# Patient Record
Sex: FEMALE | Race: WHITE | HISPANIC OR LATINO | Employment: FULL TIME | URBAN - METROPOLITAN AREA
[De-identification: names, ages, dates, MRNs, and addresses within clinical notes are randomized per-mention and may not be internally consistent; named-entity substitution may affect disease eponyms.]

---

## 2017-01-06 RX ORDER — SPIRONOLACTONE 25 MG/1
25 TABLET ORAL DAILY
Qty: 30 TAB | Refills: 3
Start: 2017-01-06 | End: 2017-10-17

## 2017-03-01 RX ORDER — COLESEVELAM HYDROCHLORIDE 625 MG/1
TABLET, FILM COATED ORAL
Refills: 3 | Status: CANCELLED | OUTPATIENT
Start: 2017-03-01

## 2017-03-02 DIAGNOSIS — E78.2 MIXED HYPERLIPIDEMIA: ICD-10-CM

## 2017-03-02 RX ORDER — COLESEVELAM 180 1/1
TABLET ORAL
Qty: 540 TAB | Refills: 3 | Status: SHIPPED | OUTPATIENT
Start: 2017-03-02 | End: 2018-04-19 | Stop reason: SDUPTHER

## 2017-03-02 NOTE — TELEPHONE ENCOUNTER
Was the patient seen in the last year in this department? Yes 12/19/16    Does patient have an active prescription for medications requested? No     Received Request Via: Pharmacy

## 2017-06-14 ENCOUNTER — OFFICE VISIT (OUTPATIENT)
Dept: MEDICAL GROUP | Facility: PHYSICIAN GROUP | Age: 47
End: 2017-06-14
Payer: COMMERCIAL

## 2017-06-14 VITALS
HEIGHT: 65 IN | OXYGEN SATURATION: 100 % | DIASTOLIC BLOOD PRESSURE: 68 MMHG | WEIGHT: 172 LBS | RESPIRATION RATE: 12 BRPM | BODY MASS INDEX: 28.66 KG/M2 | TEMPERATURE: 98.2 F | HEART RATE: 65 BPM | SYSTOLIC BLOOD PRESSURE: 100 MMHG

## 2017-06-14 DIAGNOSIS — Z13.0 SCREENING FOR ENDOCRINE, METABOLIC AND IMMUNITY DISORDER: ICD-10-CM

## 2017-06-14 DIAGNOSIS — F32.A ANXIETY AND DEPRESSION: ICD-10-CM

## 2017-06-14 DIAGNOSIS — Z13.29 SCREENING FOR ENDOCRINE, METABOLIC AND IMMUNITY DISORDER: ICD-10-CM

## 2017-06-14 DIAGNOSIS — E78.2 MIXED HYPERLIPIDEMIA: ICD-10-CM

## 2017-06-14 DIAGNOSIS — Z13.1 SCREENING FOR DIABETES MELLITUS (DM): ICD-10-CM

## 2017-06-14 DIAGNOSIS — Z13.228 SCREENING FOR ENDOCRINE, METABOLIC AND IMMUNITY DISORDER: ICD-10-CM

## 2017-06-14 DIAGNOSIS — M79.602 LEFT ARM PAIN: ICD-10-CM

## 2017-06-14 DIAGNOSIS — F41.9 ANXIETY AND DEPRESSION: ICD-10-CM

## 2017-06-14 PROCEDURE — 99214 OFFICE O/P EST MOD 30 MIN: CPT | Performed by: INTERNAL MEDICINE

## 2017-06-14 NOTE — MR AVS SNAPSHOT
"        Any Palmashawn   2017 2:40 PM   Office Visit   MRN: [id number]    Department:  [facility]   Dept Phone:  [phone]    Description:  Female : 1970   Provider:  Cali Farris M.D.           Allergies as of 2017     Allergen Noted Reactions    Minocycline 2014   Hives    Lip swelling      You were diagnosed with     Left arm pain   [600001]       Screening for endocrine, metabolic and immunity disorder   [9081027]       Screening for diabetes mellitus (DM)   [870647]       Anxiety and depression   [940648]       Mixed hyperlipidemia   [272.2.ICD-9-CM]         Vital Signs     Blood Pressure Pulse Temperature Respirations Height Weight    100/68 mmHg 65 36.8 °C (98.2 °F) 12 1.651 m (5' 5\") 78.019 kg (172 lb)    Body Mass Index Oxygen Saturation Smoking Status             28.62 kg/m2 100% Never Smoker          Basic Information     Date Of Birth Sex Race Ethnicity Preferred Language    1970 Female White Non- English      Your appointments     Oct 17, 2017  7:40 AM   Established Patient with Nikki Mclean D.O.   [facility]    [address] [place] [address] [id number] [phone]           You will be receiving a confirmation call a few days before your appointment from our automated call confirmation system.              Problem List              ICD-10-CM Priority Class Noted - Resolved    Anxiety and depression F41.9, F32.9   10/15/2015 - Present    Low serum vitamin D R79.89   10/20/2015 - Present    Weight gain R63.5   10/20/2015 - Present    Hormone replacement therapy (HRT) Z79.890   10/20/2015 - Present    Mixed hyperlipidemia E78.2   10/23/2015 - Present    Arthralgia M25.50   6/10/2016 - Present    Left arm pain M79.602   2017 - Present      Health Maintenance        Date Due Completion Dates    IMM DTaP/Tdap/Td Vaccine (1 - Tdap) 1987 ---    MAMMOGRAM 2017 (Originally 10/20/2016) 10/20/2015 (Postponed), 2012, " 8/17/2011, 8/11/2009, 8/11/2009    Override on 10/20/2015: Postponed    PAP SMEAR 10/20/2018 10/20/2015 (Postponed)    Override on 10/20/2015: Postponed            Current Immunizations     Influenza TIV (IM) 10/24/2013    Influenza Vaccine Quad Inj (Preserved) 10/20/2015 12:08 PM      Below and/or attached are the medications your provider expects you to take. Review all of your home medications and newly ordered medications with your provider and/or pharmacist. Follow medication instructions as directed by your provider and/or pharmacist. Please keep your medication list with you and share with your provider. Update the information when medications are discontinued, doses are changed, or new medications (including over-the-counter products) are added; and carry medication information at all times in the event of emergency situations     Allergies:  MINOCYCLINE - Hives               Medications  Valid as of: June 14, 2017 -  4:19 PM    Generic Name Brand Name Tablet Size Instructions for use    Citalopram Hydrobromide (Tab) CELEXA 20 MG TAKE ONE AND ONE-HALF (1 & 1/2) TABLET BY MOUTH DAILY        Colesevelam HCl (Pack) Colesevelam HCl 3.75 G Take  by mouth every morning.        Colesevelam HCl (Pack) Colesevelam HCl 3.75 G Take  by mouth.        Colesevelam HCl (Tab) WELCHOL 625 MG TAKE THREE TABLETS BY MOUTH TWICE A DAY WITH FOOD        Ergocalciferol (Cap) DRISDOL 01734 UNITS TAKE ONE CAPSULE BY MOUTH EVERY 7 DAYS        Estradiol (Tab) ESTRACE 2 MG Take 2 mg by mouth every day.        Estrogens Conjugated   Take  by mouth.        Spironolactone (Tab) ALDACTONE 25 MG Take 1 Tab by mouth every day.        .                 Medicines prescribed today were sent to:     [facility] #[id number] - [place], [place] - [facility] AT [address] [address]    Phone: [phone] Fax: [phone]    Open 24 Hours?: No      Medication refill instructions:       If your prescription bottle indicates you  have medication refills left, it is not necessary to call your provider’s office. Please contact your pharmacy and they will refill your medication.    If your prescription bottle indicates you do not have any refills left, you may request refills at any time through one of the following ways: The online [employer] system (except Urgent Care), by calling your provider’s office, or by asking your pharmacy to contact your provider’s office with a refill request. Medication refills are processed only during regular business hours and may not be available until the next business day. Your provider may request additional information or to have a follow-up visit with you prior to refilling your medication.   *Please Note: Medication refills are assigned a new Rx number when refilled electronically. Your pharmacy may indicate that no refills were authorized even though a new prescription for the same medication is available at the pharmacy. Please request the medicine by name with the pharmacy before contacting your provider for a refill.        Your To Do List     Future Labs/Procedures Complete By Expires    ANTI-NUCLEAR ANTIBODY SERUM  As directed 6/14/2018    CARDIAC STRESS TEST TREADMILL ONLY  As directed 6/14/2018    CBC WITH DIFFERENTIAL  As directed 6/14/2018    COMP METABOLIC PANEL  As directed 6/14/2018    DX-ELBOW-COMPLETE 3+ LEFT  As directed 6/14/2018    DX-SHOULDER 2+ LEFT  As directed 6/14/2018    LIPID PROFILE  As directed 6/14/2018    TSH WITH REFLEX TO FT4  As directed 6/14/2018    VITAMIN D,25 HYDROXY  As directed 6/14/2018    WESTERGREN SED RATE  As directed 6/14/2018         Comput[employer] Access Code: Activation code not generated  Current Compute[employer] Status: Active

## 2017-06-14 NOTE — ASSESSMENT & PLAN NOTE
Pt reports a pain in her left elbow that has been present for about 5-6 months. She feels that it is worse at the end of the day and when she lays on the area. It is not related to exertion. She denies trauma to the area. She has chronic joint pain but feels that this area has been getting worse. The pain is mostly located in the elbow but it can radiate to the shoulder and upper chest. She has not noticed any improvement with rest. She uses tylenol and aleve with some improvement in symptoms. Before 5-6 months ago she had some pain in the joints but not in the chest area.     She has no smoking history. She reports that her grandfather had an MI when he was in his 50's. She does get night sweats fairly often but attributes this to post menopausal symptoms from previous hysterectomy. She reports joint stiffness but it lasts for less than an hour.

## 2017-06-14 NOTE — ASSESSMENT & PLAN NOTE
Pt is on celexa 20 mg daily for this. She reports good compliance with medication. She feels that her mood is stable and her anxiety is fairly well controlled on the medication. She denies suicidal or homicidal ideation.    Received message requesting hospital follow-up appointment. Hospital follow-up noted on 4-20-17.

## 2017-06-14 NOTE — PROGRESS NOTES
Subjective:   Any Schwarz is a 46 y.o. female here today for left arm pain, anxiety/depression, hyperlipidemia    Left arm pain  Pt reports a pain in her left elbow that has been present for about 5-6 months. She feels that it is worse at the end of the day and when she lays on the area. It is not related to exertion. She denies trauma to the area. She has chronic joint pain but feels that this area has been getting worse. The pain is mostly located in the elbow but it can radiate to the shoulder and upper chest. She has not noticed any improvement with rest. She uses tylenol and aleve with some improvement in symptoms. Before 5-6 months ago she had some pain in the joints but not in the chest area.     She has no smoking history. She reports that her grandfather had an MI when he was in his 50's. She does get night sweats fairly often but attributes this to post menopausal symptoms from previous hysterectomy. She reports joint stiffness but it lasts for less than an hour.     Anxiety and depression  Pt is on celexa 20 mg daily for this. She reports good compliance with medication. She feels that her mood is stable and her anxiety is fairly well controlled on the medication. She denies suicidal or homicidal ideation.     Mixed hyperlipidemia  Pt is on colesevelam with good control of cholesterol. She did not tolerate statins.        Current medicines (including changes today)  Current Outpatient Prescriptions   Medication Sig Dispense Refill   • colesevelam (WELCHOL) 625 MG Tab TAKE THREE TABLETS BY MOUTH TWICE A DAY WITH FOOD 540 Tab 3   • spironolactone (ALDACTONE) 25 MG Tab Take 1 Tab by mouth every day. 30 Tab 3   • vitamin D, Ergocalciferol, (DRISDOL) 60348 UNITS Cap capsule TAKE ONE CAPSULE BY MOUTH EVERY 7 DAYS 4 Cap 11   • citalopram (CELEXA) 20 MG Tab TAKE ONE AND ONE-HALF (1 & 1/2) TABLET BY MOUTH DAILY 45 Tab 11   • estradiol (ESTRACE) 2 MG Tab Take 2 mg by mouth every day.     • Colesevelam HCl  "(WELCHOL) 3.75 G Pack Take  by mouth.     • ESTROGENS CONJUGATED PO Take  by mouth.     • Colesevelam HCl (WELCHOL) 3.75 G PACK Take  by mouth every morning.       No current facility-administered medications for this visit.     She  has a past medical history of Anesthesia; Anxiety; Depression; Arthritis; Unspecified urinary incontinence; Other specified symptom associated with female genital organs; Cancer (CMS-HCC) (1990); Heart burn; Other specified disorder of intestines; and High cholesterol. She also has no past medical history of Breast cancer (CMS-HCC).    ROS   No shortness of breath, no palpitations     Objective:     Blood pressure 100/68, pulse 65, temperature 36.8 °C (98.2 °F), resp. rate 12, height 1.651 m (5' 5\"), weight 78.019 kg (172 lb), SpO2 100 %. Body mass index is 28.62 kg/(m^2).   Physical Exam:  Constitutional: Alert & oriented, no acute distress  Eye: Conjunctiva clear, lids normal, no discharge  ENMT: Lips without lesions, normal external nose and ears  Respiratory: Unlabored respiratory effort, lungs clear to auscultation, no wheezes, no ronchi  Cardiovascular: Normal S1, S2, no murmur, no edema  MSK: Mild TTP of posterior left shoulder, limited ROM of L shoulder due to pain. Normal elbow ROM, normal strength and sensation in upper extremities, normal radial pulse  Skin: Warm, dry, good turgor, no rashes in visible areas  Neuro: No overt focal neurologic deficits, normal gait  Psych: Normal mood and affect      Assessment and Plan:   The following treatment plan was discussed    1. Left arm pain  Order further workup with x-ray of the shoulder and elbow, and lab tests including ESR and PAZ however given patient's family history of cardiac disease will also order stress test to ensure no underlying cardiac pathology that is causing this  - CARDIAC STRESS TEST TREADMILL ONLY; Future  - CBC WITH DIFFERENTIAL; Future  - WESTERGREN SED RATE; Future  - ANTI-NUCLEAR ANTIBODY SERUM; Future    2. " Screening for endocrine, metabolic and immunity disorder  - VITAMIN D,25 HYDROXY; Future  - TSH WITH REFLEX TO FT4; Future    3. Screening for diabetes mellitus (DM)  - COMP METABOLIC PANEL; Future    4. Anxiety and depression  Doing well on citalopram. Continue current medication    5. Mixed hyperlipidemia  Continue WelChol  - LIPID PROFILE; Future      Followup: Return in about 4 months (around 10/14/2017) for with PCP Dr. Mclean.    Please note that this dictation was created using voice recognition software. I have made every reasonable attempt to correct obvious errors, but I expect that there are errors of grammar and possibly content that I did not discover before finalizing the note.

## 2017-07-05 ENCOUNTER — HOSPITAL ENCOUNTER (OUTPATIENT)
Dept: RADIOLOGY | Facility: MEDICAL CENTER | Age: 47
End: 2017-07-05
Attending: INTERNAL MEDICINE
Payer: COMMERCIAL

## 2017-07-05 ENCOUNTER — NON-PROVIDER VISIT (OUTPATIENT)
Dept: CARDIOLOGY | Facility: MEDICAL CENTER | Age: 47
End: 2017-07-05
Attending: INTERNAL MEDICINE
Payer: COMMERCIAL

## 2017-07-05 DIAGNOSIS — M79.602 LEFT ARM PAIN: ICD-10-CM

## 2017-07-05 LAB — TREADMILL STRESS: NORMAL

## 2017-07-05 PROCEDURE — 73080 X-RAY EXAM OF ELBOW: CPT | Mod: LT

## 2017-07-05 PROCEDURE — 93015 CV STRESS TEST SUPVJ I&R: CPT | Performed by: INTERNAL MEDICINE

## 2017-07-05 PROCEDURE — 73030 X-RAY EXAM OF SHOULDER: CPT | Mod: LT

## 2017-07-07 NOTE — PROGRESS NOTES
Quick Note:    Dear Destini,    Can you please let Any Schwarz know that result is ok and I will see patient as scheduled?    Thanks Curt Georges.    ______

## 2017-07-17 DIAGNOSIS — F32.A DEPRESSIVE DISORDER: ICD-10-CM

## 2017-07-17 RX ORDER — CITALOPRAM 20 MG/1
TABLET ORAL
Qty: 135 TAB | Refills: 0 | Status: SHIPPED | OUTPATIENT
Start: 2017-07-17 | End: 2017-09-14 | Stop reason: SDUPTHER

## 2017-08-21 DIAGNOSIS — R79.89 LOW SERUM VITAMIN D: ICD-10-CM

## 2017-08-21 RX ORDER — ERGOCALCIFEROL 1.25 MG/1
50000 CAPSULE ORAL
Qty: 4 CAP | Refills: 6 | Status: SHIPPED | OUTPATIENT
Start: 2017-08-21 | End: 2018-04-21 | Stop reason: SDUPTHER

## 2017-08-21 NOTE — TELEPHONE ENCOUNTER
Requested Prescriptions     Signed Prescriptions Disp Refills   • vitamin D, Ergocalciferol, (DRISDOL) 81784 units Cap capsule 4 Cap 6     Sig: Take 1 Cap by mouth every 7 days.     Authorizing Provider: SALENA ATKINSON A.P.R.N.

## 2017-10-17 ENCOUNTER — OFFICE VISIT (OUTPATIENT)
Dept: MEDICAL GROUP | Facility: PHYSICIAN GROUP | Age: 47
End: 2017-10-17
Payer: COMMERCIAL

## 2017-10-17 ENCOUNTER — HOSPITAL ENCOUNTER (OUTPATIENT)
Dept: LAB | Facility: MEDICAL CENTER | Age: 47
End: 2017-10-17
Attending: INTERNAL MEDICINE
Payer: COMMERCIAL

## 2017-10-17 VITALS
TEMPERATURE: 97.4 F | HEART RATE: 87 BPM | OXYGEN SATURATION: 95 % | DIASTOLIC BLOOD PRESSURE: 76 MMHG | SYSTOLIC BLOOD PRESSURE: 112 MMHG | HEIGHT: 65 IN | RESPIRATION RATE: 14 BRPM | WEIGHT: 174 LBS | BODY MASS INDEX: 28.99 KG/M2

## 2017-10-17 DIAGNOSIS — F41.9 ANXIETY AND DEPRESSION: ICD-10-CM

## 2017-10-17 DIAGNOSIS — Z13.0 SCREENING FOR ENDOCRINE, METABOLIC AND IMMUNITY DISORDER: ICD-10-CM

## 2017-10-17 DIAGNOSIS — Z13.29 SCREENING FOR ENDOCRINE, METABOLIC AND IMMUNITY DISORDER: ICD-10-CM

## 2017-10-17 DIAGNOSIS — Z23 NEED FOR INFLUENZA VACCINATION: ICD-10-CM

## 2017-10-17 DIAGNOSIS — E78.2 MIXED HYPERLIPIDEMIA: ICD-10-CM

## 2017-10-17 DIAGNOSIS — Z13.228 SCREENING FOR ENDOCRINE, METABOLIC AND IMMUNITY DISORDER: ICD-10-CM

## 2017-10-17 DIAGNOSIS — M79.602 LEFT ARM PAIN: ICD-10-CM

## 2017-10-17 DIAGNOSIS — Z13.1 SCREENING FOR DIABETES MELLITUS (DM): ICD-10-CM

## 2017-10-17 DIAGNOSIS — F32.A ANXIETY AND DEPRESSION: ICD-10-CM

## 2017-10-17 DIAGNOSIS — Z79.890 HORMONE REPLACEMENT THERAPY (HRT): ICD-10-CM

## 2017-10-17 DIAGNOSIS — R79.89 LOW SERUM VITAMIN D: ICD-10-CM

## 2017-10-17 LAB
25(OH)D3 SERPL-MCNC: 32 NG/ML (ref 30–100)
ALBUMIN SERPL BCP-MCNC: 4.3 G/DL (ref 3.2–4.9)
ALBUMIN/GLOB SERPL: 1.5 G/DL
ALP SERPL-CCNC: 56 U/L (ref 30–99)
ALT SERPL-CCNC: 24 U/L (ref 2–50)
ANION GAP SERPL CALC-SCNC: 6 MMOL/L (ref 0–11.9)
AST SERPL-CCNC: 21 U/L (ref 12–45)
BASOPHILS # BLD AUTO: 1.7 % (ref 0–1.8)
BASOPHILS # BLD: 0.1 K/UL (ref 0–0.12)
BILIRUB SERPL-MCNC: 0.5 MG/DL (ref 0.1–1.5)
BUN SERPL-MCNC: 15 MG/DL (ref 8–22)
CALCIUM SERPL-MCNC: 10.1 MG/DL (ref 8.5–10.5)
CHLORIDE SERPL-SCNC: 105 MMOL/L (ref 96–112)
CHOLEST SERPL-MCNC: 217 MG/DL (ref 100–199)
CO2 SERPL-SCNC: 26 MMOL/L (ref 20–33)
CREAT SERPL-MCNC: 0.77 MG/DL (ref 0.5–1.4)
EOSINOPHIL # BLD AUTO: 0.2 K/UL (ref 0–0.51)
EOSINOPHIL NFR BLD: 3.5 % (ref 0–6.9)
ERYTHROCYTE [DISTWIDTH] IN BLOOD BY AUTOMATED COUNT: 44.9 FL (ref 35.9–50)
ERYTHROCYTE [SEDIMENTATION RATE] IN BLOOD BY WESTERGREN METHOD: 10 MM/HOUR (ref 0–20)
GFR SERPL CREATININE-BSD FRML MDRD: >60 ML/MIN/1.73 M 2
GLOBULIN SER CALC-MCNC: 2.8 G/DL (ref 1.9–3.5)
GLUCOSE SERPL-MCNC: 90 MG/DL (ref 65–99)
HCT VFR BLD AUTO: 42.1 % (ref 37–47)
HDLC SERPL-MCNC: 66 MG/DL
HGB BLD-MCNC: 13.8 G/DL (ref 12–16)
IMM GRANULOCYTES # BLD AUTO: 0.02 K/UL (ref 0–0.11)
IMM GRANULOCYTES NFR BLD AUTO: 0.3 % (ref 0–0.9)
LDLC SERPL CALC-MCNC: 132 MG/DL
LYMPHOCYTES # BLD AUTO: 2.1 K/UL (ref 1–4.8)
LYMPHOCYTES NFR BLD: 36.3 % (ref 22–41)
MCH RBC QN AUTO: 29.7 PG (ref 27–33)
MCHC RBC AUTO-ENTMCNC: 32.8 G/DL (ref 33.6–35)
MCV RBC AUTO: 90.5 FL (ref 81.4–97.8)
MONOCYTES # BLD AUTO: 0.33 K/UL (ref 0–0.85)
MONOCYTES NFR BLD AUTO: 5.7 % (ref 0–13.4)
NEUTROPHILS # BLD AUTO: 3.03 K/UL (ref 2–7.15)
NEUTROPHILS NFR BLD: 52.5 % (ref 44–72)
NRBC # BLD AUTO: 0 K/UL
NRBC BLD AUTO-RTO: 0 /100 WBC
PLATELET # BLD AUTO: 257 K/UL (ref 164–446)
PMV BLD AUTO: 11.6 FL (ref 9–12.9)
POTASSIUM SERPL-SCNC: 4.8 MMOL/L (ref 3.6–5.5)
PROT SERPL-MCNC: 7.1 G/DL (ref 6–8.2)
RBC # BLD AUTO: 4.65 M/UL (ref 4.2–5.4)
SODIUM SERPL-SCNC: 137 MMOL/L (ref 135–145)
TRIGL SERPL-MCNC: 93 MG/DL (ref 0–149)
TSH SERPL DL<=0.005 MIU/L-ACNC: 1.12 UIU/ML (ref 0.3–3.7)
WBC # BLD AUTO: 5.8 K/UL (ref 4.8–10.8)

## 2017-10-17 PROCEDURE — 80061 LIPID PANEL: CPT

## 2017-10-17 PROCEDURE — 80053 COMPREHEN METABOLIC PANEL: CPT

## 2017-10-17 PROCEDURE — 99214 OFFICE O/P EST MOD 30 MIN: CPT | Mod: 25 | Performed by: FAMILY MEDICINE

## 2017-10-17 PROCEDURE — 90686 IIV4 VACC NO PRSV 0.5 ML IM: CPT | Performed by: FAMILY MEDICINE

## 2017-10-17 PROCEDURE — 84443 ASSAY THYROID STIM HORMONE: CPT

## 2017-10-17 PROCEDURE — 36415 COLL VENOUS BLD VENIPUNCTURE: CPT

## 2017-10-17 PROCEDURE — 82306 VITAMIN D 25 HYDROXY: CPT

## 2017-10-17 PROCEDURE — 90471 IMMUNIZATION ADMIN: CPT | Performed by: FAMILY MEDICINE

## 2017-10-17 PROCEDURE — 86038 ANTINUCLEAR ANTIBODIES: CPT

## 2017-10-17 PROCEDURE — 85652 RBC SED RATE AUTOMATED: CPT

## 2017-10-17 PROCEDURE — 85025 COMPLETE CBC W/AUTO DIFF WBC: CPT

## 2017-10-17 NOTE — ASSESSMENT & PLAN NOTE
Ongoing issue. Patient reports she continues to take estradiol 2 mg daily. She has been followed by GYN

## 2017-10-17 NOTE — ASSESSMENT & PLAN NOTE
Ongoing issue. Patient reports compliance with WelChol. She denies any issues with abdominal pain, bloating, cramping. Patient had blood work done this morning. While it was unavailable during her visit it is now. Total cholesterol and LDL cholesterol now elevated above the recommended range.

## 2017-10-17 NOTE — PROGRESS NOTES
Subjective:   Any Schwarz is a 47 y.o. female here today forElevated cholesterol, low vitamin D, anxiety    Mixed hyperlipidemia  Ongoing issue. Patient reports compliance with WelChol. She denies any issues with abdominal pain, bloating, cramping. Patient had blood work done this morning. While it was unavailable during her visit it is now. Total cholesterol and LDL cholesterol now elevated above the recommended range.    Low serum vitamin D  Ongoing issue. Patient reports that she takes vitamin D 50,000 units every week.    Hormone replacement therapy (HRT)  Ongoing issue. Patient reports she continues to take estradiol 2 mg daily. She has been followed by GYN    Anxiety and depression  Ongoing issue. Patient reports that she takes Celexa 20 mg daily. She states that it does help stabilize her mood. She denies any suicidal or homicidal ideation.         Current medicines (including changes today)  Current Outpatient Prescriptions   Medication Sig Dispense Refill   • citalopram (CELEXA) 20 MG Tab TAKE ONE AND ONE-HALF (1 & 1/2) TABLET BY MOUTH DAILY 135 Tab 3   • vitamin D, Ergocalciferol, (DRISDOL) 97008 units Cap capsule Take 1 Cap by mouth every 7 days. 4 Cap 6   • colesevelam (WELCHOL) 625 MG Tab TAKE THREE TABLETS BY MOUTH TWICE A DAY WITH FOOD 540 Tab 3   • estradiol (ESTRACE) 2 MG Tab Take 2 mg by mouth every day.     • Colesevelam HCl (WELCHOL) 3.75 G Pack Take  by mouth.     • ESTROGENS CONJUGATED PO Take  by mouth.     • Colesevelam HCl (WELCHOL) 3.75 G PACK Take  by mouth every morning.       No current facility-administered medications for this visit.      She  has a past medical history of Anesthesia; Anxiety; Arthritis; Cancer (CMS-HCC) (1990); Depression; Heart burn; High cholesterol; Other specified disorder of intestines; Other specified symptom associated with female genital organs; and Unspecified urinary incontinence. She also has no past medical history of Breast cancer  "(CMS-[facility]).    ROS   No chest pain, no shortness of breath, no abdominal pain       Objective:     Blood pressure 112/76, pulse 87, temperature 36.3 °C (97.4 °F), resp. rate 14, height 1.651 m (5' 5\"), weight 78.9 kg (174 lb), SpO2 95 %. Body mass index is 28.96 kg/m².   Physical Exam:  Alert, oriented in no acute distress.  Eye contact is good, speech goal directed, affect calm  HEENT: conjunctiva non-injected, sclera non-icteric.  Pinna normal. TM pearly gray.   Oral mucous membranes pink and moist with no lesions.  Neck No adenopathy or masses in the neck or supraclavicular regions.  Lungs: clear to auscultation bilaterally with good excursion.  CV: regular rate and rhythm.  Abdomen: soft, nontender, No CVAT  Ext: no edema, color normal, vascularity normal, temperature normal  Neuro: Cranial nerves II through XII grossly intact      Assessment and Plan:   The following treatment plan was discussed     1. Mixed hyperlipidemia      Uncontrolled. Recommend continue current medications; recommend lifestyle changes. Monitor   2. Anxiety and depression      Stable. Continue Celexa 20 mg daily. Monitor   3. Low serum vitamin D      Uncontrolled. Continue current medications; monitor   4. Hormone replacement therapy (HRT)      Stable. Continue follow GYN. Monitor   5. Need for influenza vaccination  Flu Quad Inj >3 Year Pre-Filled PF    Age appropriate immunization provided; patient tolerated procedure well.       Followup: Return in about 6 months (around 4/17/2018) for medication review, Short.            "

## 2017-10-19 LAB — NUCLEAR IGG SER QL IA: NORMAL

## 2017-10-23 ENCOUNTER — TELEPHONE (OUTPATIENT)
Dept: MEDICAL GROUP | Facility: PHYSICIAN GROUP | Age: 47
End: 2017-10-23

## 2017-10-23 NOTE — LETTER
October 23, 2017         Any Schwarz  [address] [place] [address]        Dear Any:      Below are the results from your recent visit:    Labs reviewed and I have no acute concerns. Pt can follow up with PCP. Thank you   - Dr. Kaleigh Harley Orders   CBC WITH DIFFERENTIAL   Result Value Ref Range    WBC 5.8 4.8 - 10.8 K/uL    RBC 4.65 4.20 - 5.40 M/uL    Hemoglobin 13.8 12.0 - 16.0 g/dL    Hematocrit 42.1 37.0 - 47.0 %    MCV 90.5 81.4 - 97.8 fL    MCH 29.7 27.0 - 33.0 pg    MCHC 32.8 (L) 33.6 - 35.0 g/dL    RDW 44.9 35.9 - 50.0 fL    Platelet Count 257 164 - 446 K/uL    MPV 11.6 9.0 - 12.9 fL    Neutrophils-Polys 52.50 44.00 - 72.00 %    Lymphocytes 36.30 22.00 - 41.00 %    Monocytes 5.70 0.00 - 13.40 %    Eosinophils 3.50 0.00 - 6.90 %    Basophils 1.70 0.00 - 1.80 %    Immature Granulocytes 0.30 0.00 - 0.90 %    Nucleated RBC 0.00 /100 WBC    Neutrophils (Absolute) 3.03 2.00 - 7.15 K/uL      Comment:      Includes immature neutrophils, if present.    Lymphs (Absolute) 2.10 1.00 - 4.80 K/uL    Monos (Absolute) 0.33 0.00 - 0.85 K/uL    Eos (Absolute) 0.20 0.00 - 0.51 K/uL    Baso (Absolute) 0.10 0.00 - 0.12 K/uL    Immature Granulocytes (abs) 0.02 0.00 - 0.11 K/uL    NRBC (Absolute) 0.00 K/uL    Narrative    Request patient fasting?->Yes   COMP METABOLIC PANEL   Result Value Ref Range    Sodium 137 135 - 145 mmol/L    Potassium 4.8 3.6 - 5.5 mmol/L    Chloride 105 96 - 112 mmol/L    Co2 26 20 - 33 mmol/L    Anion Gap 6.0 0.0 - 11.9    Glucose 90 65 - 99 mg/dL    Bun 15 8 - 22 mg/dL    Creatinine 0.77 0.50 - 1.40 mg/dL    Calcium 10.1 8.5 - 10.5 mg/dL    AST(SGOT) 21 12 - 45 U/L    ALT(SGPT) 24 2 - 50 U/L    Alkaline Phosphatase 56 30 - 99 U/L    Total Bilirubin 0.5 0.1 - 1.5 mg/dL    Albumin 4.3 3.2 - 4.9 g/dL    Total Protein 7.1 6.0 - 8.2 g/dL    Globulin 2.8 1.9 - 3.5 g/dL    A-G Ratio 1.5 g/dL    Narrative    Request patient fasting?->Yes   LIPID PROFILE   Result Value Ref Range    Cholesterol,Tot 217 (H) 100 - 199 mg/dL    Triglycerides 93 0 - 149 mg/dL    HDL 66 >=40 mg/dL     (H) <100 mg/dL    Narrative    Request patient fasting?->Yes   WESTERGREN SED RATE   Result Value Ref Range    Sed Rate Westergren 10 0 - 20 mm/hour    Narrative    Request patient fasting?->Yes   ANTI-NUCLEAR ANTIBODY SERUM   Result Value Ref Range    Antinuclear Antibody None Detected None Detected      Comment:      No antibodies to Anti-Nuclear Antibodies (PAZ) detected. No  further testing will be performed.  If suspicion of connective tissue disease is strong and PAZ EIA is  negative, consider testing for PAZ by IFA ([id number]).  INTERPRETIVE INFORMATION: Anti-Nuclear Antibodies (PAZ), IgG by  REEMA  PAZ specimens are screened using enzyme-linked immunosorbent assay  (REEMA) methodology. All REEMA results reported as Detected are  further tested by indirect fluorescent assay (IFA) using HEp-2  substrate with an IgG-specific conjugate. The PAZ REEMA screen is  designed to detect antibodies against dsDNA, histone, SS-A (Ro),  SS-B (La), Bonilla, snRNP/Sm, Scl-70, Kaleigh-1, centromere, and an  extract of lysed HEp-2 cells. PAZ REEMA assays have been reported  to have lower sensitivities for antibodies associated with  nucleolar and speckled PAZ-IFA patterns.  Performed by [employer],  [address] [place] [id number] [phone]  [email or web address], Jl Marin MD - Lab. Director      Narrative    Request patient fasting?->Yes   VITAMIN D,25 HYDROXY   Result Value Ref Range    25-Hydroxy   Vitamin D 25 32 30 - 100 ng/mL      Comment:      Adult Ranges:   <20 ng/mL - Deficiency  20-29 ng/mL - Insufficiency   ng/mL - Sufficiency  The [employer] Vitamin D Assay is standardized to the  [employer] reference measurement procedures, a  reference method for the Vitamin D Standardization Program  (VDSP).  The VDSP aligns patient results among 25 (OH)  Vitamin D methods.      Narrative    Request patient  fasting?->Yes   TSH WITH REFLEX TO FT4   Result Value Ref Range    TSH 1.120 0.300 - 3.700 uIU/mL    Narrative    Request patient fasting?->Yes   ESTIMATED GFR   Result Value Ref Range    GFR If African American >60 >60 mL/min/1.73 m 2    GFR If Non African American >60 >60 mL/min/1.73 m 2    Narrative    Request patient fasting?->Yes     If you have any questions or concerns, please don't hesitate to call.    Electronically Signed

## 2017-10-24 NOTE — TELEPHONE ENCOUNTER
----- Message from Cali Farris M.D. sent at 10/23/2017 10:57 AM PDT -----  Labs reviewed and I have no acute concerns. Pt can follow up with PCP. Please call patient and inform them. Thank you  - Dr. Farris

## 2018-04-19 DIAGNOSIS — E78.2 MIXED HYPERLIPIDEMIA: ICD-10-CM

## 2018-04-19 RX ORDER — COLESEVELAM 180 1/1
TABLET ORAL
Qty: 540 TAB | Refills: 2 | Status: SHIPPED | OUTPATIENT
Start: 2018-04-19 | End: 2019-05-14 | Stop reason: SDUPTHER

## 2018-04-19 NOTE — TELEPHONE ENCOUNTER
Was the patient seen in the last year in this department? Yes LOV 10/17/17     Does patient have an active prescription for medications requested? No     Received Request Via: Pharmacy

## 2018-04-21 DIAGNOSIS — R79.89 LOW SERUM VITAMIN D: ICD-10-CM

## 2018-04-23 RX ORDER — ERGOCALCIFEROL 1.25 MG/1
CAPSULE ORAL
Qty: 12 CAP | Refills: 1 | Status: SHIPPED | OUTPATIENT
Start: 2018-04-23 | End: 2019-04-10

## 2018-06-15 ENCOUNTER — RX ONLY (OUTPATIENT)
Age: 48
Setting detail: RX ONLY
End: 2018-06-15

## 2018-06-15 RX ORDER — VALACYCLOVIR HYDROCHLORIDE 1 G/1
1 GM TABLET, FILM COATED ORAL
Qty: 16 | Refills: 3 | Status: ERX | COMMUNITY
Start: 2018-06-15

## 2018-09-21 ENCOUNTER — OFFICE VISIT (OUTPATIENT)
Dept: MEDICAL GROUP | Facility: PHYSICIAN GROUP | Age: 48
End: 2018-09-21
Payer: COMMERCIAL

## 2018-09-21 VITALS
BODY MASS INDEX: 28.66 KG/M2 | WEIGHT: 172 LBS | HEART RATE: 72 BPM | OXYGEN SATURATION: 98 % | DIASTOLIC BLOOD PRESSURE: 64 MMHG | HEIGHT: 65 IN | SYSTOLIC BLOOD PRESSURE: 104 MMHG | TEMPERATURE: 97.3 F

## 2018-09-21 DIAGNOSIS — Z23 NEED FOR VACCINATION: ICD-10-CM

## 2018-09-21 DIAGNOSIS — Z00.00 WELL ADULT HEALTH CHECK: ICD-10-CM

## 2018-09-21 PROCEDURE — 90471 IMMUNIZATION ADMIN: CPT | Performed by: FAMILY MEDICINE

## 2018-09-21 PROCEDURE — 99396 PREV VISIT EST AGE 40-64: CPT | Mod: 25 | Performed by: FAMILY MEDICINE

## 2018-09-21 PROCEDURE — 90686 IIV4 VACC NO PRSV 0.5 ML IM: CPT | Performed by: FAMILY MEDICINE

## 2018-09-21 RX ORDER — VALACYCLOVIR HYDROCHLORIDE 500 MG/1
500 TABLET, FILM COATED ORAL 2 TIMES DAILY
COMMUNITY
End: 2022-05-11

## 2018-09-21 NOTE — ASSESSMENT & PLAN NOTE
Patient is here today for annual physical; no acute findings on exam.  Chart is been reviewed and updated.  Patient is only reporting some mild arthralgias that are the same as they have been in the past.  Patient has not had any surgery since her last appointment.

## 2018-10-25 ENCOUNTER — HOSPITAL ENCOUNTER (OUTPATIENT)
Dept: LAB | Facility: MEDICAL CENTER | Age: 48
End: 2018-10-25
Attending: FAMILY MEDICINE
Payer: COMMERCIAL

## 2018-10-25 DIAGNOSIS — Z00.00 WELL ADULT HEALTH CHECK: ICD-10-CM

## 2018-10-25 LAB
25(OH)D3 SERPL-MCNC: 51 NG/ML (ref 30–100)
ALBUMIN SERPL BCP-MCNC: 4.3 G/DL (ref 3.2–4.9)
ALBUMIN/GLOB SERPL: 1.5 G/DL
ALP SERPL-CCNC: 53 U/L (ref 30–99)
ALT SERPL-CCNC: 36 U/L (ref 2–50)
ANION GAP SERPL CALC-SCNC: 5 MMOL/L (ref 0–11.9)
AST SERPL-CCNC: 24 U/L (ref 12–45)
BILIRUB SERPL-MCNC: 0.4 MG/DL (ref 0.1–1.5)
BUN SERPL-MCNC: 17 MG/DL (ref 8–22)
CALCIUM SERPL-MCNC: 9.8 MG/DL (ref 8.5–10.5)
CHLORIDE SERPL-SCNC: 105 MMOL/L (ref 96–112)
CHOLEST SERPL-MCNC: 179 MG/DL (ref 100–199)
CO2 SERPL-SCNC: 27 MMOL/L (ref 20–33)
CREAT SERPL-MCNC: 0.71 MG/DL (ref 0.5–1.4)
FASTING STATUS PATIENT QL REPORTED: NORMAL
GLOBULIN SER CALC-MCNC: 2.8 G/DL (ref 1.9–3.5)
GLUCOSE SERPL-MCNC: 83 MG/DL (ref 65–99)
HDLC SERPL-MCNC: 49 MG/DL
LDLC SERPL CALC-MCNC: 103 MG/DL
POTASSIUM SERPL-SCNC: 4.6 MMOL/L (ref 3.6–5.5)
PROT SERPL-MCNC: 7.1 G/DL (ref 6–8.2)
SODIUM SERPL-SCNC: 137 MMOL/L (ref 135–145)
TRIGL SERPL-MCNC: 135 MG/DL (ref 0–149)
TSH SERPL DL<=0.005 MIU/L-ACNC: 0.77 UIU/ML (ref 0.38–5.33)

## 2018-10-25 PROCEDURE — 80053 COMPREHEN METABOLIC PANEL: CPT

## 2018-10-25 PROCEDURE — 80061 LIPID PANEL: CPT

## 2018-10-25 PROCEDURE — 84443 ASSAY THYROID STIM HORMONE: CPT

## 2018-10-25 PROCEDURE — 82306 VITAMIN D 25 HYDROXY: CPT

## 2018-10-25 PROCEDURE — 36415 COLL VENOUS BLD VENIPUNCTURE: CPT

## 2018-11-07 ENCOUNTER — APPOINTMENT (RX ONLY)
Dept: URBAN - METROPOLITAN AREA CLINIC 4 | Facility: CLINIC | Age: 48
Setting detail: DERMATOLOGY
End: 2018-11-07

## 2018-11-07 DIAGNOSIS — D18.0 HEMANGIOMA: ICD-10-CM

## 2018-11-07 DIAGNOSIS — L81.4 OTHER MELANIN HYPERPIGMENTATION: ICD-10-CM

## 2018-11-07 DIAGNOSIS — Z85.820 PERSONAL HISTORY OF MALIGNANT MELANOMA OF SKIN: ICD-10-CM

## 2018-11-07 DIAGNOSIS — L82.1 OTHER SEBORRHEIC KERATOSIS: ICD-10-CM

## 2018-11-07 DIAGNOSIS — I83.9 ASYMPTOMATIC VARICOSE VEINS OF LOWER EXTREMITIES: ICD-10-CM

## 2018-11-07 DIAGNOSIS — D22 MELANOCYTIC NEVI: ICD-10-CM

## 2018-11-07 PROBLEM — D22.62 MELANOCYTIC NEVI OF LEFT UPPER LIMB, INCLUDING SHOULDER: Status: ACTIVE | Noted: 2018-11-07

## 2018-11-07 PROBLEM — I83.90 ASYMPTOMATIC VARICOSE VEINS OF UNSPECIFIED LOWER EXTREMITY: Status: ACTIVE | Noted: 2018-11-07

## 2018-11-07 PROBLEM — D22.5 MELANOCYTIC NEVI OF TRUNK: Status: ACTIVE | Noted: 2018-11-07

## 2018-11-07 PROBLEM — D18.01 HEMANGIOMA OF SKIN AND SUBCUTANEOUS TISSUE: Status: ACTIVE | Noted: 2018-11-07

## 2018-11-07 PROBLEM — D22.72 MELANOCYTIC NEVI OF LEFT LOWER LIMB, INCLUDING HIP: Status: ACTIVE | Noted: 2018-11-07

## 2018-11-07 PROBLEM — D22.61 MELANOCYTIC NEVI OF RIGHT UPPER LIMB, INCLUDING SHOULDER: Status: ACTIVE | Noted: 2018-11-07

## 2018-11-07 PROBLEM — D22.71 MELANOCYTIC NEVI OF RIGHT LOWER LIMB, INCLUDING HIP: Status: ACTIVE | Noted: 2018-11-07

## 2018-11-07 PROCEDURE — 99213 OFFICE O/P EST LOW 20 MIN: CPT

## 2018-11-07 PROCEDURE — ? COUNSELING

## 2018-11-07 PROCEDURE — ? SUNSCREEN RECOMMENDATIONS

## 2018-11-07 ASSESSMENT — LOCATION DETAILED DESCRIPTION DERM
LOCATION DETAILED: RIGHT PROXIMAL DORSAL FOREARM
LOCATION DETAILED: LEFT ULNAR DORSAL HAND
LOCATION DETAILED: LEFT INFERIOR ANTERIOR NECK
LOCATION DETAILED: RIGHT SUPERIOR MEDIAL MIDBACK
LOCATION DETAILED: LEFT PROXIMAL POSTERIOR UPPER ARM
LOCATION DETAILED: LEFT CENTRAL MALAR CHEEK
LOCATION DETAILED: PERIUMBILICAL SKIN
LOCATION DETAILED: LEFT ANTERIOR PROXIMAL THIGH
LOCATION DETAILED: RIGHT CENTRAL MALAR CHEEK
LOCATION DETAILED: RIGHT RADIAL DORSAL HAND
LOCATION DETAILED: RIGHT DISTAL POSTERIOR UPPER ARM
LOCATION DETAILED: LEFT PROXIMAL DORSAL FOREARM
LOCATION DETAILED: SUPERIOR THORACIC SPINE
LOCATION DETAILED: RIGHT ANTERIOR PROXIMAL THIGH
LOCATION DETAILED: LEFT SUPERIOR MEDIAL UPPER BACK
LOCATION DETAILED: RIGHT RIB CAGE

## 2018-11-07 ASSESSMENT — LOCATION SIMPLE DESCRIPTION DERM
LOCATION SIMPLE: LEFT POSTERIOR UPPER ARM
LOCATION SIMPLE: RIGHT CHEEK
LOCATION SIMPLE: LEFT ANTERIOR NECK
LOCATION SIMPLE: ABDOMEN
LOCATION SIMPLE: LEFT CHEEK
LOCATION SIMPLE: LEFT HAND
LOCATION SIMPLE: RIGHT POSTERIOR UPPER ARM
LOCATION SIMPLE: RIGHT THIGH
LOCATION SIMPLE: LEFT THIGH
LOCATION SIMPLE: LEFT FOREARM
LOCATION SIMPLE: RIGHT HAND
LOCATION SIMPLE: RIGHT LOWER BACK
LOCATION SIMPLE: RIGHT FOREARM
LOCATION SIMPLE: LEFT UPPER BACK
LOCATION SIMPLE: UPPER BACK

## 2018-11-07 ASSESSMENT — LOCATION ZONE DERM
LOCATION ZONE: FACE
LOCATION ZONE: TRUNK
LOCATION ZONE: NECK
LOCATION ZONE: HAND
LOCATION ZONE: LEG
LOCATION ZONE: ARM

## 2018-12-17 ENCOUNTER — OFFICE VISIT (OUTPATIENT)
Dept: MEDICAL GROUP | Facility: PHYSICIAN GROUP | Age: 48
End: 2018-12-17
Payer: COMMERCIAL

## 2018-12-17 VITALS
RESPIRATION RATE: 14 BRPM | SYSTOLIC BLOOD PRESSURE: 98 MMHG | WEIGHT: 186 LBS | BODY MASS INDEX: 30.99 KG/M2 | OXYGEN SATURATION: 96 % | HEART RATE: 60 BPM | TEMPERATURE: 98.3 F | HEIGHT: 65 IN | DIASTOLIC BLOOD PRESSURE: 64 MMHG

## 2018-12-17 DIAGNOSIS — R79.89 LOW SERUM VITAMIN D: ICD-10-CM

## 2018-12-17 DIAGNOSIS — F32.A ANXIETY AND DEPRESSION: ICD-10-CM

## 2018-12-17 DIAGNOSIS — F32.A DEPRESSIVE DISORDER: ICD-10-CM

## 2018-12-17 DIAGNOSIS — E78.2 MIXED HYPERLIPIDEMIA: ICD-10-CM

## 2018-12-17 DIAGNOSIS — Z12.11 SCREEN FOR COLON CANCER: ICD-10-CM

## 2018-12-17 DIAGNOSIS — F41.9 ANXIETY AND DEPRESSION: ICD-10-CM

## 2018-12-17 PROCEDURE — 99214 OFFICE O/P EST MOD 30 MIN: CPT | Performed by: FAMILY MEDICINE

## 2018-12-17 RX ORDER — CITALOPRAM 40 MG/1
40 TABLET ORAL DAILY
Qty: 90 TAB | Refills: 3 | Status: SHIPPED | OUTPATIENT
Start: 2018-12-17 | End: 2019-08-19

## 2018-12-17 NOTE — ASSESSMENT & PLAN NOTE
This is a chronic medical problem which is resolved.  She was previously taking vitamin D 50,000 units weekly.  Her most recent vitamin D level from October 2018 is 51.  We did discuss that she will start taking over-the-counter 2000 units daily.

## 2018-12-17 NOTE — PROGRESS NOTES
CC:  Diagnoses of Anxiety and depression, Screen for colon cancer, Low serum vitamin D, and Mixed hyperlipidemia were pertinent to this visit.    HISTORY OF THE PRESENT ILLNESS: Patient is a 48 y.o. female. This pleasant patient is here today to establish care.  She is feeling a little more depressed and anxious and is requesting her Celexa dose be increased.    Health Maintenance: Referral for colonoscopy will be given today.  Her GYN, , given her a referral for a mammogram.      Anxiety and depression  This is a chronic medical problem.  She has been taking celexa 20mg for the last 6 years now.  Prior to that she was on Paxil.  She is requesting that we increase the dose of celexa as she has had a lot going on this year.  A family member is currently battling brain cancer and yesterday was the one year anniversary of her nephew's death.  She is tearful and denies any suicidal ideations.  Has never had any therapy and is currently not interested in a referral.    Low serum vitamin D  This is a chronic medical problem which is resolved.  She was previously taking vitamin D 50,000 units weekly.  Her most recent vitamin D level from October 2018 is 51.  We did discuss that she will start taking over-the-counter 2000 units daily.    Mixed hyperlipidemia  This is a chronic medical problem for which she is prescribed WelChol 625 mg twice a day.  However she is usually taking it only once a day as she usually forgets to take the second dose.  Her most recent fasting lipid profile From October 2018 his total cholesterol 179, triglycerides 135, HDL 49, and .      Allergies: Minocycline    Current Outpatient Prescriptions Ordered in [facility]   Medication Sig Dispense Refill   • valACYclovir (VALTREX) 500 MG Tab Take 500 mg by mouth 2 times a day.     • vitamin D, Ergocalciferol, (DRISDOL) 28465 units Cap capsule TAKE ONE CAPSULE BY MOUTH ONCE EVERY 7 DAYS 12 Cap 1   • colesevelam (WELCHOL) 625 MG Tab TAKE  THREE TABLETS BY MOUTH TWICE A DAY WITH FOOD 540 Tab 2   • citalopram (CELEXA) 20 MG Tab TAKE ONE AND ONE-HALF (1 & 1/2) TABLET BY MOUTH DAILY 135 Tab 3   • estradiol (ESTRACE) 2 MG Tab Take 2 mg by mouth every day.     • ESTROGENS CONJUGATED PO Take  by mouth.       No current Epic-ordered facility-administered medications on file.        Past Medical History:   Diagnosis Date   • Anesthesia     PONV   • Anxiety    • Arthritis     elbows and hands   • Cancer (HCC) 1990    melanoma left leg   • Depression    • Heart burn    • High cholesterol    • Other specified disorder of intestines     IBS   • Other specified symptom associated with female genital organs     heavy bleeding   • Unspecified urinary incontinence        Past Surgical History:   Procedure Laterality Date   • ABDOMINAL HYSTERECTOMY TOTAL  6/24/2014    Performed by Hernandez Kilpatrick M.D. at SURGERY SAME DAY [facility] ORS   • HYSTEROSCOPY WITH VIDEO OPERATIVE  11/25/2013    Performed by Hernandez Kilpatrick M.D. at [facility]       Social History   Substance Use Topics   • Smoking status: Never Smoker   • Smokeless tobacco: Never Used   • Alcohol use 0.0 oz/week      Comment: once a week       Social History     Social History Narrative   • No narrative on file       Family History   Problem Relation Age of Onset   • Hypertension Mother    • Cancer Father         prostate   • No Known Problems Sister    • No Known Problems Brother    • Diabetes Maternal Aunt    • Diabetes Paternal Uncle    • Diabetes Maternal Grandmother    • Diabetes Paternal Grandfather        ROS:     - Constitutional: Negative for fever, chills, and fatigue   - Eyes:  Negative blurry vision or eye pain    - ENT: Negative for ear pain, rhinorrhea, sinus congestion, sore throat    - Respiratory: Negative for cough or shortness of breath    - Cardiovascular: Negative for chest pain, palpitations    - Gastrointestinal: Negative for heartburn, nausea, vomiting, abdominal  "pain,     - Genitourinary: Negative for dysuria    - Musculoskeletal: Negative for myalgias    - Skin: Negative for rash, itching    - Neurological: Negative for headaches, dizziness    - Endo:  Negative for increased thirst or polyuria    - Heme/Lymph: Does not bruise/bleed easily.     - Psychiatric/Behavioral: feeling down.      Exam: Blood pressure (!) 98/64, pulse 60, temperature 36.8 °C (98.3 °F), temperature source Temporal, resp. rate 14, height 1.651 m (5' 5\"), weight 84.4 kg (186 lb), last menstrual period 05/01/2014, SpO2 96 %. Body mass index is 30.95 kg/m².    General:  Normal appearing. No distress.  Eyes:  Eyes conjunctiva clear lids without ptosis, pupils equal and reactive to light accommodation,   ENMT:   ears normal shape and contour, canals are clear bilaterally, tympanic membranes are benign, nasal mucosa benign, oropharynx is without erythema, edema or exudates.   Neck:  Supple without JVD or bruit. Thyroid is not enlarged.  Pulmonary:  Clear to ausculation.  Normal effort. No rales, ronchi, or wheezing.  Cardiovascular:  Regular rate and rhythm without murmur.  Abdomen:  Soft, nontender, nondistended. Normal bowel sounds.  Neurologic:  No tremors  Lymph:  No cervical, supraclavicular  lymph nodes are palpable  Skin:  Warm and dry.  No obvious lesions.  Musculoskeletal:  Normal gait. No extremity cyanosis, clubbing, or edema.  Psych: tearful  Alert and oriented x3. Judgment and insight is normal.    Please note that this dictation was created using voice recognition software. I have made every reasonable attempt to correct obvious errors, but I expect that there are errors of grammar and possibly content that I did not discover before finalizing the note.      Assessment/Plan  1. Anxiety and depression  This is a chronic medical problem which is currently not well controlled.  Go ahead and increase her Celexa to 40 mg daily.  We did discuss referral to psychology and she does not want to pursue " it.  She will follow-up with me in 3 months or sooner if necessary.    2. Screen for colon cancer  Referral to GI for colonoscopy has been ordered.  - REFERRAL TO GI FOR COLONOSCOPY    3. Low serum vitamin D  Is a which is continuing to improve which is resolved now.  She will stop taking her vitamin D 50,000 and start with over-the-counter.    4. Mixed hyperlipidemia  This is a chronic medical problem which is continuing to improve.  She will continue with WelChol 625 mg daily.      No Follow-up on file.

## 2018-12-17 NOTE — ASSESSMENT & PLAN NOTE
This is a chronic medical problem.  She has been taking celexa 20mg for the last 6 years now.  Prior to that she was on Paxil.  She is requesting that we increase the dose of celexa as she has had a lot going on this year.  A family member is currently battling brain cancer and yesterday was the one year anniversary of her nephew's death.  She is tearful and denies any suicidal ideations.  Has never had any therapy and is currently not interested in a referral.

## 2018-12-17 NOTE — ASSESSMENT & PLAN NOTE
This is a chronic medical problem for which she is prescribed WelChol 625 mg twice a day.  However she is usually taking it only once a day as she usually forgets to take the second dose.  Her most recent fasting lipid profile From October 2018 his total cholesterol 179, triglycerides 135, HDL 49, and .

## 2019-01-09 ENCOUNTER — HOSPITAL ENCOUNTER (OUTPATIENT)
Dept: RADIOLOGY | Facility: MEDICAL CENTER | Age: 49
End: 2019-01-09
Attending: FAMILY MEDICINE
Payer: COMMERCIAL

## 2019-01-09 DIAGNOSIS — Z12.39 SCREENING FOR BREAST CANCER: ICD-10-CM

## 2019-01-09 PROCEDURE — 77063 BREAST TOMOSYNTHESIS BI: CPT

## 2019-01-15 ENCOUNTER — HOSPITAL ENCOUNTER (OUTPATIENT)
Dept: RADIOLOGY | Facility: MEDICAL CENTER | Age: 49
End: 2019-01-15
Attending: FAMILY MEDICINE
Payer: COMMERCIAL

## 2019-01-15 DIAGNOSIS — R92.8 ABNORMAL MAMMOGRAM: ICD-10-CM

## 2019-01-15 PROCEDURE — G0279 TOMOSYNTHESIS, MAMMO: HCPCS | Mod: RT

## 2019-01-15 PROCEDURE — 76642 ULTRASOUND BREAST LIMITED: CPT | Mod: RT

## 2019-02-02 ENCOUNTER — OFFICE VISIT (OUTPATIENT)
Dept: URGENT CARE | Facility: PHYSICIAN GROUP | Age: 49
End: 2019-02-02
Payer: COMMERCIAL

## 2019-02-02 VITALS
HEART RATE: 113 BPM | DIASTOLIC BLOOD PRESSURE: 80 MMHG | OXYGEN SATURATION: 93 % | SYSTOLIC BLOOD PRESSURE: 120 MMHG | BODY MASS INDEX: 29.16 KG/M2 | RESPIRATION RATE: 14 BRPM | WEIGHT: 175 LBS | HEIGHT: 65 IN | TEMPERATURE: 101.2 F

## 2019-02-02 DIAGNOSIS — R05.9 COUGH: ICD-10-CM

## 2019-02-02 DIAGNOSIS — J10.1 INFLUENZA A: Primary | ICD-10-CM

## 2019-02-02 LAB
FLUAV+FLUBV AG SPEC QL IA: POSITIVE
INT CON NEG: NEGATIVE
INT CON POS: POSITIVE

## 2019-02-02 PROCEDURE — 87804 INFLUENZA ASSAY W/OPTIC: CPT | Performed by: NURSE PRACTITIONER

## 2019-02-02 PROCEDURE — 99214 OFFICE O/P EST MOD 30 MIN: CPT | Performed by: NURSE PRACTITIONER

## 2019-02-02 RX ORDER — BENZONATATE 200 MG/1
200 CAPSULE ORAL EVERY 8 HOURS PRN
Qty: 21 CAP | Refills: 0 | Status: SHIPPED | OUTPATIENT
Start: 2019-02-02 | End: 2019-04-10

## 2019-02-02 RX ORDER — OSELTAMIVIR PHOSPHATE 75 MG/1
75 CAPSULE ORAL 2 TIMES DAILY
Qty: 10 CAP | Refills: 0 | Status: SHIPPED | OUTPATIENT
Start: 2019-02-02 | End: 2019-02-07

## 2019-02-02 ASSESSMENT — ENCOUNTER SYMPTOMS
FOCAL WEAKNESS: 0
WEAKNESS: 0
HEADACHES: 1
CARDIOVASCULAR NEGATIVE: 1
TINGLING: 0
FEVER: 1
EYES NEGATIVE: 1
MUSCULOSKELETAL NEGATIVE: 1
COUGH: 1
DIZZINESS: 0
SENSORY CHANGE: 0
PSYCHIATRIC NEGATIVE: 1
GASTROINTESTINAL NEGATIVE: 1
SHORTNESS OF BREATH: 0
SORE THROAT: 1

## 2019-02-02 NOTE — PROGRESS NOTES
Subjective:     Any Schwarz is a 48 y.o. female who presents for Cough (for month started with fever yesterday )       Cough   This is a new problem. Episode onset: about a month ago, but started worsening yesterday with fever. The problem has been gradually worsening. The cough is non-productive. Associated symptoms include a fever, headaches, nasal congestion and a sore throat. Pertinent negatives include no chest pain or shortness of breath. She has tried OTC cough suppressant for the symptoms. The treatment provided no relief.   Hasn't had her flu shot this season.    PMH:  has a past medical history of Anesthesia; Anxiety; Arthritis; Cancer ([facility]) (1990); Depression; Heart burn; High cholesterol; Other specified disorder of intestines; Other specified symptom associated with female genital organs; and Unspecified urinary incontinence. She also has no past medical history of Breast cancer ([facility]).    MEDS:   Current Outpatient Prescriptions:   •  oseltamivir (TAMIFLU) 75 MG Cap, Take 1 Cap by mouth 2 times a day for 5 days., Disp: 10 Cap, Rfl: 0  •  benzonatate (TESSALON) 200 MG capsule, Take 1 Cap by mouth every 8 hours as needed for Cough., Disp: 21 Cap, Rfl: 0  •  citalopram (CELEXA) 40 MG Tab, Take 1 Tab by mouth every day., Disp: 90 Tab, Rfl: 3  •  valACYclovir (VALTREX) 500 MG Tab, Take 500 mg by mouth 2 times a day., Disp: , Rfl:   •  colesevelam (WELCHOL) 625 MG Tab, TAKE THREE TABLETS BY MOUTH TWICE A DAY WITH FOOD, Disp: 540 Tab, Rfl: 2  •  estradiol (ESTRACE) 2 MG Tab, Take 2 mg by mouth every day., Disp: , Rfl:   •  vitamin D, Ergocalciferol, (DRISDOL) 49935 units Cap capsule, TAKE ONE CAPSULE BY MOUTH ONCE EVERY 7 DAYS (Patient not taking: Reported on 2/2/2019), Disp: 12 Cap, Rfl: 1  •  ESTROGENS CONJUGATED PO, Take  by mouth., Disp: , Rfl:     ALLERGIES:   Allergies   Allergen Reactions   • Minocycline Hives     Lip swelling     SURGHX:   Past Surgical History:   Procedure Laterality Date  "  • ABDOMINAL HYSTERECTOMY TOTAL  6/24/2014    Performed by Hernandez Kilpatrick M.D. at SURGERY SAME DAY [facility]S   • HYSTEROSCOPY WITH VIDEO OPERATIVE  11/25/2013    Performed by Hernandez Kilpatrick M.D. at SURGERY [facility] ORS     SOCHX:  reports that she has never smoked. She has never used smokeless tobacco. She reports that she drinks alcohol. She reports that she does not use drugs.     FH: Reviewed with patient, not pertinent to this visit.     Review of Systems   Constitutional: Positive for fever and malaise/fatigue.   HENT: Positive for sore throat.    Eyes: Negative.    Respiratory: Positive for cough. Negative for shortness of breath.    Cardiovascular: Negative.  Negative for chest pain.   Gastrointestinal: Negative.    Genitourinary: Negative.    Musculoskeletal: Negative.    Skin: Negative.    Neurological: Positive for headaches. Negative for dizziness, tingling, sensory change, focal weakness and weakness.   Psychiatric/Behavioral: Negative.    All other systems reviewed and are negative.    Objective:     /80   Pulse (!) 113   Temp (!) 38.4 °C (101.2 °F) (Temporal)   Resp 14   Ht 1.651 m (5' 5\")   Wt 79.4 kg (175 lb)   LMP 05/01/2014   SpO2 93%   BMI 29.12 kg/m²     Physical Exam   Constitutional: She is oriented to person, place, and time. She appears well-developed and well-nourished. She is cooperative. No distress.   HENT:   Head: Normocephalic.   Right Ear: Tympanic membrane normal.   Left Ear: Tympanic membrane normal.   Nose: Nose normal.   Mouth/Throat: Uvula is midline and mucous membranes are normal. Posterior oropharyngeal edema and posterior oropharyngeal erythema present. No oropharyngeal exudate.   Eyes: Pupils are equal, round, and reactive to light. Conjunctivae and EOM are normal.   Neck: Normal range of motion.   Cardiovascular: Regular rhythm, normal heart sounds and normal pulses.  Tachycardia present.    Pulmonary/Chest: Effort normal and breath sounds " normal. No respiratory distress.   Abdominal: Soft. Bowel sounds are normal.   Musculoskeletal: Normal range of motion. She exhibits no deformity.   Lymphadenopathy:     She has no cervical adenopathy.   Neurological: She is alert and oriented to person, place, and time. She has normal strength. No sensory deficit.   Skin: Skin is warm and dry. Capillary refill takes less than 2 seconds.   Psychiatric: She has a normal mood and affect.   Vitals reviewed.    Influenza A/B swab: positive A       Assessment/Plan:     1. Influenza A  - oseltamivir (TAMIFLU) 75 MG Cap; Take 1 Cap by mouth 2 times a day for 5 days.  Dispense: 10 Cap; Refill: 0  - benzonatate (TESSALON) 200 MG capsule; Take 1 Cap by mouth every 8 hours as needed for Cough.  Dispense: 21 Cap; Refill: 0    2. Cough  - POCT Influenza A/B    Discussed supportive measures including increasing fluids and rest as well as OTC symptom management including acetaminophen and/or ibuprofen PRN pain and/or fever. Pt requesting stronger cough medication. Rx sent electronically.    Patient advised to: Return for 1) Symptoms don't improve or worsen, or go to ER, 2) Follow up with primary care in 7-10 days.    Differential diagnosis, natural history, supportive care, and indications for immediate follow-up discussed. All questions answered. Patient agrees with the plan of care.

## 2019-02-03 NOTE — PATIENT INSTRUCTIONS

## 2019-04-10 ENCOUNTER — OFFICE VISIT (OUTPATIENT)
Dept: MEDICAL GROUP | Facility: PHYSICIAN GROUP | Age: 49
End: 2019-04-10
Payer: COMMERCIAL

## 2019-04-10 VITALS
DIASTOLIC BLOOD PRESSURE: 58 MMHG | HEIGHT: 66 IN | SYSTOLIC BLOOD PRESSURE: 102 MMHG | BODY MASS INDEX: 29.41 KG/M2 | HEART RATE: 68 BPM | WEIGHT: 183 LBS | TEMPERATURE: 98.2 F | OXYGEN SATURATION: 98 %

## 2019-04-10 DIAGNOSIS — F32.A ANXIETY AND DEPRESSION: ICD-10-CM

## 2019-04-10 DIAGNOSIS — F41.9 ANXIETY AND DEPRESSION: ICD-10-CM

## 2019-04-10 DIAGNOSIS — E78.2 MIXED HYPERLIPIDEMIA: ICD-10-CM

## 2019-04-10 DIAGNOSIS — G89.29 CHRONIC PAIN OF BOTH SHOULDERS: ICD-10-CM

## 2019-04-10 DIAGNOSIS — M25.512 CHRONIC PAIN OF BOTH SHOULDERS: ICD-10-CM

## 2019-04-10 DIAGNOSIS — M25.511 CHRONIC PAIN OF BOTH SHOULDERS: ICD-10-CM

## 2019-04-10 DIAGNOSIS — R63.5 WEIGHT GAIN: ICD-10-CM

## 2019-04-10 PROBLEM — Z00.00 WELL ADULT HEALTH CHECK: Status: RESOLVED | Noted: 2018-09-21 | Resolved: 2019-04-10

## 2019-04-10 PROCEDURE — 99214 OFFICE O/P EST MOD 30 MIN: CPT | Performed by: FAMILY MEDICINE

## 2019-04-10 NOTE — PROGRESS NOTES
cc: Shoulder pain    Subjective:     Any Schwarz is a 48 y.o. female presenting for a 4-month follow-up.      1.anxiety and depression  This is a chronic medical problem for which she has been taking Celexa.  She was previously on 20 mg and then 4 months ago her dose was increased to 40 mg daily.  She is tolerating the dose but does mention that she lost her cousin to cancer in December 2018.  She is very close to this cousin and it has affected her.  She lives with her mom and does have support through friends and family.  Denies any suicidal or homicidal ideations.     2.Weight gain  She has gained approximately 8 pounds over the last few months.  Currently not exercising or eating healthy.  Gave up sugar for Lent.  Will have a glass of wine with mom after work.  Has complained of some heartburn which has resolved with Tums.    3. Bilateral shoulder pain         This is a chronic medical problem for which she was evaluated approximately 2 years ago.  At that time imaging was done as well as a cardiac work-up which was negative.  She continues to have bilateral shoulder pain, left greater than right and denies any numbness or tingling down the arms.  There is been no history of trauma.  Will occasionally take Tylenol when she has the pain, most taking approximately 3 tablets of Tylenol 3 times a week.  She has full range of motion but does complain of increased tenderness with abduction and abduction as well as external rotation.      4.  Hyperlipidemia  This is a chronic medical problem for which she continues to take WelChol 625 mg 3 times a day.  She denies any chest pain, shortness of breath or coughing.  Her most recent labs were done in October 2018 with an LDL of 103.    Review of systems:  See above and negative for fever, chills, nausea vomiting, abdominal pain.    Allergies   Allergen Reactions   • Minocycline Hives     Lip swelling         Current Outpatient Prescriptions:   •  Cholecalciferol  "(VITAMIN D PO), Take  by mouth., Disp: , Rfl:   •  citalopram (CELEXA) 40 MG Tab, Take 1 Tab by mouth every day., Disp: 90 Tab, Rfl: 3  •  colesevelam (WELCHOL) 625 MG Tab, TAKE THREE TABLETS BY MOUTH TWICE A DAY WITH FOOD, Disp: 540 Tab, Rfl: 2  •  estradiol (ESTRACE) 2 MG Tab, Take 2 mg by mouth every day., Disp: , Rfl:   •  valACYclovir (VALTREX) 500 MG Tab, Take 500 mg by mouth 2 times a day., Disp: , Rfl:     Allergies, past medical history, past surgical history, family history, social history reviewed and updated    Objective:     Vitals: /58 (BP Location: Right arm, Patient Position: Sitting, BP Cuff Size: Adult)   Pulse 68   Temp 36.8 °C (98.2 °F) (Temporal)   Ht 1.676 m (5' 6\")   Wt 83 kg (183 lb)   LMP 05/01/2014   SpO2 98%   BMI 29.54 kg/m²   General:  Alert, pleasant, NAD  Eyes:  normal inspection of conjunctivae and lids, EOMI,   ENMT:  External ears and nose are normal.    Neck  supple,   Heart:  Regular rate and rhythm,  No LE edema  Respiratory:  Normal respiratory effort, Clear to auscultation bilaterally.  Abdomen:   soft, Non-distended,   Skin:  Warm, dry, no rashes,   Musculoskeletal: Tenderness on palpation to the left shoulder.  Limited abduction abduction and external rotation.    Normal gait, Normal digits and nails.  Neurological: No tremors,   Psych:  flat Affect/mood, judgement is good, memory is intact, grooming is appropriate.    Assessment/Plan:     Any was seen today for anxiety.    Diagnoses and all orders for this visit:    Anxiety and depression  Chronic medical problem.  Continue with Celexa 40 mg.  Discussed with her referral to psychology or another support group, patient currently declines.  Has a counselor at her school and she would prefer to go that route instead.    Mixed hyperlipidemia  Chronic medical problem.  Stable with WelChol.    Chronic pain of both shoulders  Chronic medical problem.  Not improving.  Imaging from 2 years ago did show calcific " tendinitis.  Willing to try physical therapy.  -     REFERRAL TO PHYSICAL THERAPY Reason for Therapy: Eval/Treat/Report    Weight gain  Chronic medical problem.  Encouraged exercise, limiting carbohydrates and alcohol. And lifestyle modifications discussed with her.        Return in about 4 months (around 8/10/2019) for f/u depression.

## 2019-05-14 DIAGNOSIS — E78.2 MIXED HYPERLIPIDEMIA: ICD-10-CM

## 2019-05-15 RX ORDER — COLESEVELAM 180 1/1
TABLET ORAL
Qty: 180 TAB | Refills: 1 | Status: SHIPPED | OUTPATIENT
Start: 2019-05-15 | End: 2019-11-01 | Stop reason: SDUPTHER

## 2019-05-15 NOTE — TELEPHONE ENCOUNTER
Requested Prescriptions     Pending Prescriptions Disp Refills   • colesevelam (WELCHOL) 625 MG Tab [Pharmacy Med Name: COLESEVELAM 625 MG TABLET] 180 Tab 1     Sig: TAKE THREE TABLETS BY MOUTH TWICE A DAY TAKE WITH FOOD (COLESEVELAM)   Savannah Cardozo M.D.

## 2019-05-15 NOTE — TELEPHONE ENCOUNTER
Was the patient seen in the last year in this department? Yes 04/10/19    Does patient have an active prescription for medications requested? No     Received Request Via: Pharmacy

## 2019-05-16 ENCOUNTER — OFFICE VISIT (OUTPATIENT)
Dept: URGENT CARE | Facility: PHYSICIAN GROUP | Age: 49
End: 2019-05-16
Payer: COMMERCIAL

## 2019-05-16 VITALS
WEIGHT: 185 LBS | BODY MASS INDEX: 29.73 KG/M2 | RESPIRATION RATE: 14 BRPM | DIASTOLIC BLOOD PRESSURE: 82 MMHG | TEMPERATURE: 98.1 F | SYSTOLIC BLOOD PRESSURE: 122 MMHG | OXYGEN SATURATION: 97 % | HEIGHT: 66 IN | HEART RATE: 74 BPM

## 2019-05-16 DIAGNOSIS — H00.012 HORDEOLUM EXTERNUM OF RIGHT LOWER EYELID: ICD-10-CM

## 2019-05-16 PROCEDURE — 99213 OFFICE O/P EST LOW 20 MIN: CPT | Performed by: PHYSICIAN ASSISTANT

## 2019-05-16 RX ORDER — ERYTHROMYCIN 5 MG/G
OINTMENT OPHTHALMIC
Qty: 1 TUBE | Refills: 0 | Status: SHIPPED | OUTPATIENT
Start: 2019-05-16 | End: 2019-08-19

## 2019-05-16 ASSESSMENT — ENCOUNTER SYMPTOMS
NAUSEA: 0
DIZZINESS: 0
EYE REDNESS: 0
BLURRED VISION: 0
DOUBLE VISION: 0
FEVER: 0
CHILLS: 0
EYE ITCHING: 0
EYE DISCHARGE: 0
PHOTOPHOBIA: 0
FOREIGN BODY SENSATION: 0
VOMITING: 0
HEADACHES: 0

## 2019-05-16 NOTE — PROGRESS NOTES
Subjective:   Any Schwarz is a 48 y.o. female who presents for Eye Problem (poss sty on eye )       Eye Problem    The right eye is affected. This is a new problem. The current episode started in the past 7 days. The problem occurs constantly. The problem has been gradually improving. There was no injury mechanism. The pain is mild. There is no known exposure to pink eye. She does not wear contacts. Pertinent negatives include no blurred vision, eye discharge, double vision, eye redness, fever, foreign body sensation, itching, nausea, photophobia, recent URI or vomiting. Treatments tried: warm compresses. The treatment provided significant relief.     Review of Systems   Constitutional: Negative for chills and fever.   Eyes: Negative for blurred vision, double vision, photophobia, discharge, redness and itching.        Positive for right lower eyelid pain and swelling   Gastrointestinal: Negative for nausea and vomiting.   Neurological: Negative for dizziness and headaches.       PMH:  has a past medical history of Anesthesia; Anxiety; Arthritis; Cancer ([facility]) (1990); Depression; Heart burn; High cholesterol; Other specified disorder of intestines; Other specified symptom associated with female genital organs; and Unspecified urinary incontinence. She also has no past medical history of Breast cancer ([facility]).    MEDS:   Current Outpatient Prescriptions:   •  erythromycin 5 MG/GM Ointment, Place 1 cm ribbon in right eye twice daily for 7 days, Disp: 1 Tube, Rfl: 0  •  colesevelam (WELCHOL) 625 MG Tab, TAKE THREE TABLETS BY MOUTH TWICE A DAY TAKE WITH FOOD (COLESEVELAM), Disp: 180 Tab, Rfl: 1  •  citalopram (CELEXA) 40 MG Tab, Take 1 Tab by mouth every day., Disp: 90 Tab, Rfl: 3  •  valACYclovir (VALTREX) 500 MG Tab, Take 500 mg by mouth 2 times a day., Disp: , Rfl:   •  Cholecalciferol (VITAMIN D PO), Take  by mouth., Disp: , Rfl:   •  estradiol (ESTRACE) 2 MG Tab, Take 2 mg by mouth every day., Disp: , Rfl:  "    ALLERGIES:   Allergies   Allergen Reactions   • Minocycline Hives     Lip swelling       SURGHX:   Past Surgical History:   Procedure Laterality Date   • ABDOMINAL HYSTERECTOMY TOTAL  6/24/2014    Performed by Hernandez Kilpatrick M.D. at SURGERY SAME DAY [facility] ORS   • HYSTEROSCOPY WITH VIDEO OPERATIVE  11/25/2013    Performed by Hernandez Kilpatrick M.D. at [facility]       SOCHX:  reports that she has never smoked. She has never used smokeless tobacco. She reports that she drinks alcohol. She reports that she does not use drugs.    FH: Reviewed with patient, not pertinent to this visit.     Objective:   /82   Pulse 74   Temp 36.7 °C (98.1 °F) (Temporal)   Resp 14   Ht 1.676 m (5' 6\")   Wt 83.9 kg (185 lb)   LMP 05/01/2014   SpO2 97%   BMI 29.86 kg/m²   Physical Exam   Constitutional: She is oriented to person, place, and time. She appears well-developed and well-nourished. No distress.   HENT:   Head: Normocephalic and atraumatic.   Nose: Nose normal.   Eyes: Pupils are equal, round, and reactive to light. Conjunctivae and EOM are normal. Right eye exhibits hordeolum (right lower lid).   Neck: Normal range of motion. No tracheal deviation present.   Pulmonary/Chest: Effort normal. No respiratory distress.   Musculoskeletal:   ROM normal all four extremities   Neurological: She is alert and oriented to person, place, and time.   Skin: Skin is warm and dry.   Psychiatric: She has a normal mood and affect. Her behavior is normal. Judgment and thought content normal.   Vitals reviewed.      Assessment/Plan:   1. Hordeolum externum of right lower eyelid  - erythromycin 5 MG/GM Ointment; Place 1 cm ribbon in right eye twice daily for 7 days  Dispense: 1 Tube; Refill: 0    - Advised to continue warm compresses daily  - Advised on proper eye ointment application  - Advised to return if symptoms worsen or do not improve    Differential diagnosis, natural history, supportive care, and " indications for immediate follow-up discussed.

## 2019-06-25 ENCOUNTER — RX ONLY (OUTPATIENT)
Age: 49
Setting detail: RX ONLY
End: 2019-06-25

## 2019-06-25 RX ORDER — VALACYCLOVIR HYDROCHLORIDE 1 G/1
2 TABLET, FILM COATED ORAL BID
Qty: 16 | Refills: 1 | Status: ERX | COMMUNITY
Start: 2019-06-25

## 2019-08-12 ENCOUNTER — APPOINTMENT (OUTPATIENT)
Dept: MEDICAL GROUP | Facility: PHYSICIAN GROUP | Age: 49
End: 2019-08-12
Payer: COMMERCIAL

## 2019-08-19 ENCOUNTER — OFFICE VISIT (OUTPATIENT)
Dept: MEDICAL GROUP | Facility: PHYSICIAN GROUP | Age: 49
End: 2019-08-19
Payer: COMMERCIAL

## 2019-08-19 VITALS
OXYGEN SATURATION: 94 % | TEMPERATURE: 97.7 F | WEIGHT: 187 LBS | DIASTOLIC BLOOD PRESSURE: 80 MMHG | BODY MASS INDEX: 30.05 KG/M2 | HEIGHT: 66 IN | SYSTOLIC BLOOD PRESSURE: 118 MMHG | HEART RATE: 75 BPM

## 2019-08-19 DIAGNOSIS — E78.2 MIXED HYPERLIPIDEMIA: ICD-10-CM

## 2019-08-19 DIAGNOSIS — F32.A ANXIETY AND DEPRESSION: ICD-10-CM

## 2019-08-19 DIAGNOSIS — Z23 NEED FOR VACCINATION: ICD-10-CM

## 2019-08-19 DIAGNOSIS — F41.9 ANXIETY AND DEPRESSION: ICD-10-CM

## 2019-08-19 PROCEDURE — 90471 IMMUNIZATION ADMIN: CPT | Performed by: FAMILY MEDICINE

## 2019-08-19 PROCEDURE — 99214 OFFICE O/P EST MOD 30 MIN: CPT | Mod: 25 | Performed by: FAMILY MEDICINE

## 2019-08-19 PROCEDURE — 90715 TDAP VACCINE 7 YRS/> IM: CPT | Performed by: FAMILY MEDICINE

## 2019-08-19 RX ORDER — CITALOPRAM 20 MG/1
20 TABLET ORAL DAILY
Qty: 90 TAB | Refills: 2 | Status: SHIPPED | OUTPATIENT
Start: 2019-08-19 | End: 2020-10-05

## 2019-08-19 ASSESSMENT — PAIN SCALES - GENERAL: PAINLEVEL: NO PAIN

## 2019-08-20 NOTE — PROGRESS NOTES
"cc: Anxiety and depression    Subjective:     Any Schwarz is a 49 y.o. female presenting for follow-up of her chronic medical diagnoses.    1. Anxiety and depression  Chronic medical diagnosis.  Is previously on Celexa 20 mg and this dose was increased to 40 mg in April.  She mentions that January is a difficult month for her as she has several anniversaries of fluctuance who passed away during that month.  She has been feeling pretty good denies any depression or anxiety.  Is requesting that her Celexa be lowered to 20 mg dose.    2. Mixed hyperlipidemia  Chronic medical diagnosis.  Mentions that she cannot tolerate a statin.  Had been on many different ones in the past continue to have lower extremity pain and cramps.  Currently taking WelChol 625 mg 3 tablets twice a day.  Recent labs from October 2018 do have total cholesterol 179, triglycerides 135, HDL 49, and .    3. Need for vaccination  She would like to get her Tdap vaccine today      Review of systems:  See above and negative for fever chills, chest pain, shortness of breath  Allergies   Allergen Reactions   • Minocycline Hives     Lip swelling         Current Outpatient Medications:   •  citalopram (CELEXA) 20 MG Tab, Take 1 Tab by mouth every day., Disp: 90 Tab, Rfl: 2  •  colesevelam (WELCHOL) 625 MG Tab, TAKE THREE TABLETS BY MOUTH TWICE A DAY TAKE WITH FOOD (COLESEVELAM), Disp: 180 Tab, Rfl: 1  •  Cholecalciferol (VITAMIN D PO), Take  by mouth., Disp: , Rfl:   •  valACYclovir (VALTREX) 500 MG Tab, Take 500 mg by mouth 2 times a day., Disp: , Rfl:   •  estradiol (ESTRACE) 2 MG Tab, Take 2 mg by mouth every day., Disp: , Rfl:     Allergies, past medical history, past surgical history, family history, social history reviewed and updated    Objective:     Vitals: /80 (BP Location: Left arm, Patient Position: Sitting, BP Cuff Size: Adult)   Pulse 75   Temp 36.5 °C (97.7 °F)   Ht 1.676 m (5' 6\")   Wt 84.8 kg (187 lb)   LMP " 05/01/2014   SpO2 94%   BMI 30.18 kg/m²   General:  Alert, pleasant, NAD  Eyes:  normal inspection of conjunctivae and lids, EOMI,   ENMT:  External ears and nose are normal.    Neck  supple,   Heart:  Regular rate and rhythm,  No LE edema  Respiratory:  Normal respiratory effort, Clear to auscultation bilaterally.  Abdomen:   soft, Non-distended,   Skin:  Warm, dry, no rashes,   Musculoskeletal:  Normal gait, Normal digits and nails.  Neurological: No tremors,   Psych:   Affect/mood is normal, judgement is good, memory is intact, grooming is appropriate.    Assessment/Plan:     Any was seen today for results.    Diagnoses and all orders for this visit:    Anxiety and depression  Chronic medical diagnosis.  Improving.  Lower Celexa to 20 mg daily.  Patient encouraged to stay in touch with me over my chart we need to increase her dose back up to 40 mg.  -     citalopram (CELEXA) 20 MG Tab; Take 1 Tab by mouth every day.    Mixed hyperlipidemia  Chronic medical diagnosis.  Stable with alcohol.  -     Lipid Profile; Future  -     Comp Metabolic Panel; Future  -     CBC WITH DIFFERENTIAL; Future    Need for vaccination  -     Tdap =>8yo IM          Return in about 6 months (around 2/19/2020) for f/u depression.

## 2019-11-01 DIAGNOSIS — E78.2 MIXED HYPERLIPIDEMIA: ICD-10-CM

## 2019-11-04 RX ORDER — COLESEVELAM 180 1/1
TABLET ORAL
Qty: 180 TAB | Refills: 2 | Status: SHIPPED | OUTPATIENT
Start: 2019-11-04 | End: 2020-07-13

## 2019-11-15 ENCOUNTER — APPOINTMENT (RX ONLY)
Dept: URBAN - METROPOLITAN AREA CLINIC 4 | Facility: CLINIC | Age: 49
Setting detail: DERMATOLOGY
End: 2019-11-15

## 2019-11-15 DIAGNOSIS — Z85.820 PERSONAL HISTORY OF MALIGNANT MELANOMA OF SKIN: ICD-10-CM

## 2019-11-15 DIAGNOSIS — L60.9 NAIL DISORDER, UNSPECIFIED: ICD-10-CM

## 2019-11-15 DIAGNOSIS — L81.4 OTHER MELANIN HYPERPIGMENTATION: ICD-10-CM

## 2019-11-15 DIAGNOSIS — I83.9 ASYMPTOMATIC VARICOSE VEINS OF LOWER EXTREMITIES: ICD-10-CM

## 2019-11-15 DIAGNOSIS — D22 MELANOCYTIC NEVI: ICD-10-CM

## 2019-11-15 DIAGNOSIS — L82.1 OTHER SEBORRHEIC KERATOSIS: ICD-10-CM

## 2019-11-15 DIAGNOSIS — D18.0 HEMANGIOMA: ICD-10-CM

## 2019-11-15 PROBLEM — D22.62 MELANOCYTIC NEVI OF LEFT UPPER LIMB, INCLUDING SHOULDER: Status: ACTIVE | Noted: 2019-11-15

## 2019-11-15 PROBLEM — D18.01 HEMANGIOMA OF SKIN AND SUBCUTANEOUS TISSUE: Status: ACTIVE | Noted: 2019-11-15

## 2019-11-15 PROBLEM — I83.90 ASYMPTOMATIC VARICOSE VEINS OF UNSPECIFIED LOWER EXTREMITY: Status: ACTIVE | Noted: 2019-11-15

## 2019-11-15 PROBLEM — D22.5 MELANOCYTIC NEVI OF TRUNK: Status: ACTIVE | Noted: 2019-11-15

## 2019-11-15 PROBLEM — D22.61 MELANOCYTIC NEVI OF RIGHT UPPER LIMB, INCLUDING SHOULDER: Status: ACTIVE | Noted: 2019-11-15

## 2019-11-15 PROBLEM — D22.72 MELANOCYTIC NEVI OF LEFT LOWER LIMB, INCLUDING HIP: Status: ACTIVE | Noted: 2019-11-15

## 2019-11-15 PROBLEM — D22.71 MELANOCYTIC NEVI OF RIGHT LOWER LIMB, INCLUDING HIP: Status: ACTIVE | Noted: 2019-11-15

## 2019-11-15 PROCEDURE — ? SUNSCREEN RECOMMENDATIONS

## 2019-11-15 PROCEDURE — ? COUNSELING

## 2019-11-15 PROCEDURE — 99214 OFFICE O/P EST MOD 30 MIN: CPT

## 2019-11-15 ASSESSMENT — LOCATION DETAILED DESCRIPTION DERM
LOCATION DETAILED: LEFT MIDDLE FINGERNAIL
LOCATION DETAILED: LEFT CENTRAL MALAR CHEEK
LOCATION DETAILED: LEFT PROXIMAL POSTERIOR UPPER ARM
LOCATION DETAILED: LEFT ULNAR DORSAL HAND
LOCATION DETAILED: LEFT INFERIOR ANTERIOR NECK
LOCATION DETAILED: SUPERIOR THORACIC SPINE
LOCATION DETAILED: RIGHT SUPERIOR MEDIAL MIDBACK
LOCATION DETAILED: RIGHT RADIAL DORSAL HAND
LOCATION DETAILED: LEFT PROXIMAL DORSAL FOREARM
LOCATION DETAILED: PERIUMBILICAL SKIN
LOCATION DETAILED: LEFT DISTAL PRETIBIAL REGION
LOCATION DETAILED: LEFT ANTERIOR PROXIMAL THIGH
LOCATION DETAILED: LEFT SUPERIOR MEDIAL UPPER BACK
LOCATION DETAILED: RIGHT RIB CAGE
LOCATION DETAILED: RIGHT ANTERIOR PROXIMAL THIGH
LOCATION DETAILED: RIGHT PROXIMAL DORSAL FOREARM
LOCATION DETAILED: RIGHT DISTAL POSTERIOR UPPER ARM
LOCATION DETAILED: RIGHT CENTRAL MALAR CHEEK

## 2019-11-15 ASSESSMENT — LOCATION SIMPLE DESCRIPTION DERM
LOCATION SIMPLE: ABDOMEN
LOCATION SIMPLE: RIGHT POSTERIOR UPPER ARM
LOCATION SIMPLE: LEFT UPPER BACK
LOCATION SIMPLE: LEFT MIDDLE FINGER
LOCATION SIMPLE: LEFT THIGH
LOCATION SIMPLE: RIGHT FOREARM
LOCATION SIMPLE: RIGHT HAND
LOCATION SIMPLE: RIGHT CHEEK
LOCATION SIMPLE: RIGHT LOWER BACK
LOCATION SIMPLE: LEFT POSTERIOR UPPER ARM
LOCATION SIMPLE: UPPER BACK
LOCATION SIMPLE: LEFT ANTERIOR NECK
LOCATION SIMPLE: LEFT FOREARM
LOCATION SIMPLE: RIGHT THIGH
LOCATION SIMPLE: LEFT CHEEK
LOCATION SIMPLE: LEFT HAND
LOCATION SIMPLE: LEFT PRETIBIAL REGION

## 2019-11-15 ASSESSMENT — LOCATION ZONE DERM
LOCATION ZONE: FACE
LOCATION ZONE: NECK
LOCATION ZONE: LEG
LOCATION ZONE: ARM
LOCATION ZONE: FINGER
LOCATION ZONE: HAND
LOCATION ZONE: TRUNK

## 2019-11-15 NOTE — HPI: FULL BODY SKIN EXAMINATION
How Severe Are Your Spot(S)?: mild
What Type Of Note Output Would You Prefer (Optional)?: Standard Output
What Is The Reason For Today's Visit?: Full Body Skin Examination with No Concerns
What Is The Reason For Today's Visit? (Being Monitored For X): concerning skin lesions on an annual basis
Additional History: Full body exam. No concerns at this time. Patient has history of MM left lower leg 1990.

## 2019-12-16 ENCOUNTER — OFFICE VISIT (OUTPATIENT)
Dept: URGENT CARE | Facility: PHYSICIAN GROUP | Age: 49
End: 2019-12-16
Payer: COMMERCIAL

## 2019-12-16 VITALS
OXYGEN SATURATION: 92 % | WEIGHT: 185 LBS | RESPIRATION RATE: 16 BRPM | BODY MASS INDEX: 29.73 KG/M2 | HEART RATE: 88 BPM | SYSTOLIC BLOOD PRESSURE: 142 MMHG | DIASTOLIC BLOOD PRESSURE: 84 MMHG | TEMPERATURE: 97 F | HEIGHT: 66 IN

## 2019-12-16 DIAGNOSIS — R05.9 COUGH: ICD-10-CM

## 2019-12-16 DIAGNOSIS — J11.1 INFLUENZA-LIKE ILLNESS: ICD-10-CM

## 2019-12-16 LAB
FLUAV+FLUBV AG SPEC QL IA: NEGATIVE
INT CON NEG: NEGATIVE
INT CON POS: POSITIVE

## 2019-12-16 PROCEDURE — 99214 OFFICE O/P EST MOD 30 MIN: CPT | Performed by: PHYSICIAN ASSISTANT

## 2019-12-16 PROCEDURE — 87804 INFLUENZA ASSAY W/OPTIC: CPT | Performed by: PHYSICIAN ASSISTANT

## 2019-12-16 RX ORDER — BENZONATATE 100 MG/1
200 CAPSULE ORAL 3 TIMES DAILY PRN
Qty: 30 CAP | Refills: 0 | Status: SHIPPED
Start: 2019-12-16 | End: 2020-02-20

## 2019-12-16 ASSESSMENT — ENCOUNTER SYMPTOMS
HEADACHES: 1
CHILLS: 1
MYALGIAS: 1
VOMITING: 0
DIARRHEA: 0
ABDOMINAL PAIN: 0
SPUTUM PRODUCTION: 0
COUGH: 1
EYE REDNESS: 0
WHEEZING: 0
FEVER: 0
SORE THROAT: 0
SHORTNESS OF BREATH: 0
EYE DISCHARGE: 0

## 2019-12-16 NOTE — PROGRESS NOTES
"Subjective:      Any Schwarz is a 49 y.o. female who presents with Cough ([id number] )            Cough   The current episode started yesterday. The problem has been unchanged. The problem occurs every few minutes. The cough is non-productive. Associated symptoms include chills, headaches and myalgias. Pertinent negatives include no eye redness, fever, rash, sore throat, shortness of breath or wheezing. Nothing aggravates the symptoms. Treatments tried: OTC \"spray\" for immunity.  The treatment provided no relief. There is no history of asthma or bronchitis.       Review of Systems   Constitutional: Positive for chills and malaise/fatigue. Negative for fever.   HENT: Negative for congestion and sore throat.    Eyes: Negative for discharge and redness.   Respiratory: Positive for cough. Negative for sputum production, shortness of breath and wheezing.    Gastrointestinal: Negative for abdominal pain, diarrhea and vomiting.   Musculoskeletal: Positive for myalgias.   Skin: Negative for rash.   Neurological: Positive for headaches.   All other systems reviewed and are negative.         Objective:     /84   Pulse 88   Temp 36.1 °C (97 °F) (Temporal)   Resp 16   Ht 1.676 m (5' 6\")   Wt 83.9 kg (185 lb)   LMP 05/01/2014   SpO2 92%   BMI 29.86 kg/m²    PMH:  has a past medical history of Anesthesia, Anxiety, Arthritis, Cancer ([facility]) (1990), Depression, Heart burn, High cholesterol, Other specified disorder of intestines, Other specified symptom associated with female genital organs, and Unspecified urinary incontinence. She also has no past medical history of Breast cancer ([facility]).  MEDS: Reviewed .   ALLERGIES:   Allergies   Allergen Reactions   • Minocycline Hives     Lip swelling     SURGHX:   Past Surgical History:   Procedure Laterality Date   • ABDOMINAL HYSTERECTOMY TOTAL  6/24/2014    Performed by Hernandez Kilpatrick M.D. at SURGERY SAME DAY [facility]   • HYSTEROSCOPY WITH VIDEO OPERATIVE  " 11/25/2013    Performed by Hernandez Kilpatrick M.D. at [facility]     SOCHX:  reports that she has never smoked. She has never used smokeless tobacco. She reports current alcohol use. She reports that she does not use drugs.  FH: Family history was reviewed, no pertinent findings to report        Physical Exam  Vitals signs reviewed.   Constitutional:       Appearance: Normal appearance. She is well-developed.   HENT:      Head: Normocephalic and atraumatic.      Ears:      Comments: Bilateral clear middle ear effusions.     Nose: Nose normal.      Mouth/Throat:      Comments: Posterior oropharynx is erythematous, positive postnasal drainage.  No evidence of exudate.  Eyes:      Conjunctiva/sclera: Conjunctivae normal.      Pupils: Pupils are equal, round, and reactive to light.   Neck:      Musculoskeletal: Normal range of motion and neck supple.   Cardiovascular:      Rate and Rhythm: Normal rate and regular rhythm.      Heart sounds: No murmur.   Pulmonary:      Effort: Pulmonary effort is normal. No respiratory distress.      Breath sounds: Normal breath sounds.   Musculoskeletal: Normal range of motion.      Right lower leg: No edema.      Left lower leg: No edema.   Lymphadenopathy:      Cervical: No cervical adenopathy.   Skin:     General: Skin is warm.      Findings: No rash.   Neurological:      Mental Status: She is alert and oriented to person, place, and time.   Psychiatric:         Mood and Affect: Mood normal.         Behavior: Behavior normal.         Thought Content: Thought content normal.                 Assessment/Plan:       1. Influenza-like illness  - POCT Influenza A/B  - benzonatate (TESSALON) 100 MG Cap; Take 2 Caps by mouth 3 times a day as needed for Cough.  Dispense: 30 Cap; Refill: 0    2. Cough    Lungs clear, neg. Influenza- discussed the above- most likely viral in nature.    Encouraged OTC supportive meds PRN. Humidification, increase fluids, avoid night time dairy.    Patient given precautionary s/sx that mandate immediate follow up and evaluation in the ED. Advised of risks of not doing so.    DDX, Supportive care, and indications for immediate follow-up discussed with patient.    Instructed to return to clinic or nearest emergency department if we are not available for any change in condition, further concerns, or worsening of symptoms.    The patient demonstrated a good understanding and agreed with the treatment plan.

## 2020-01-23 ENCOUNTER — APPOINTMENT (RX ONLY)
Dept: URBAN - METROPOLITAN AREA CLINIC 4 | Facility: CLINIC | Age: 50
Setting detail: DERMATOLOGY
End: 2020-01-23

## 2020-01-23 DIAGNOSIS — Z85.820 PERSONAL HISTORY OF MALIGNANT MELANOMA OF SKIN: ICD-10-CM

## 2020-01-23 DIAGNOSIS — L60.9 NAIL DISORDER, UNSPECIFIED: ICD-10-CM

## 2020-01-23 PROCEDURE — ? COUNSELING

## 2020-01-23 PROCEDURE — 99212 OFFICE O/P EST SF 10 MIN: CPT

## 2020-01-23 ASSESSMENT — LOCATION SIMPLE DESCRIPTION DERM
LOCATION SIMPLE: LEFT PRETIBIAL REGION
LOCATION SIMPLE: LEFT MIDDLE FINGERNAIL

## 2020-01-23 ASSESSMENT — LOCATION DETAILED DESCRIPTION DERM
LOCATION DETAILED: LEFT MIDDLE FINGERNAIL
LOCATION DETAILED: LEFT DISTAL PRETIBIAL REGION

## 2020-01-23 ASSESSMENT — LOCATION ZONE DERM
LOCATION ZONE: LEG
LOCATION ZONE: FINGERNAIL

## 2020-01-23 NOTE — PROCEDURE: COUNSELING
Quality 137: Melanoma: Continuity Of Care - Recall System: Patient information entered into a recall system that includes: target date for the next exam specified AND a process to follow up with patients regarding missed or unscheduled appointments
Quality 224: Stage 0-Iic Melanoma: Overutilization Of Imaging Studies For Only Stage 0-Iic Melanoma: None of the following diagnostic imaging studies ordered: chest X-ray, CT, Ultrasound, MRI, PET, or nuclear medicine scans (ML)
Detail Level: Detailed
Patient Specific Counseling (Will Not Stick From Patient To Patient): New nail growth coming in normal. Will continue to wait and monitor.

## 2020-01-23 NOTE — HPI: FOLLOW UP OTHER
What Is Your Reason For Requesting A Follow-Up Appointment?: Follow up on divot in nail of the left hand middle finger. Patient states the new nail growth is coming in normal.
No risk alerts present

## 2020-02-20 ENCOUNTER — OFFICE VISIT (OUTPATIENT)
Dept: MEDICAL GROUP | Facility: PHYSICIAN GROUP | Age: 50
End: 2020-02-20
Payer: COMMERCIAL

## 2020-02-20 VITALS
RESPIRATION RATE: 20 BRPM | HEART RATE: 70 BPM | BODY MASS INDEX: 30.37 KG/M2 | TEMPERATURE: 98.1 F | HEIGHT: 66 IN | SYSTOLIC BLOOD PRESSURE: 108 MMHG | DIASTOLIC BLOOD PRESSURE: 70 MMHG | OXYGEN SATURATION: 95 % | WEIGHT: 189 LBS

## 2020-02-20 DIAGNOSIS — Z12.11 SCREENING FOR COLORECTAL CANCER: ICD-10-CM

## 2020-02-20 DIAGNOSIS — F32.A ANXIETY AND DEPRESSION: ICD-10-CM

## 2020-02-20 DIAGNOSIS — E78.2 MIXED HYPERLIPIDEMIA: ICD-10-CM

## 2020-02-20 DIAGNOSIS — Z23 NEED FOR VACCINATION: ICD-10-CM

## 2020-02-20 DIAGNOSIS — Z12.12 SCREENING FOR COLORECTAL CANCER: ICD-10-CM

## 2020-02-20 DIAGNOSIS — R41.3 MEMORY LOSS: ICD-10-CM

## 2020-02-20 DIAGNOSIS — F41.9 ANXIETY AND DEPRESSION: ICD-10-CM

## 2020-02-20 PROCEDURE — 99214 OFFICE O/P EST MOD 30 MIN: CPT | Mod: 25 | Performed by: FAMILY MEDICINE

## 2020-02-20 PROCEDURE — 90471 IMMUNIZATION ADMIN: CPT | Performed by: FAMILY MEDICINE

## 2020-02-20 PROCEDURE — 90686 IIV4 VACC NO PRSV 0.5 ML IM: CPT | Performed by: FAMILY MEDICINE

## 2020-02-20 RX ORDER — VALACYCLOVIR HYDROCHLORIDE 1 G/1
TABLET, FILM COATED ORAL
COMMUNITY
Start: 2020-01-12 | End: 2020-02-20

## 2020-02-20 SDOH — HEALTH STABILITY: MENTAL HEALTH: HOW OFTEN DO YOU HAVE A DRINK CONTAINING ALCOHOL?: 2-3 TIMES A WEEK

## 2020-02-20 SDOH — HEALTH STABILITY: MENTAL HEALTH: HOW MANY STANDARD DRINKS CONTAINING ALCOHOL DO YOU HAVE ON A TYPICAL DAY?: 1 OR 2

## 2020-02-20 SDOH — HEALTH STABILITY: MENTAL HEALTH: HOW OFTEN DO YOU HAVE 6 OR MORE DRINKS ON ONE OCCASION?: NEVER

## 2020-02-20 ASSESSMENT — PATIENT HEALTH QUESTIONNAIRE - PHQ9
9. THOUGHTS THAT YOU WOULD BE BETTER OFF DEAD, OR OF HURTING YOURSELF: NOT AT ALL
3. TROUBLE FALLING OR STAYING ASLEEP OR SLEEPING TOO MUCH: MORE THAN HALF THE DAYS
6. FEELING BAD ABOUT YOURSELF - OR THAT YOU ARE A FAILURE OR HAVE LET YOURSELF OR YOUR FAMILY DOWN: NOT AL ALL
8. MOVING OR SPEAKING SO SLOWLY THAT OTHER PEOPLE COULD HAVE NOTICED. OR THE OPPOSITE, BEING SO FIGETY OR RESTLESS THAT YOU HAVE BEEN MOVING AROUND A LOT MORE THAN USUAL: NOT AT ALL
4. FEELING TIRED OR HAVING LITTLE ENERGY: NEARLY EVERY DAY
5. POOR APPETITE OR OVEREATING: NOT AT ALL
2. FEELING DOWN, DEPRESSED, IRRITABLE, OR HOPELESS: NOT AT ALL
7. TROUBLE CONCENTRATING ON THINGS, SUCH AS READING THE NEWSPAPER OR WATCHING TELEVISION: NEARLY EVERY DAY
SUM OF ALL RESPONSES TO PHQ QUESTIONS 1-9: 8
1. LITTLE INTEREST OR PLEASURE IN DOING THINGS: NOT AT ALL
SUM OF ALL RESPONSES TO PHQ9 QUESTIONS 1 AND 2: 0

## 2020-02-21 NOTE — PROGRESS NOTES
cc: GI referral for colonoscopy    Subjective:     Any Schwarz is a 49 y.o. female presenting for a 6-month follow-up and to obtain a GI referral for colonoscopy.    1. Need for vaccination  would like flu vaccine.    2. Screening for colorectal cancer  Would like GI referral.  This was initially placed by her gynecologist.  She has never had a colonoscopy or a fit test.    3. Anxiety and depression  Chronic.  Was on paxil before and been on Celexa 40 mg.  Her dose was decreased to 20 mg last year.  She has been doing well on this dose.  Mentions that she was initially started on this medication for depression and anxiety and over the last few years she has been utilizing it mainly for anxiety.  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    4. Memory loss  New medical problem.  Has had a hard time findng the right words x 3-4 yrs. Short term memory loss.  Feels like she is having dementia.  Mentions that she forgot about her appointment yesterday.     5. Hyperlipidemia  Chronic.  Taking welchol 3 tabs once a day.  Previous labs from October 2018 have improved total cholesterol 179, triglycerides 135, HDL 49, and .    Review of systems:  See above and negative fever chills.  Allergies   Allergen Reactions   • Minocycline Hives     Lip swelling         Current Outpatient Medications:   •  colesevelam (WELCHOL) 625 MG Tab, TAKE THREE TABLETS BY MOUTH TWICE A DAY TAKE WITH FOOD ( COLESEVELAM), Disp: 180 Tab, Rfl: 2  •  citalopram (CELEXA) 20 MG Tab, Take 1 Tab by mouth every day., Disp: 90 Tab, Rfl: 2  •  valACYclovir (VALTREX) 500 MG Tab, Take 500 mg by mouth 2 times a day., Disp: , Rfl:   •  estradiol (ESTRACE) 2 MG Tab, Take 1 mg by mouth every day., Disp: , Rfl:     Allergies, past medical history, past surgical history, family history, social history reviewed and updated    Objective:     Vitals: /70 (BP Location: Left arm, Patient Position: Sitting, BP Cuff Size:  "Adult)   Pulse 70   Temp 36.7 °C (98.1 °F) (Temporal)   Resp 20   Ht 1.676 m (5' 6\")   Wt 85.7 kg (189 lb)   LMP 05/01/2014   SpO2 95%   BMI 30.51 kg/m²   General:  Alert, pleasant, NAD  Eyes:  normal inspection of conjunctivae and lids, EOMI,   ENMT:  External ears and nose are normal.    Neck  supple,   Heart:  Regular rate and rhythm,  No LE edema  Respiratory:  Normal respiratory effort, Clear to auscultation bilaterally.  Abdomen:   soft, Non-distended,   Skin:  Warm, dry, no rashes,   Musculoskeletal:  Normal gait, Normal digits and nails.  Neurological: No tremors,   Psych:   Affect/mood is normal, judgement is good, memory is intact, grooming is appropriate.    Assessment/Plan:     Any was seen today for follow-up and orders needed.    Diagnoses and all orders for this visit:    Mixed hyperlipidemia  Chronic medical problem.  Improving.  Continue with WelChol.  We will recheck labs.  -     CBC WITH DIFFERENTIAL; Future  -     Comp Metabolic Panel; Future  -     Lipid Profile; Future    Need for vaccination  -     Influenza Vaccine Quad Injection (PF)    Screening for colorectal cancer  -     REFERRAL TO GI FOR COLONOSCOPY    Anxiety and depression  Chronic medical diagnosis.  Improving.  Continue with Celexa 20 mg.  Discussed with her that we do have other medications we can switch her to or refer her to therapist if necessary.    Memory loss  New medical diagnosis.  Not improving.  This is been going on for the last 3 to 4 years.  We will start off with some basic labs.  -     TSH WITH REFLEX TO FT4; Future  -     VITAMIN D,25 HYDROXY; Future  -     VITAMIN B12; Future          Return in about 6 months (around 8/20/2020) for hyperlipidemia.  This note was created using voice recognition software (Dragon). The accuracy of the dictation is limited by the abilities of the software. I have reviewed the note prior to signing, however some errors in grammar and context are still possible. If you have " any questions related to this note please do not hesitate to contact our office.

## 2020-07-12 DIAGNOSIS — E78.2 MIXED HYPERLIPIDEMIA: ICD-10-CM

## 2020-07-14 RX ORDER — COLESEVELAM 180 1/1
TABLET ORAL
Qty: 180 TAB | Refills: 3 | Status: SHIPPED | OUTPATIENT
Start: 2020-07-14 | End: 2021-02-25 | Stop reason: SDUPTHER

## 2020-07-14 NOTE — TELEPHONE ENCOUNTER
Requested Prescriptions     Pending Prescriptions Disp Refills   • colesevelam (WELCHOL) 625 MG Tab [Pharmacy Med Name: COLESEVELAM 625 MG TABLET] 180 Tab 3     Sig: TAKE THREE TABLETS BY MOUTH TWICE A DAY TAKE WITH FOOD   Savannah Cardozo M.D.

## 2020-08-21 ENCOUNTER — HOSPITAL ENCOUNTER (OUTPATIENT)
Dept: LAB | Facility: MEDICAL CENTER | Age: 50
End: 2020-08-21
Attending: FAMILY MEDICINE
Payer: COMMERCIAL

## 2020-08-21 DIAGNOSIS — E78.2 MIXED HYPERLIPIDEMIA: ICD-10-CM

## 2020-08-21 DIAGNOSIS — R41.3 MEMORY LOSS: ICD-10-CM

## 2020-08-21 LAB
25(OH)D3 SERPL-MCNC: 31 NG/ML (ref 30–100)
ALBUMIN SERPL BCP-MCNC: 4.2 G/DL (ref 3.2–4.9)
ALBUMIN/GLOB SERPL: 1.6 G/DL
ALP SERPL-CCNC: 82 U/L (ref 30–99)
ALT SERPL-CCNC: 26 U/L (ref 2–50)
ANION GAP SERPL CALC-SCNC: 9 MMOL/L (ref 7–16)
AST SERPL-CCNC: 18 U/L (ref 12–45)
BASOPHILS # BLD AUTO: 1.8 % (ref 0–1.8)
BASOPHILS # BLD: 0.13 K/UL (ref 0–0.12)
BILIRUB SERPL-MCNC: 0.2 MG/DL (ref 0.1–1.5)
BUN SERPL-MCNC: 24 MG/DL (ref 8–22)
CALCIUM SERPL-MCNC: 9.4 MG/DL (ref 8.5–10.5)
CHLORIDE SERPL-SCNC: 107 MMOL/L (ref 96–112)
CHOLEST SERPL-MCNC: 179 MG/DL (ref 100–199)
CO2 SERPL-SCNC: 23 MMOL/L (ref 20–33)
CREAT SERPL-MCNC: 0.75 MG/DL (ref 0.5–1.4)
EOSINOPHIL # BLD AUTO: 0.21 K/UL (ref 0–0.51)
EOSINOPHIL NFR BLD: 2.9 % (ref 0–6.9)
ERYTHROCYTE [DISTWIDTH] IN BLOOD BY AUTOMATED COUNT: 46.6 FL (ref 35.9–50)
FASTING STATUS PATIENT QL REPORTED: NORMAL
GLOBULIN SER CALC-MCNC: 2.6 G/DL (ref 1.9–3.5)
GLUCOSE SERPL-MCNC: 100 MG/DL (ref 65–99)
HCT VFR BLD AUTO: 41.6 % (ref 37–47)
HDLC SERPL-MCNC: 55 MG/DL
HGB BLD-MCNC: 14 G/DL (ref 12–16)
IMM GRANULOCYTES # BLD AUTO: 0.02 K/UL (ref 0–0.11)
IMM GRANULOCYTES NFR BLD AUTO: 0.3 % (ref 0–0.9)
LDLC SERPL CALC-MCNC: 107 MG/DL
LYMPHOCYTES # BLD AUTO: 3.23 K/UL (ref 1–4.8)
LYMPHOCYTES NFR BLD: 44.4 % (ref 22–41)
MCH RBC QN AUTO: 31.3 PG (ref 27–33)
MCHC RBC AUTO-ENTMCNC: 33.7 G/DL (ref 33.6–35)
MCV RBC AUTO: 93.1 FL (ref 81.4–97.8)
MONOCYTES # BLD AUTO: 0.49 K/UL (ref 0–0.85)
MONOCYTES NFR BLD AUTO: 6.7 % (ref 0–13.4)
NEUTROPHILS # BLD AUTO: 3.19 K/UL (ref 2–7.15)
NEUTROPHILS NFR BLD: 43.9 % (ref 44–72)
NRBC # BLD AUTO: 0 K/UL
NRBC BLD-RTO: 0 /100 WBC
PLATELET # BLD AUTO: 253 K/UL (ref 164–446)
PMV BLD AUTO: 11.5 FL (ref 9–12.9)
POTASSIUM SERPL-SCNC: 4.4 MMOL/L (ref 3.6–5.5)
PROT SERPL-MCNC: 6.8 G/DL (ref 6–8.2)
RBC # BLD AUTO: 4.47 M/UL (ref 4.2–5.4)
SODIUM SERPL-SCNC: 139 MMOL/L (ref 135–145)
TRIGL SERPL-MCNC: 84 MG/DL (ref 0–149)
TSH SERPL DL<=0.005 MIU/L-ACNC: 1.2 UIU/ML (ref 0.38–5.33)
VIT B12 SERPL-MCNC: 490 PG/ML (ref 211–911)
WBC # BLD AUTO: 7.3 K/UL (ref 4.8–10.8)

## 2020-08-21 PROCEDURE — 82607 VITAMIN B-12: CPT

## 2020-08-21 PROCEDURE — 80053 COMPREHEN METABOLIC PANEL: CPT

## 2020-08-21 PROCEDURE — 36415 COLL VENOUS BLD VENIPUNCTURE: CPT

## 2020-08-21 PROCEDURE — 85025 COMPLETE CBC W/AUTO DIFF WBC: CPT

## 2020-08-21 PROCEDURE — 82306 VITAMIN D 25 HYDROXY: CPT

## 2020-08-21 PROCEDURE — 80061 LIPID PANEL: CPT

## 2020-08-21 PROCEDURE — 84443 ASSAY THYROID STIM HORMONE: CPT

## 2020-08-25 ENCOUNTER — OFFICE VISIT (OUTPATIENT)
Dept: MEDICAL GROUP | Facility: PHYSICIAN GROUP | Age: 50
End: 2020-08-25
Payer: COMMERCIAL

## 2020-08-25 VITALS
DIASTOLIC BLOOD PRESSURE: 74 MMHG | WEIGHT: 187 LBS | OXYGEN SATURATION: 95 % | HEART RATE: 70 BPM | RESPIRATION RATE: 20 BRPM | SYSTOLIC BLOOD PRESSURE: 118 MMHG | BODY MASS INDEX: 31.16 KG/M2 | TEMPERATURE: 97.9 F | HEIGHT: 65 IN

## 2020-08-25 DIAGNOSIS — Z12.12 SCREENING FOR COLORECTAL CANCER: ICD-10-CM

## 2020-08-25 DIAGNOSIS — R73.01 ELEVATED FASTING GLUCOSE: ICD-10-CM

## 2020-08-25 DIAGNOSIS — Z12.31 ENCOUNTER FOR SCREENING MAMMOGRAM FOR BREAST CANCER: ICD-10-CM

## 2020-08-25 DIAGNOSIS — Z12.11 SCREENING FOR COLORECTAL CANCER: ICD-10-CM

## 2020-08-25 DIAGNOSIS — E78.2 MIXED HYPERLIPIDEMIA: ICD-10-CM

## 2020-08-25 DIAGNOSIS — F41.9 ANXIETY AND DEPRESSION: ICD-10-CM

## 2020-08-25 DIAGNOSIS — F32.A ANXIETY AND DEPRESSION: ICD-10-CM

## 2020-08-25 PROCEDURE — 99214 OFFICE O/P EST MOD 30 MIN: CPT | Performed by: FAMILY MEDICINE

## 2020-08-25 ASSESSMENT — FIBROSIS 4 INDEX: FIB4 SCORE: 0.73

## 2020-08-25 NOTE — LETTER
[facility]  Savannah Cardozo M.D.  [address] [facility] [address]  Fax: [phone]   Authorization for Release/Disclosure of   Protected Health Information   Name: ANY DOUGLAS : 1970 SSN: [id number]   Address: [address] [place] [address] Phone:    [phone] (home)    I authorize the entity listed below to release/disclose the PHI below to:   [facility]/Savannah Cardozo M.D. and Savannah Cardozo M.D.   Provider or Entity Name:  [facility] 2020 pap results   Address   City, State, Zip   Phone:      Fax:     Reason for request: continuity of care   Information to be released:    [  ] LAST COLONOSCOPY,  including any PATH REPORT and follow-up  [  ] LAST FIT/COLOGUARD RESULT [  ] LAST DEXA  [  ] LAST MAMMOGRAM  [ y ] LAST PAP  [  ] LAST LABS [  ] RETINA EXAM REPORT  [  ] IMMUNIZATION RECORDS  [  ] Release all info      [  ] Check here and initial the line next to each item to release ALL health information INCLUDING  _____ Care and treatment for drug and / or alcohol abuse  _____ HIV testing, infection status, or AIDS  _____ Genetic Testing    DATES OF SERVICE OR TIME PERIOD TO BE DISCLOSED: _____________  I understand and acknowledge that:  * This Authorization may be revoked at any time by you in writing, except if your health information has already been used or disclosed.  * Your health information that will be used or disclosed as a result of you signing this authorization could be re-disclosed by the recipient. If this occurs, your re-disclosed health information may no longer be protected by State or Federal laws.  * You may refuse to sign this Authorization. Your refusal will not affect your ability to obtain treatment.  * This Authorization becomes effective upon signing and will  on (date) __________.      If no date is indicated, this Authorization will  one (1) year from the signature date.    Name: Any Douglas    Signature:   Date:      8/25/2020       PLEASE FAX REQUESTED RECORDS BACK TO: [phone]

## 2020-08-25 NOTE — PROGRESS NOTES
CC: follow up                                                                                                                                  Any presents today for f/u lab results and 6 month follow up.  She states that she did have her GYN appointment in March and had a Pap done at that time with Dr. Kilpatrick.     Mixed hyperlipidemia  Chronic.  Recent labs show improvement.  Continues to take WelChol 625 mg 3 tablets twice a day.  Recent labs show stable cholesterol at 179, improved triglycerides 84, HDL 55, and LDL at 107.  Denies any headaches or chest pain.    Anxiety and depression  Chronic.  Has been on Celexa 20 mg for many years.  States that currently she is only been taking it for anxiety.  In the past she used to have depression.    Elevated fasting glucose  New medical problem.  Recent labs have increased fasting glucose at 100.  Previously they were in the 80s and 90s.  BMI is 31.12.  She states that the only form of exercise she gets is when she walks her dog.  States that her parents are not diabetic but she does have a strong family history of diabetes in their siblings.      Patient Active Problem List    Diagnosis Date Noted   • Memory loss 02/20/2020   • Chronic pain of both shoulders 04/10/2019   • Left arm pain 06/14/2017   • Arthralgia 06/10/2016   • Mixed hyperlipidemia 10/23/2015   • Hormone replacement therapy (HRT) 10/20/2015   • Anxiety and depression 10/15/2015       Current Outpatient Medications   Medication Sig Dispense Refill   • colesevelam (WELCHOL) 625 MG Tab TAKE THREE TABLETS BY MOUTH TWICE A DAY TAKE WITH FOOD 180 Tab 3   • citalopram (CELEXA) 20 MG Tab Take 1 Tab by mouth every day. 90 Tab 2   • valACYclovir (VALTREX) 500 MG Tab Take 500 mg by mouth 2 times a day.     • estradiol (ESTRACE) 2 MG Tab Take 1 mg by mouth every day.       No current facility-administered medications for this visit.          Allergies as of 08/25/2020 - Reviewed 08/25/2020   Allergen Reaction  "Noted   • Minocycline Hives 06/20/2014          /74 (BP Location: Left arm, Patient Position: Sitting, BP Cuff Size: Adult)   Pulse 70   Temp 36.6 °C (97.9 °F) (Temporal)   Resp 20   Ht 1.651 m (5' 5\")   Wt 84.8 kg (187 lb)   LMP 05/01/2014   SpO2 95%   BMI 31.12 kg/m²     Physical Exam:  Gen:         Alert and oriented, No apparent distress.  Neck:        supple, No Lymphadenopathy  Lungs:     Clear to auscultation bilaterally  CV:          Regular rate and rhythm. no LE edema             Ext:          No clubbing, cyanosis      Assessment and Plan.   50 y.o. female with the following issues.    Any was seen today for follow-up.    Diagnoses and all orders for this visit:    Mixed hyperlipidemia  Chronic medical diagnosis.  Stable.  Continue with WelChol.  -     Basic Metabolic Panel; Future  -     Lipid Profile; Future    Encounter for screening mammogram for breast cancer  Consent signed.  Will obtain Pap results.  -     MA-SCREENING MAMMO BILAT W/TOMOSYNTHESIS W/CAD; Future    Screening for colorectal cancer  -     REFERRAL TO GI FOR COLONOSCOPY    Anxiety and depression  Chronic.  Stable.  Continue with Celexa.    Elevated fasting glucose  New.  Diet lifestyle modifications discussed and encouraged.      Follow up: 6 months.    "

## 2020-10-04 DIAGNOSIS — F41.9 ANXIETY AND DEPRESSION: ICD-10-CM

## 2020-10-04 DIAGNOSIS — F32.A ANXIETY AND DEPRESSION: ICD-10-CM

## 2020-10-05 RX ORDER — CITALOPRAM 20 MG/1
TABLET ORAL
Qty: 90 TAB | Refills: 0 | Status: SHIPPED | OUTPATIENT
Start: 2020-10-05 | End: 2021-02-25 | Stop reason: SDUPTHER

## 2020-10-05 NOTE — TELEPHONE ENCOUNTER
Received request via: Pharmacy    Was the patient seen in the last year in this department? Yes 8/25/20    Does the patient have an active prescription (recently filled or refills available) for medication(s) requested? No

## 2020-10-05 NOTE — TELEPHONE ENCOUNTER
Requested Prescriptions     Signed Prescriptions Disp Refills   • citalopram (CELEXA) 20 MG Tab 90 Tab 0     Sig: TAKE ONE TABLET BY MOUTH DAILY     Authorizing Provider: SALENA ATKINSON A.P.R.N.

## 2021-02-25 ENCOUNTER — TELEMEDICINE (OUTPATIENT)
Dept: MEDICAL GROUP | Facility: PHYSICIAN GROUP | Age: 51
End: 2021-02-25
Payer: COMMERCIAL

## 2021-02-25 VITALS — RESPIRATION RATE: 16 BRPM | BODY MASS INDEX: 30.82 KG/M2 | WEIGHT: 185 LBS | HEIGHT: 65 IN

## 2021-02-25 DIAGNOSIS — N39.3 STRESS INCONTINENCE: ICD-10-CM

## 2021-02-25 DIAGNOSIS — Z12.4 ENCOUNTER FOR PAPANICOLAOU SMEAR OF CERVIX: ICD-10-CM

## 2021-02-25 DIAGNOSIS — L65.9 THINNING HAIR: ICD-10-CM

## 2021-02-25 DIAGNOSIS — Z12.31 SCREENING MAMMOGRAM, ENCOUNTER FOR: ICD-10-CM

## 2021-02-25 DIAGNOSIS — F41.9 ANXIETY AND DEPRESSION: ICD-10-CM

## 2021-02-25 DIAGNOSIS — E78.2 MIXED HYPERLIPIDEMIA: ICD-10-CM

## 2021-02-25 DIAGNOSIS — Z79.890 HORMONE REPLACEMENT THERAPY (HRT): ICD-10-CM

## 2021-02-25 DIAGNOSIS — K21.9 GASTROESOPHAGEAL REFLUX DISEASE WITHOUT ESOPHAGITIS: ICD-10-CM

## 2021-02-25 DIAGNOSIS — R73.01 ELEVATED FASTING GLUCOSE: ICD-10-CM

## 2021-02-25 DIAGNOSIS — F32.A ANXIETY AND DEPRESSION: ICD-10-CM

## 2021-02-25 DIAGNOSIS — R41.3 MEMORY LOSS: ICD-10-CM

## 2021-02-25 PROBLEM — B00.1 COLD SORE: Status: ACTIVE | Noted: 2021-02-25

## 2021-02-25 PROCEDURE — 99204 OFFICE O/P NEW MOD 45 MIN: CPT | Mod: 95,CR | Performed by: STUDENT IN AN ORGANIZED HEALTH CARE EDUCATION/TRAINING PROGRAM

## 2021-02-25 RX ORDER — CITALOPRAM 20 MG/1
20 TABLET ORAL DAILY
Qty: 90 TABLET | Refills: 3 | Status: SHIPPED | OUTPATIENT
Start: 2021-02-25 | End: 2022-03-14

## 2021-02-25 RX ORDER — COLESEVELAM 180 1/1
1875 TABLET ORAL 2 TIMES DAILY WITH MEALS
Qty: 180 TABLET | Refills: 5 | Status: SHIPPED | OUTPATIENT
Start: 2021-02-25 | End: 2022-03-14

## 2021-02-25 ASSESSMENT — FIBROSIS 4 INDEX: FIB4 SCORE: 0.73

## 2021-02-25 NOTE — ASSESSMENT & PLAN NOTE
Prevoiusly had been on HRT, but recently stopped.   No longer taking estradiol, per patient's choice.   Has noted occasional hot-flashes after finishing taper, a few months ago.

## 2021-02-25 NOTE — ASSESSMENT & PLAN NOTE
Mild elevation of glucose - 100, on prior lab.  Last 2 months trying to reduce sugars and eat healthier.    Prior PCP had ordered new BMP, prior to our next visit, she will obtain them before then.

## 2021-02-25 NOTE — PROGRESS NOTES
Telemedicine Video Visit:   NEW  Patient   This Remote Face to Face encounter was conducted via Zoom. Given the importance of social distancing and other strategies recommended to reduce the risk of COVID-19 transmission, I am providing medical care to this patient via audio/video visit in place of an in person visit at the request of the patient. Verbal consent to telehealth, risks, benefits, and consequences were discussed. Patient retains the right to withdraw at any time. All existing confidentiality protections apply. The patient has access to all transmitted medical information. No dissemination of any patient images or information to other entities without further written consent.  Subjective:     Chief Complaint   Patient presents with   • Medication Refill     colesevelam (WELCHOL) 625 MG Tab, citalopram (CELEXA) 20 MG Tab,        Anyandreina Schwarz is a 50 y.o. female.     Mixed hyperlipidemia  Patient has ongoing issue with hyperlipidemia.  She has previously had leg pains, from statins.  Therefore, she is currently using WelChol (1875 mg, twice daily).  She notes good compliance with this, and no side effects.  LDL mildly elevated at 110, but she has a fairly good general: LDL ratio.     Anxiety and depression  Mild anxiety and depressoin.   Has been on celexa 20, since 4/2019.  Feels fairly well controlled,  but a lot of stress over past couple years.   She wants to stay on it, for now.     Hormone replacement therapy (HRT)  Prevoiusly had been on HRT, but recently stopped.   No longer taking estradiol, per patient's choice.   Has noted occasional hot-flashes after finishing taper, a few months ago.     Elevated fasting glucose  Mild elevation of glucose - 100, on prior lab.  Last 2 months trying to reduce sugars and eat healthier.    Prior PCP had ordered new BMP, prior to our next visit, she will obtain them before then.    Memory loss  Patient notes occasional difficulties coming up with right  names and words, and some difficulty concentrating.  She is concerned about this, because she has had a couple of family members (grandparents/aunts), who has had either dementia or Alzheimer's.  Has recently had normal TSH level.  Does have some anxiety and depression, but feels it is fairly under control.  Can follow-up further on next visit.    Thinning hair  Patient has noted thinning hair, for past 15 years.  She has had a few thyroid test done in the past, that have been in normal ranges.  She has not had any recent changes, and immediate past.  Can follow-up on next visit.     Gastroesophageal reflux disease without esophagitis  Patient only notes occasional episodes of reflux or heartburn.  She notes occasionally using Tums or Marva-[place], when needed.  She notes only happens about once a week.  She is not interested in further medication management at this time.     Stress incontinence  Patient notes a history of stress incontinence, that has occasional leaking, when she sneezes or exercises occasionally.  She states she has not had this worked up before.  She does have a history of JOANN.  She has not tried Kegel exercises before.      Review of Symptoms  GEN/CONST:   Denies fever, fatigue, weakness,       + occasional hot-flashes.   H/E:     Denies blurry vision or eye pain.  ENT:   Denies sinus pain, sore throat, ear pain, difficulty hearing,  CARDIO:   Denies chest pain, palpitations,  or edema.  RESP:   Denies shortness of breath, wheezing, or coughing.  GI:    Denies nausea, vomiting, diarrhea,  or abdominal pain.   :   Denies dysuria, hematuria, hesitancy, or urgency.     + stress incontinence with sneezing.   HEME:  Denies easy bruising, bleeding,  MSK:  + shoulder pains (chornic)   NEURO:  Denies numbness or  focal weakness.    ENDO:  Denies heat or cold intolerance,   PSYCH:  + see HPI.       Past Medical History:   Diagnosis Date   • Anesthesia     PONV   • Anxiety    • Arthritis     elbows and  "hands   • Cancer (HCC) 1990    melanoma left leg, when 20 yo   • Depression    • Heart burn    • High cholesterol    • Other specified disorder of intestines     IBS   • Other specified symptom associated with female genital organs     heavy bleeding   • Ovarian cyst    • Unspecified urinary incontinence        Past Surgical History:   Procedure Laterality Date   • ABDOMINAL HYSTERECTOMY TOTAL  6/24/2014    Performed by Hernandez Kilpatrick M.D. at SURGERY SAME DAY [facility]   • HYSTEROSCOPY WITH VIDEO OPERATIVE  11/25/2013    Performed by Hernandez Kilpatrick M.D. at [facility]        Family History   Problem Relation Age of Onset   • Hypertension Mother    • Cancer Father         prostate   • No Known Problems Sister    • No Known Problems Brother    • Diabetes Maternal Aunt    • Alzheimer's Disease Maternal Aunt    • Diabetes Paternal Uncle    • Diabetes Maternal Grandmother    • Diabetes Paternal Grandfather    • Alzheimer's Disease Paternal Grandmother          Allergies   Allergen Reactions   • Minocycline Hives     Lip swelling       Current medicines (including changes today)  Current Outpatient Medications   Medication Sig Dispense Refill   • citalopram (CELEXA) 20 MG Tab Take 1 tablet by mouth every day. 90 tablet 3   • colesevelam (WELCHOL) 625 MG Tab Take 3 Tablets by mouth 2 times a day, with meals. 180 tablet 5   • valACYclovir (VALTREX) 500 MG Tab Take 500 mg by mouth 2 times a day.       No current facility-administered medications for this visit.            Objective:   Vitals obtained by patient:  Resp 16   Ht 1.651 m (5' 5\") Comment: per patient  Wt 83.9 kg (185 lb) Comment: per patient  LMP 05/01/2014   BMI 30.79 kg/m²   Resp.Rate ~ 14 (on observation)     Physical Exam:    Constitutional: Alert, no distress, well-groomed.  Skin: No rashes in visible areas.  Eye: Round. Conjunctiva clear, lids normal. No icterus.   ENMT: Lips pink without lesions,   Phonation normal.  Neck: No " masses, no thyromegaly. Moves freely without pain.  CV: Pulse as reported normal rhythm, by patient  Respiratory: Unlabored respiratory effort, no cough or audible wheeze  Psych: Alert and oriented x3, normal affect and mood.       Labs: Reviewed prior CBC, CMP, TSH, and Lipids.   (Independently reviewed by myself).        Assessment and Plan:   The following treatment plan was discussed:     1. Mixed hyperlipidemia  Previously had poor reactions to statins.  Patient had mild elevation of LDL on prior labs (110), but HDL: LDL ratio still in reasonable range.    Continue on WelChol, for now.  - colesevelam (WELCHOL) 625 MG Tab; Take 3 Tablets by mouth 2 times a day, with meals.  Dispense: 180 tablet; Refill: 5    2. Anxiety and depression  Patient notes anxiety and depression, fairly well controlled with Celexa 20.  She notes having some occasional stresses, during the past 2 years (including Covid pandemic), so not interested in decreasing or tapering at this time.  We will continue on Celexa for now.  - On next visit, can investigate this issue further, and see if related to concentration issues.  - citalopram (CELEXA) 20 MG Tab; Take 1 tablet by mouth every day.  Dispense: 90 tablet; Refill: 3    3. Elevated fasting glucose  Previously noted on prior lab.  Prior PCP had ordered new BMP, that the patient has still to obtain.  She will get this, prior to next visit.    4. Memory loss  5. Thinning hair  Patient has had a couple of recent normal thyroid tests.  Additionally, she notes issues with anxiety and depression.    - Can investigate these issues further, on next visit.    6. Gastroesophageal reflux disease without esophagitis  Only occasional episodes.  Uses occasional Tums. -PRN.     7. Stress incontinence  Notes occasional leakage, with coughing, sneezing, or exercise.  Has not tried any Kegel exercises yet.  Will have her begin these, and follow-up in a month.  If no change, given her history of JOANN, may  consider possible urology referral    8. Hormone replacement therapy (HRT)  Finished....   The patient is previously stopped the estradiol, given concerns that she is having for it.  She previously tapered it, and then stopped it, a couple months ago.  She has noted some occasional hot flashes, but otherwise, no severe problems with stopping.  Finished.    9. Encounter for Papanicolaou smear of cervix  Patient notes she gets routine Paps, through gynecologist, but they are closing their office.  She would like new referral to a gynecologist, here in [place].  Order placed.  - REFERRAL TO GYNECOLOGY    10. Screening mammogram, encounter for  Patient due for screening mammogram.  She has previously had some cysts in her breast, that were unchanged on initial follow-up.  We will get new mammogram, for further evaluation.  - MA-SCREENING MAMMO BILAT W/TOMOSYNTHESIS W/CAD; Future      Health Maintenance Review  influ vaccine - end (delay for covid vaccine).   Pneumo Vaccine - n/a  Tetanus - 2019   Shingles - will get in future, at pharmacy, (delay since covid vaccine).   Colonoscopy - prev.ordered - will call to schedule.   Mammogram - ordered.   Pap - 1-2 years ago, sees GYN (outside)      ...  Needs new GYN....   will refer to new one.   Dexa - n/a  Labs < 12 mo / met screen - reviewed      Follow-up:  Prev. Scheduled for 3/30/21.      Face to Face Video Visit:   I spent ~31 minutes with patient/guardian and I conducted this visit with audio and video present.    Parts of this note were created with a computerized dictation system.    Despite review, there may be some spelling or grammatical errors.    Tomas Swanson M.D.  2/25/2021

## 2021-02-25 NOTE — ASSESSMENT & PLAN NOTE
Patient notes occasional difficulties coming up with right names and words, and some difficulty concentrating.  She is concerned about this, because she has had a couple of family members (grandparents/aunts), who has had either dementia or Alzheimer's.  Has recently had normal TSH level.  Does have some anxiety and depression, but feels it is fairly under control.  Can follow-up further on next visit.

## 2021-02-25 NOTE — ASSESSMENT & PLAN NOTE
Patient only notes occasional episodes of reflux or heartburn.  She notes occasionally using Tums or Marva-[place], when needed.  She notes only happens about once a week.  She is not interested in further medication management at this time.

## 2021-02-25 NOTE — ASSESSMENT & PLAN NOTE
Patient has ongoing issue with hyperlipidemia.  She has previously had leg pains, from statins.  Therefore, she is currently using WelChol (1875 mg, twice daily).  She notes good compliance with this, and no side effects.  LDL mildly elevated at 110, but she has a fairly good general: LDL ratio.

## 2021-02-25 NOTE — ASSESSMENT & PLAN NOTE
Mild anxiety and depressoin.   Has been on celexa 20, since 4/2019.  Feels fairly well controlled,  but a lot of stress over past couple years.   She wants to stay on it, for now.

## 2021-02-25 NOTE — ASSESSMENT & PLAN NOTE
Patient has noted thinning hair, for past 15 years.  She has had a few thyroid test done in the past, that have been in normal ranges.  She has not had any recent changes, and immediate past.  Can follow-up on next visit.

## 2021-02-25 NOTE — ASSESSMENT & PLAN NOTE
Patient notes a history of stress incontinence, that has occasional leaking, when she sneezes or exercises occasionally.  She states she has not had this worked up before.  She does have a history of JOANN.  She has not tried Kegel exercises before.

## 2021-03-16 ENCOUNTER — HOSPITAL ENCOUNTER (OUTPATIENT)
Dept: RADIOLOGY | Facility: MEDICAL CENTER | Age: 51
End: 2021-03-16
Attending: FAMILY MEDICINE
Payer: COMMERCIAL

## 2021-03-16 DIAGNOSIS — Z12.31 ENCOUNTER FOR SCREENING MAMMOGRAM FOR BREAST CANCER: ICD-10-CM

## 2021-03-16 PROCEDURE — 77063 BREAST TOMOSYNTHESIS BI: CPT

## 2021-03-30 ENCOUNTER — OFFICE VISIT (OUTPATIENT)
Dept: MEDICAL GROUP | Facility: PHYSICIAN GROUP | Age: 51
End: 2021-03-30
Payer: COMMERCIAL

## 2021-03-30 VITALS
BODY MASS INDEX: 30.53 KG/M2 | SYSTOLIC BLOOD PRESSURE: 100 MMHG | OXYGEN SATURATION: 98 % | WEIGHT: 190 LBS | HEIGHT: 66 IN | HEART RATE: 61 BPM | TEMPERATURE: 98.7 F | DIASTOLIC BLOOD PRESSURE: 62 MMHG

## 2021-03-30 DIAGNOSIS — Z78.9 STATIN INTOLERANCE: ICD-10-CM

## 2021-03-30 DIAGNOSIS — F41.9 ANXIETY AND DEPRESSION: ICD-10-CM

## 2021-03-30 DIAGNOSIS — E78.2 MIXED HYPERLIPIDEMIA: ICD-10-CM

## 2021-03-30 DIAGNOSIS — Z23 NEED FOR VACCINATION: ICD-10-CM

## 2021-03-30 DIAGNOSIS — F32.A ANXIETY AND DEPRESSION: ICD-10-CM

## 2021-03-30 DIAGNOSIS — Z12.11 COLON CANCER SCREENING: ICD-10-CM

## 2021-03-30 PROCEDURE — 90471 IMMUNIZATION ADMIN: CPT | Performed by: STUDENT IN AN ORGANIZED HEALTH CARE EDUCATION/TRAINING PROGRAM

## 2021-03-30 PROCEDURE — 99213 OFFICE O/P EST LOW 20 MIN: CPT | Mod: 25 | Performed by: STUDENT IN AN ORGANIZED HEALTH CARE EDUCATION/TRAINING PROGRAM

## 2021-03-30 PROCEDURE — 90750 HZV VACC RECOMBINANT IM: CPT | Performed by: STUDENT IN AN ORGANIZED HEALTH CARE EDUCATION/TRAINING PROGRAM

## 2021-03-30 ASSESSMENT — FIBROSIS 4 INDEX: FIB4 SCORE: 0.73

## 2021-03-30 NOTE — ASSESSMENT & PLAN NOTE
Patient has ongoing issue with hyperlipidemia.    She has previously had leg pains, from statins.    Therefore, she is currently using WelChol   (1875 mg, twice daily).    She notes good compliance with this, and no side effects.  LDL mildly elevated at 110,   but she has a fairly good general: LDL ratio.   - continue on WelChol.

## 2021-03-30 NOTE — ASSESSMENT & PLAN NOTE
Added for problem list reference.   Previously failed multiple statins.  Had leg pains and aches, with them.  Currently on WelChol.  Currently with moderately good control.  -Avoid statins, for now.

## 2021-03-30 NOTE — ASSESSMENT & PLAN NOTE
Mild anxiety and depressoin.   Has been on celexa 20, since 4/2019.  Feels fairly well controlled,  but a lot of stress over past couple years.   She wants to stay on it, for now.   Been related to social situations.  Better under Covid lockdown's (fewer social issues).  -Continue Celexa for now.

## 2021-03-30 NOTE — PROGRESS NOTES
Established Patient     Any Schwarz is a 50 y.o. female.    Chief Complaint   Patient presents with   • Follow-Up                Problems addressed this visit:     Subjective HPI is included in Assessment & Plan, at bottom of note.   Problem   Statin Intolerance   Mixed Hyperlipidemia   Anxiety and Depression                            Past Medical History:   Diagnosis Date   • Anesthesia     PONV   • Anxiety    • Arthritis     elbows and hands   • Cancer (HCC) 1990    melanoma left leg, when 18 yo   • Depression    • Heart burn    • High cholesterol    • Other specified disorder of intestines     IBS   • Other specified symptom associated with female genital organs     heavy bleeding   • Ovarian cyst    • Unspecified urinary incontinence        Patient Active Problem List   Diagnosis   • Anxiety and depression   • Hormone replacement therapy (HRT)   • Mixed hyperlipidemia   • Arthralgia   • Left arm pain   • Chronic pain of both shoulders   • Memory loss   • Elevated fasting glucose   • Thinning hair   • Gastroesophageal reflux disease without esophagitis   • Stress incontinence   • Cold sore   • Statin intolerance       Past Surgical History:   Procedure Laterality Date   • ABDOMINAL HYSTERECTOMY TOTAL  6/24/2014    Performed by Hernandez Kilpatrick M.D. at SURGERY SAME DAY [facility]   • HYSTEROSCOPY WITH VIDEO OPERATIVE  11/25/2013    Performed by Hernandez Kilpatrick M.D. at SURGERY [facility]       Family History   Problem Relation Age of Onset   • Hypertension Mother    • Cancer Father         prostate   • No Known Problems Sister    • No Known Problems Brother    • Alzheimer's Disease Maternal Aunt    • Diabetes Maternal Grandmother    • Diabetes Paternal Grandfather    • Alzheimer's Disease Paternal Grandmother        Social History     Tobacco Use   • Smoking status: Never Smoker   • Smokeless tobacco: Never Used   Substance Use Topics   • Alcohol use: Yes     Alcohol/week: 0.0 oz      "Comment: 1 beer a day, recently (was less before).        Current Outpatient Medications   Medication Sig Dispense Refill   • citalopram (CELEXA) 20 MG Tab Take 1 tablet by mouth every day. 90 tablet 3   • colesevelam (WELCHOL) 625 MG Tab Take 3 Tablets by mouth 2 times a day, with meals. 180 tablet 5   • valACYclovir (VALTREX) 500 MG Tab Take 500 mg by mouth 2 times a day.       No current facility-administered medications for this visit.       Allergies as of 03/30/2021 - Reviewed 02/25/2021   Allergen Reaction Noted   • Minocycline Hives 06/20/2014   • Statins [hmg-coa-r inhibitors]  03/30/2021         Review of Symptoms   (as noted elsewhere, and .....)   GEN/CONST:   Denies fever, chills, fatigue,      + thin hair, chronic.   CARDIO:   Denies chest pain, palpitations, or edema.    RESP:   Denies shortness of breath, wheezing, or coughing.  GI:    Denies nausea, vomiting, diarrhea,     MSK:  + stiff elbows and hips.   NEURO:  Denies numbness or focal weakness.       PSYCH:  + anx/dep.       Physical Exam  /62 (BP Location: Left arm, Patient Position: Sitting, BP Cuff Size: Adult)   Pulse 61   Temp 37.1 °C (98.7 °F) (Temporal)   Ht 1.676 m (5' 6\")   Wt 86.2 kg (190 lb)   LMP 05/01/2014   SpO2 98%   BMI 30.67 kg/m²   General:  Alert and oriented, No apparent distress.       + thin hair, uniform loss, no stuble.   Lungs: Clear to auscultation bilaterally.  No wheezes or rales.  Cardiovascular: Regular rate and rhythm.  No murmurs, rubs or gallops.  Abdomen:  Soft.  Non-tender.  No rebound or guarding.   Extremities:  No pedal edema.  Good general motion of extremities.      + mild discomfort on right hip, on external rotation.   Psychological: Appears to have normal mood and affect.        Labs:  OLD / Prior labs reviewed.    CBC, CMP, TSH, Vit.D and B12.                             Assessment & Plan  (with subjective history and orders):    Problem List Items Addressed This Visit     Anxiety and " depression     Mild anxiety and depressoin.   Has been on celexa 20, since 4/2019.  Feels fairly well controlled,  but a lot of stress over past couple years.   She wants to stay on it, for now.   Been related to social situations.  Better under Covid lockdown's (fewer social issues).  -Continue Celexa for now.         Mixed hyperlipidemia     Patient has ongoing issue with hyperlipidemia.    She has previously had leg pains, from statins.    Therefore, she is currently using WelChol   (1875 mg, twice daily).    She notes good compliance with this, and no side effects.  LDL mildly elevated at 110,   but she has a fairly good general: LDL ratio.   - continue on WelChol.          Statin intolerance     Added for problem list reference.   Previously failed multiple statins.  Had leg pains and aches, with them.  Currently on WelChol.  Currently with moderately good control.  -Avoid statins, for now.           Other Visit Diagnoses     Colon cancer screening        Relevant Orders    REFERRAL TO GI FOR COLONOSCOPY    Need for vaccination        Relevant Orders    Shingles Vaccine (Shingrix) (Completed)                    Diagnosis Table, with orders:  1. Anxiety and depression     2. Mixed hyperlipidemia     3. Statin intolerance     4. Colon cancer screening  REFERRAL TO GI FOR COLONOSCOPY   5. Need for vaccination  Shingles Vaccine (Shingrix)                 Follow-up:  3-month - Shingrix-vaccination, then as needed.  Patient will also follow-up on prior GYN referral.                 A computerized dictation system may have been used in this note.    Despite review, there may be some errors in spelling or grammar.    Tomsa Swanson M.D.  3/30/2021

## 2021-04-23 ENCOUNTER — GYNECOLOGY VISIT (OUTPATIENT)
Dept: OBGYN | Facility: CLINIC | Age: 51
End: 2021-04-23
Payer: COMMERCIAL

## 2021-04-23 ENCOUNTER — HOSPITAL ENCOUNTER (OUTPATIENT)
Facility: MEDICAL CENTER | Age: 51
End: 2021-04-23
Attending: OBSTETRICS & GYNECOLOGY
Payer: COMMERCIAL

## 2021-04-23 VITALS — WEIGHT: 191 LBS | BODY MASS INDEX: 30.83 KG/M2

## 2021-04-23 DIAGNOSIS — Z00.00 ANNUAL PHYSICAL EXAM: ICD-10-CM

## 2021-04-23 PROCEDURE — 88175 CYTOPATH C/V AUTO FLUID REDO: CPT

## 2021-04-23 PROCEDURE — 87624 HPV HI-RISK TYP POOLED RSLT: CPT

## 2021-04-23 PROCEDURE — 99386 PREV VISIT NEW AGE 40-64: CPT | Performed by: OBSTETRICS & GYNECOLOGY

## 2021-04-23 ASSESSMENT — FIBROSIS 4 INDEX: FIB4 SCORE: 0.73

## 2021-04-23 NOTE — PROGRESS NOTES
Chief complaint: Annual exam    Any Schwarz is a 50 y.o.  female who presents for her Annual Gynecologic Exam      HPI Comments: Pt presents for her annual well woman exam.   Pt has no complaints.  Patient's last menstrual period was 2014.     Patient underwent a supracervical hysterectomy in .    Mammogram UTD      Review of Systems:   Pertinent positives documented in HPI and all other systems reviewed & are negative    PGYN Hx: History of endometriosis    All PMH, PSH, allergies, social history and FH reviewed and updated today:  Past Medical History:   Diagnosis Date   • Anesthesia     PONV   • Anxiety    • Arthritis     elbows and hands   • Cancer (HCC)     melanoma left leg, when 18 yo   • Depression    • Heart burn    • High cholesterol    • Other specified disorder of intestines     IBS   • Other specified symptom associated with female genital organs     heavy bleeding   • Ovarian cyst    • Unspecified urinary incontinence      Past Surgical History:   Procedure Laterality Date   • ABDOMINAL HYSTERECTOMY TOTAL  2014    Performed by Hernandez Kilpatrick M.D. at [facility]   • HYSTEROSCOPY WITH VIDEO OPERATIVE  2013    Performed by Hernandez Kilpatrick M.D. at SURGERY [facility] ORS     Medications:   Current Outpatient Medications Ordered in Epic   Medication Sig Dispense Refill   • citalopram (CELEXA) 20 MG Tab Take 1 tablet by mouth every day. 90 tablet 3   • colesevelam (WELCHOL) 625 MG Tab Take 3 Tablets by mouth 2 times a day, with meals. 180 tablet 5   • valACYclovir (VALTREX) 500 MG Tab Take 500 mg by mouth 2 times a day.       No current [facility]-ordered facility-administered medications on file.     Minocycline and Statins [hmg-coa-r inhibitors]  Social History     Socioeconomic History   • Marital status: Single     Spouse name: Not on file   • Number of children: Not on file   • Years of education: Not on file   • Highest education level: Not  on file   Occupational History   • Not on file   Tobacco Use   • Smoking status: Never Smoker   • Smokeless tobacco: Never Used   Substance and Sexual Activity   • Alcohol use: Yes     Alcohol/week: 0.0 oz     Comment: 1 beer a day, recently (was less before).    • Drug use: No     Comment: No, but uses CBD oil on shoulder.    • Sexual activity: Not Currently     Comment: [facility]    Other Topics Concern   • Not on file   Social History Narrative   • Not on file     Social Determinants of Health     Financial Resource Strain:    • Difficulty of Paying Living Expenses:    Food Insecurity:    • Worried About Running Out of Food in the Last Year:    • Ran Out of Food in the Last Year:    Transportation Needs:    • Lack of Transportation (Medical):    • Lack of Transportation (Non-Medical):    Physical Activity:    • Days of Exercise per Week:    • Minutes of Exercise per Session:    Stress:    • Feeling of Stress :    Social Connections:    • Frequency of Communication with Friends and Family:    • Frequency of Social Gatherings with Friends and Family:    • Attends [employer] Services:    • Active Member of Clubs or Organizations:    • Attends Club or Organization Meetings:    • Marital Status:    Intimate Partner Violence:    • Fear of Current or Ex-Partner:    • Emotionally Abused:    • Physically Abused:    • Sexually Abused:      Family History   Problem Relation Age of Onset   • Hypertension Mother    • Cancer Father         prostate   • No Known Problems Sister    • No Known Problems Brother    • Alzheimer's Disease Maternal Aunt    • Diabetes Maternal Grandmother    • Diabetes Paternal Grandfather    • Alzheimer's Disease Paternal Grandmother           Objective:   Vital measurements:  Wt 86.6 kg (191 lb)   Body mass index is 30.83 kg/m². (Goal BM I>18 <25)    Physical Exam   Nursing note and vitals reviewed.  Constitutional: She is oriented to person, place, and time. She appears well-developed and  well-nourished. No distress.     HEENT:   Head: Normocephalic and atraumatic.   Right Ear: External ear normal.   Left Ear: External ear normal.   Nose: Nose normal.   Eyes: Conjunctivae and EOM are normal. Pupils are equal, round, and reactive to light. No scleral icterus.     Neck: Normal range of motion. Neck supple. No tracheal deviation present. No thyromegaly present. No cervical or supraclavicular lymphadenopathy.    Breast:  Symmetrical, normal consistency without masses., No dimpling or skin changes, Normal nipples without discharge, no axillary lymphadenopathy    Abdominal: Soft. Bowel sounds are normal. She exhibits no distension and no mass. No tenderness. She has no rebound and no guarding.     Genitourinary:  Pelvic exam was performed with patient supine.  External genitalia with no abnormal pigmentation, labial fusion, rash, tenderness, lesion or injury to the labia bilaterally.  BUS normal  Vagina is pink and moist with no lesions, foul discharge, erythema, tenderness or bleeding. No foreign body around the vagina or signs of injury.   Cervix exhibits no motion tenderness, no discharge and no friability, no lesions.   Uterus is surgically absent   Right adnexa displays no mass, no tenderness and no fullness.  Left adnexa displays no mass, no tenderness and no fullness.     Musculoskeletal: Normal range of motion. Non tender. She exhibits no edema and no tenderness.     Lymphadenopathy: She has no cervical or supraclavicular adenopathy.     Neurological: She is alert and oriented to person, place, and time. She exhibits normal muscle tone.     Skin: Skin is warm and dry. No rash noted. She is not diaphoretic. No erythema. No pallor.     Psychiatric: She has a normal mood and affect. Her behavior is normal. Judgment and thought content normal.        Assessment:     1. Annual physical exam           Plan:   Pap and physical exam performed.  HPV testing also performed.  Discussed with patient the fact  that she had a supracervical hysterectomy and will need to continue Pap smear screening  Self breast awareness discussed.  Counseling: breast self exam  Encouraged exercise and proper diet.  Mammograms annually. Patient given order for screening kee mammogram.  See medications and orders placed in encounter report.  Weight bearing exercise daily to strengthen bones  Calcium 1200mg daily and 800 IU daily    All questions answered

## 2021-04-23 NOTE — NON-PROVIDER
Pt here for new pt appt  Pt states here for check up/ establish care   Pharmacy verified  Good #:    PAP: 1 yr ago, hx of hysterectomy   MAMMO: 03/2021 Benign

## 2021-04-26 LAB
CYTOLOGY REG CYTOL: NORMAL
HPV HR 12 DNA CVX QL NAA+PROBE: NEGATIVE
HPV16 DNA SPEC QL NAA+PROBE: NEGATIVE
HPV18 DNA SPEC QL NAA+PROBE: NEGATIVE
SPECIMEN SOURCE: NORMAL

## 2021-06-30 ENCOUNTER — NON-PROVIDER VISIT (OUTPATIENT)
Dept: MEDICAL GROUP | Facility: PHYSICIAN GROUP | Age: 51
End: 2021-06-30
Payer: COMMERCIAL

## 2021-06-30 DIAGNOSIS — Z23 NEED FOR VACCINATION: ICD-10-CM

## 2021-06-30 PROCEDURE — 90750 HZV VACC RECOMBINANT IM: CPT | Performed by: STUDENT IN AN ORGANIZED HEALTH CARE EDUCATION/TRAINING PROGRAM

## 2021-06-30 PROCEDURE — 90471 IMMUNIZATION ADMIN: CPT | Performed by: STUDENT IN AN ORGANIZED HEALTH CARE EDUCATION/TRAINING PROGRAM

## 2021-06-30 NOTE — NON-PROVIDER
"stacy Schwarz is a 51 y.o. female here for a non-provider visit for:   SHINGRIX (Shingles)    Reason for immunization: continue or complete series started at the office  Immunization records indicate need for vaccine: Yes, confirmed with Epic and confirmed with [place] [facility]  Minimum interval has been met for this vaccine: Yes  ABN completed: Not Indicated    VIS Dated  1031/2019 was given to patient: Yes  All IAC Questionnaire questions were answered \"No.\"    Patient tolerated injection and no adverse effects were observed or reported: Yes    Pt scheduled for next dose in series: Not Indicated    "

## 2021-07-29 ENCOUNTER — APPOINTMENT (RX ONLY)
Dept: URBAN - METROPOLITAN AREA CLINIC 22 | Facility: CLINIC | Age: 51
Setting detail: DERMATOLOGY
End: 2021-07-29

## 2021-07-29 DIAGNOSIS — Z85.820 PERSONAL HISTORY OF MALIGNANT MELANOMA OF SKIN: ICD-10-CM

## 2021-07-29 DIAGNOSIS — L81.4 OTHER MELANIN HYPERPIGMENTATION: ICD-10-CM

## 2021-07-29 DIAGNOSIS — D18.0 HEMANGIOMA: ICD-10-CM

## 2021-07-29 DIAGNOSIS — Z71.89 OTHER SPECIFIED COUNSELING: ICD-10-CM

## 2021-07-29 DIAGNOSIS — D22 MELANOCYTIC NEVI: ICD-10-CM | Status: STABLE

## 2021-07-29 DIAGNOSIS — L82.1 OTHER SEBORRHEIC KERATOSIS: ICD-10-CM

## 2021-07-29 PROBLEM — L64.9 ANDROGENIC ALOPECIA, UNSPECIFIED: Status: ACTIVE | Noted: 2021-07-29

## 2021-07-29 PROBLEM — D22.5 MELANOCYTIC NEVI OF TRUNK: Status: ACTIVE | Noted: 2021-07-29

## 2021-07-29 PROBLEM — D22.72 MELANOCYTIC NEVI OF LEFT LOWER LIMB, INCLUDING HIP: Status: ACTIVE | Noted: 2021-07-29

## 2021-07-29 PROBLEM — D22.21 MELANOCYTIC NEVI OF RIGHT EAR AND EXTERNAL AURICULAR CANAL: Status: ACTIVE | Noted: 2021-07-29

## 2021-07-29 PROBLEM — D22.62 MELANOCYTIC NEVI OF LEFT UPPER LIMB, INCLUDING SHOULDER: Status: ACTIVE | Noted: 2021-07-29

## 2021-07-29 PROBLEM — D22.71 MELANOCYTIC NEVI OF RIGHT LOWER LIMB, INCLUDING HIP: Status: ACTIVE | Noted: 2021-07-29

## 2021-07-29 PROBLEM — D22.61 MELANOCYTIC NEVI OF RIGHT UPPER LIMB, INCLUDING SHOULDER: Status: ACTIVE | Noted: 2021-07-29

## 2021-07-29 PROBLEM — D18.01 HEMANGIOMA OF SKIN AND SUBCUTANEOUS TISSUE: Status: ACTIVE | Noted: 2021-07-29

## 2021-07-29 PROCEDURE — ? PRESCRIPTION

## 2021-07-29 PROCEDURE — 99213 OFFICE O/P EST LOW 20 MIN: CPT

## 2021-07-29 PROCEDURE — ? SUNSCREEN RECOMMENDATIONS

## 2021-07-29 PROCEDURE — ? COUNSELING

## 2021-07-29 RX ORDER — MINOXIDIL/FINASTERIDE 7 %-0.1 %
SOLUTION, NON-ORAL TOPICAL QD
Qty: 1 | Refills: 11 | Status: ERX | COMMUNITY
Start: 2021-07-29

## 2021-07-29 RX ADMIN — Medication 1: at 00:00

## 2021-07-29 ASSESSMENT — LOCATION DETAILED DESCRIPTION DERM
LOCATION DETAILED: RIGHT DISTAL POSTERIOR UPPER ARM
LOCATION DETAILED: LEFT CENTRAL MALAR CHEEK
LOCATION DETAILED: LEFT INFERIOR MEDIAL UPPER BACK
LOCATION DETAILED: RIGHT ANTERIOR PROXIMAL THIGH
LOCATION DETAILED: LEFT PROXIMAL POSTERIOR UPPER ARM
LOCATION DETAILED: PERIUMBILICAL SKIN
LOCATION DETAILED: RIGHT PROXIMAL DORSAL FOREARM
LOCATION DETAILED: RIGHT SUPERIOR HELIX
LOCATION DETAILED: LEFT INFERIOR ANTERIOR NECK
LOCATION DETAILED: RIGHT RADIAL DORSAL HAND
LOCATION DETAILED: SUPERIOR THORACIC SPINE
LOCATION DETAILED: LEFT PROXIMAL DORSAL FOREARM
LOCATION DETAILED: LEFT ULNAR DORSAL HAND
LOCATION DETAILED: LEFT SUPERIOR MEDIAL UPPER BACK
LOCATION DETAILED: LEFT DISTAL PRETIBIAL REGION
LOCATION DETAILED: LEFT ANTERIOR PROXIMAL THIGH
LOCATION DETAILED: RIGHT CENTRAL MALAR CHEEK

## 2021-07-29 ASSESSMENT — LOCATION SIMPLE DESCRIPTION DERM
LOCATION SIMPLE: RIGHT FOREARM
LOCATION SIMPLE: RIGHT HAND
LOCATION SIMPLE: LEFT PRETIBIAL REGION
LOCATION SIMPLE: LEFT HAND
LOCATION SIMPLE: RIGHT EAR
LOCATION SIMPLE: ABDOMEN
LOCATION SIMPLE: LEFT POSTERIOR UPPER ARM
LOCATION SIMPLE: RIGHT POSTERIOR UPPER ARM
LOCATION SIMPLE: LEFT FOREARM
LOCATION SIMPLE: RIGHT THIGH
LOCATION SIMPLE: LEFT ANTERIOR NECK
LOCATION SIMPLE: LEFT CHEEK
LOCATION SIMPLE: LEFT THIGH
LOCATION SIMPLE: LEFT UPPER BACK
LOCATION SIMPLE: UPPER BACK
LOCATION SIMPLE: RIGHT CHEEK

## 2021-07-29 ASSESSMENT — LOCATION ZONE DERM
LOCATION ZONE: EAR
LOCATION ZONE: ARM
LOCATION ZONE: HAND
LOCATION ZONE: TRUNK
LOCATION ZONE: FACE
LOCATION ZONE: LEG
LOCATION ZONE: NECK

## 2021-07-29 NOTE — PROCEDURE: MIPS QUALITY
Quality 137: Melanoma: Continuity Of Care - Recall System: Patient information entered into a recall system that includes: target date for the next exam specified AND a process to follow up with patients regarding missed or unscheduled appointments
Quality 110: Preventive Care And Screening: Influenza Immunization: Influenza Immunization Administered during Influenza season
Detail Level: Detailed
Quality 226: Preventive Care And Screening: Tobacco Use: Screening And Cessation Intervention: Patient screened for tobacco use and is an ex/non-smoker
Quality 111:Pneumonia Vaccination Status For Older Adults: Pneumococcal Vaccination not Administered or Previously Received, Reason not Otherwise Specified

## 2021-11-01 ENCOUNTER — HOSPITAL ENCOUNTER (OUTPATIENT)
Facility: MEDICAL CENTER | Age: 51
End: 2021-11-01
Attending: FAMILY MEDICINE
Payer: COMMERCIAL

## 2021-11-01 ENCOUNTER — OFFICE VISIT (OUTPATIENT)
Dept: URGENT CARE | Facility: PHYSICIAN GROUP | Age: 51
End: 2021-11-01
Payer: COMMERCIAL

## 2021-11-01 VITALS
WEIGHT: 190 LBS | RESPIRATION RATE: 14 BRPM | BODY MASS INDEX: 30.53 KG/M2 | HEIGHT: 66 IN | DIASTOLIC BLOOD PRESSURE: 98 MMHG | OXYGEN SATURATION: 97 % | SYSTOLIC BLOOD PRESSURE: 138 MMHG | HEART RATE: 74 BPM | TEMPERATURE: 97.2 F

## 2021-11-01 DIAGNOSIS — N30.00 ACUTE CYSTITIS WITHOUT HEMATURIA: ICD-10-CM

## 2021-11-01 LAB
APPEARANCE UR: NORMAL
BILIRUB UR STRIP-MCNC: NEGATIVE MG/DL
COLOR UR AUTO: YELLOW
GLUCOSE UR STRIP.AUTO-MCNC: NEGATIVE MG/DL
KETONES UR STRIP.AUTO-MCNC: NEGATIVE MG/DL
LEUKOCYTE ESTERASE UR QL STRIP.AUTO: NORMAL
NITRITE UR QL STRIP.AUTO: NEGATIVE
PH UR STRIP.AUTO: 5.5 [PH] (ref 5–8)
PROT UR QL STRIP: NEGATIVE MG/DL
RBC UR QL AUTO: NORMAL
SP GR UR STRIP.AUTO: 1.02
UROBILINOGEN UR STRIP-MCNC: 0.2 MG/DL

## 2021-11-01 PROCEDURE — 81002 URINALYSIS NONAUTO W/O SCOPE: CPT | Performed by: FAMILY MEDICINE

## 2021-11-01 PROCEDURE — 99213 OFFICE O/P EST LOW 20 MIN: CPT | Performed by: FAMILY MEDICINE

## 2021-11-01 PROCEDURE — 87086 URINE CULTURE/COLONY COUNT: CPT

## 2021-11-01 RX ORDER — NITROFURANTOIN 25; 75 MG/1; MG/1
100 CAPSULE ORAL 2 TIMES DAILY
Qty: 10 CAPSULE | Refills: 0 | Status: SHIPPED | OUTPATIENT
Start: 2021-11-01 | End: 2021-11-06

## 2021-11-01 ASSESSMENT — FIBROSIS 4 INDEX: FIB4 SCORE: 0.74

## 2021-11-02 DIAGNOSIS — N30.00 ACUTE CYSTITIS WITHOUT HEMATURIA: ICD-10-CM

## 2021-11-02 NOTE — PROGRESS NOTES
Subjective:      CC:  presents with Dysuria            Dysuria   This is a new problem. The current episode started in the past 7 days. The problem occurs every urination. The problem has been unchanged. The quality of the pain is described as burning. There has been no fever. Pt is not sexually active. There is no history of pyelonephritis. Associated symptoms include lower abd pain     Pertinent negatives include no chills, discharge, flank pain, nausea or vomiting. Pt has tried nothing for the symptoms. There is no history of recurrent UTIs.     Social History     Socioeconomic History   • Marital status: Single     Spouse name: Not on file   • Number of children: Not on file   • Years of education: Not on file   • Highest education level: Not on file   Occupational History   • Not on file   Tobacco Use   • Smoking status: Never Smoker   • Smokeless tobacco: Never Used   Vaping Use   • Vaping Use: Never used   Substance and Sexual Activity   • Alcohol use: Yes     Alcohol/week: 0.0 oz     Comment: 1 beer a day, recently (was less before).    • Drug use: No     Comment: No, but uses CBD oil on shoulder.    • Sexual activity: Not Currently     Comment: [facility]    Other Topics Concern   • Not on file   Social History Narrative   • Not on file     Social Determinants of Health     Financial Resource Strain:    • Difficulty of Paying Living Expenses:    Food Insecurity:    • Worried About Running Out of Food in the Last Year:    • Ran Out of Food in the Last Year:    Transportation Needs:    • Lack of Transportation (Medical):    • Lack of Transportation (Non-Medical):    Physical Activity:    • Days of Exercise per Week:    • Minutes of Exercise per Session:    Stress:    • Feeling of Stress :    Social Connections:    • Frequency of Communication with Friends and Family:    • Frequency of Social Gatherings with Friends and Family:    • Attends [employer]ars[employer] Services:    • Active Member of Clubs or  "Organizations:    • Attends Club or Organization Meetings:    • Marital Status:    Intimate Partner Violence:    • Fear of Current or Ex-Partner:    • Emotionally Abused:    • Physically Abused:    • Sexually Abused:          Family History   Problem Relation Age of Onset   • Hypertension Mother    • Cancer Father         prostate   • No Known Problems Sister    • No Known Problems Brother    • Alzheimer's Disease Maternal Aunt    • Diabetes Maternal Grandmother    • Diabetes Paternal Grandfather    • Alzheimer's Disease Paternal Grandmother          Allergies   Allergen Reactions   • Minocycline Hives     Lip swelling   • Statins [Hmg-Coa-R Inhibitors]      Muscle aches with a few different types - years ago.            Current Outpatient Medications on File Prior to Visit   Medication Sig Dispense Refill   • citalopram (CELEXA) 20 MG Tab Take 1 tablet by mouth every day. 90 tablet 3   • colesevelam (WELCHOL) 625 MG Tab Take 3 Tablets by mouth 2 times a day, with meals. 180 tablet 5   • valACYclovir (VALTREX) 500 MG Tab Take 500 mg by mouth 2 times a day.       No current facility-administered medications on file prior to visit.       Review of Systems   Constitutional: Negative for chills.   Respiratory: Negative for shortness of breath.    Cardiovascular: Negative for chest pain.   Gastrointestinal: Negative for nausea, vomiting and abdominal pain.   Genitourinary: Positive for dysuria, urgency and frequency. Negative for flank pain.   Skin: Negative for rash.   Neurological: Negative for dizziness and headaches.   All other systems reviewed and are negative.         Objective:      /98   Pulse 74   Temp 36.2 °C (97.2 °F) (Temporal)   Resp 14   Ht 1.676 m (5' 6\")   Wt 86.2 kg (190 lb)   SpO2 97%       Physical Exam   Constitutional: pt is oriented to person, place, and time. Pt appears well-developed and well-nourished. No distress.   HENT:   Head: Normocephalic and atraumatic.   Mouth/Throat: Mucous " membranes are normal.   Eyes: Conjunctivae and EOM are normal. Pupils are equal, round, and reactive to light. Right eye exhibits no discharge. Left eye exhibits no discharge. No scleral icterus.   Neck: Normal range of motion. Neck supple.   Cardiovascular: Normal rate, regular rhythm, normal heart sounds and intact distal pulses.    No murmur heard.  Pulmonary/Chest: Effort normal and breath sounds normal. No respiratory distress. Pt has no wheezes,  rales.   Abdominal: Bowel sounds are normal. Pt exhibits no distension and no mass. There is no tenderness. There is no rebound, no guarding and no CVA tenderness.   Neurological: pt is alert and oriented to person, place, and time.   Skin: Skin is warm and dry.   Psychiatric: behavior is normal.   Nursing note and vitals reviewed.           Lab Results   Component Value Date/Time    POCCOLOR yellow 11/01/2021 06:18 PM    POCAPPEAR slightly cloudy 11/01/2021 06:18 PM    POCLEUKEST trace 11/01/2021 06:18 PM    POCNITRITE negative 11/01/2021 06:18 PM    POCUROBILIGE 0.2 11/01/2021 06:18 PM    POCPROTEIN negative 11/01/2021 06:18 PM    POCURPH 5.5 11/01/2021 06:18 PM    POCBLOOD small 11/01/2021 06:18 PM    POCSPGRV 1.025 11/01/2021 06:18 PM    POCKETONES negative 11/01/2021 06:18 PM    POCBILIRUBIN negative 11/01/2021 06:18 PM    POCGLUCUA negative 11/01/2021 06:18 PM            Assessment/Plan:     1. Acute cystitis without hematuria  UA c/w infection      - nitrofurantoin (MACROBID) 100 MG Cap; Take 1 Capsule by mouth 2 times a day for 5 days.  Dispense: 10 Capsule; Refill: 0    If no improvement after 2 days, will schedule CT to r/o kidney stone.

## 2021-11-04 ENCOUNTER — OFFICE VISIT (OUTPATIENT)
Dept: URGENT CARE | Facility: PHYSICIAN GROUP | Age: 51
End: 2021-11-04
Payer: COMMERCIAL

## 2021-11-04 ENCOUNTER — HOSPITAL ENCOUNTER (OUTPATIENT)
Dept: RADIOLOGY | Facility: MEDICAL CENTER | Age: 51
End: 2021-11-04
Attending: PHYSICIAN ASSISTANT
Payer: COMMERCIAL

## 2021-11-04 VITALS
BODY MASS INDEX: 31.65 KG/M2 | SYSTOLIC BLOOD PRESSURE: 118 MMHG | HEART RATE: 79 BPM | DIASTOLIC BLOOD PRESSURE: 78 MMHG | OXYGEN SATURATION: 98 % | TEMPERATURE: 97.8 F | RESPIRATION RATE: 16 BRPM | HEIGHT: 65 IN | WEIGHT: 190 LBS

## 2021-11-04 DIAGNOSIS — R10.9 LEFT FLANK PAIN: ICD-10-CM

## 2021-11-04 DIAGNOSIS — N20.0 CALCULUS OF KIDNEY: ICD-10-CM

## 2021-11-04 DIAGNOSIS — R10.9 ABDOMINAL PAIN, UNSPECIFIED ABDOMINAL LOCATION: ICD-10-CM

## 2021-11-04 DIAGNOSIS — R10.32 LEFT LOWER QUADRANT ABDOMINAL PAIN: ICD-10-CM

## 2021-11-04 LAB
BACTERIA UR CULT: NORMAL
SIGNIFICANT IND 70042: NORMAL
SITE SITE: NORMAL
SOURCE SOURCE: NORMAL

## 2021-11-04 PROCEDURE — 99214 OFFICE O/P EST MOD 30 MIN: CPT | Performed by: PHYSICIAN ASSISTANT

## 2021-11-04 PROCEDURE — 74176 CT ABD & PELVIS W/O CONTRAST: CPT

## 2021-11-04 ASSESSMENT — FIBROSIS 4 INDEX: FIB4 SCORE: 0.74

## 2021-11-04 NOTE — PROGRESS NOTES
"Subjective:   Any Schwarz is a 51 y.o. female who presents for Abdominal Pain (sharp pain in left side mid abdomen, comes and goes, x1 week. )      HPI  Patient is a 51-year-old female who presents to clinic with complaints of 1 week of intermittent left mid to lower abdominal pain and left flank pain.  She was seen here in the urgent care on 11/01/2021 and was diagnosed \"acute cystitis without hematuria\" and was prescribed nitrofurantoin.  Review of urine culture is negative.  Review of UA during that visit showed small blood.  She had some urinary frequency at that time but she states she never had dysuria or urgency.  She is having frequency because she drinks a lot of fluid she states.  No aggravating factors.  Somewhat relief with urination.  She states she has no current abdominal pain.  She states she when she feels the abdominal pain it is approximately 8/10 in severity.  She has been having normal bowel movements without bloody or black stools.  She is not sexually active and has not been sexually active for years. Denies any fever, chills, cough, chest pain, SOB, nausea, vomiting, vaginal discharge, vaginal lesions, itching. No history of kidney stone. No family hitsory of kidney stone. History of hysterectomy. Drinks aclohol a couple times per week.       Medications:    • citalopram Tabs  • colesevelam Tabs  • nitrofurantoin Caps  • valACYclovir Tabs    Allergies: Minocycline and Statins [hmg-coa-r inhibitors]    Problem List: Any Schwarz does not have any pertinent problems on file.    Surgical History:  Past Surgical History:   Procedure Laterality Date   • ABDOMINAL HYSTERECTOMY TOTAL  6/24/2014    Performed by Hernandez Kilpatrick M.D. at SURGERY [facility] ORS   • HYSTEROSCOPY WITH VIDEO OPERATIVE  11/25/2013    Performed by Hernandez Kilpatrick M.D. at [facility]       Past Social Hx: Any Schawrz  reports that she has never smoked. She has never used " "smokeless tobacco. She reports current alcohol use. She reports that she does not use drugs.     Past Family Hx:  Any Schwarz family history includes Alzheimer's Disease in her maternal aunt and paternal grandmother; Cancer in her father; Diabetes in her maternal grandmother and paternal grandfather; Hypertension in her mother; No Known Problems in her brother and sister.     Problem list, medications, and allergies reviewed by myself today in Epic.     Objective:     /78   Pulse 79   Temp 36.6 °C (97.8 °F) (Temporal)   Resp 16   Ht 1.651 m (5' 5\")   Wt 86.2 kg (190 lb)   LMP 05/01/2014   SpO2 98%   BMI 31.62 kg/m²     Physical Exam  Vitals reviewed.   Constitutional:       General: She is not in acute distress.     Appearance: Normal appearance. She is not ill-appearing or toxic-appearing.   Eyes:      Conjunctiva/sclera: Conjunctivae normal.      Pupils: Pupils are equal, round, and reactive to light.   Cardiovascular:      Rate and Rhythm: Normal rate and regular rhythm.      Heart sounds: Normal heart sounds.   Pulmonary:      Effort: Pulmonary effort is normal. No respiratory distress.      Breath sounds: Normal breath sounds. No wheezing, rhonchi or rales.   Abdominal:      General: Abdomen is flat. Bowel sounds are increased. There is no distension.      Palpations: Abdomen is soft. There is no hepatomegaly, splenomegaly or mass.      Tenderness: There is no right CVA tenderness, left CVA tenderness, guarding or rebound. Negative signs include Ackerman's sign and McBurney's sign.      Comments: Minimal tenderness to palpation diffusely to left upper and lower abdomen.   Musculoskeletal:      Cervical back: Neck supple.   Skin:     General: Skin is warm and dry.   Neurological:      General: No focal deficit present.      Mental Status: She is alert and oriented to person, place, and time.   Psychiatric:         Mood and Affect: Mood normal.         Behavior: Behavior normal. "         RADIOLOGY RESULTS   CT-RENAL COLIC EVALUATION(A/P W/O)    Result Date: 11/4/2021 11/4/2021 6:15 PM HISTORY/REASON FOR EXAM:  Flank pain, kidney stone suspected; left flank pain. left abdominal pain. TECHNIQUE/EXAM DESCRIPTION: CT abdomen/pelvis without contrast. 5 mm helical images of the abdomen and pelvis were obtained from the diaphragmatic domes through the pubic symphysis. Low dose optimization technique was utilized for this CT exam including automated exposure control and adjustment of the mA and/or kV according to patient size. COMPARISON: None. FINDINGS: Lung bases show no consolidation. There is a slight subpleural opacity in the left lower lobe is likely postinflammatory and does not have specific findings requiring follow-up Lack of IV contrast decreases sensitivity of the study in evaluation of solid organs and bowel but is optimized for detection of urolithiasis. There is a left UPJ 13 x 13 x 7 mm calculus measuring 1200 Hounsfield units in this causes mild left hydronephrosis. There is minimal fat stranding. There is a lower pole 3 mm calculus No right urolithiasis is seen. There is no right hydroureteronephrosis. Bladder is unremarkable. Hysterectomy. 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 No free fluid. No free air. No acute bowel abnormality is noted. Note there is no enteric contrast. No bony destructive process.     1.  13 mm left UPJ calculus causes mild hydronephrosis   2.  3 mm left lower pole calculus   3.  Hepatic steatosis   4.  Gallstones         Diagnosis and associated orders:     1. Calculus of kidney  Referral to Urology   2. Abdominal pain, unspecified abdominal location  CBC WITH DIFFERENTIAL    Comp Metabolic Panel    Referral to Urology    POCT Urinalysis   3. Left  flank pain  CT-RENAL COLIC EVALUATION(A/P W/O)    Referral to Urology    POCT Urinalysis        Comments/MDM:     • This is a pleasant 51-year-old female presents to clinic with complaints of left mid to lower abdominal pain intermittently with intermittent left flank pain as well for the past week.  See full history above.  She has been taking nitrofurantoin but urine culture recently was negative.  UA today shows trace intact blood and trace leukocytes.  Discussed other differential diagnosis to include but not limited to interstitial cystitis, colitis, kidney stone, ureteral stone, constipation, SBO.  Through shared decision-making, it was agreed to order CT renal to rule out kidney stone.  • CT results per radiologist interpretation above. I personally reviewed images and radiologist report.  There is a 13 mm left UPJ calculus.  Discussed findings with the patient.  Discussed this may be the cause of her pain.   • Referral urgently to urology  • Recommend plenty of fluids, ibuprofen for pain.   • Patient overall is very well-appearing.  She is afebrile.  She is in no acute distress.  She currently complains of no abdominal pain at rest.         I personally reviewed prior external notes and test results pertinent to today's visit. Red flags discussed and indications to present to the Emergency Department. Supportive care, natural history, differential diagnoses, and indications for immediate follow-up discussed. Patient expresses understanding and agrees to plan. Patient denies any other questions or concerns.     Follow-up with the primary care physician for recheck, reevaluation, and consideration of further management.    Please note that this dictation was created using voice recognition software. I have made a reasonable attempt to correct obvious errors, but I expect that there are errors of grammar and possibly content that I did not discover before finalizing the note.    This note was electronically  signed by Andrew Diaz PA-C

## 2021-11-08 ENCOUNTER — HOSPITAL ENCOUNTER (OUTPATIENT)
Dept: LAB | Facility: MEDICAL CENTER | Age: 51
End: 2021-11-08
Attending: PHYSICIAN ASSISTANT
Payer: COMMERCIAL

## 2021-11-08 DIAGNOSIS — R10.9 ABDOMINAL PAIN, UNSPECIFIED ABDOMINAL LOCATION: ICD-10-CM

## 2021-11-08 LAB
ALBUMIN SERPL BCP-MCNC: 4.3 G/DL (ref 3.2–4.9)
ALBUMIN/GLOB SERPL: 1.6 G/DL
ALP SERPL-CCNC: 100 U/L (ref 30–99)
ALT SERPL-CCNC: 36 U/L (ref 2–50)
ANION GAP SERPL CALC-SCNC: 10 MMOL/L (ref 7–16)
AST SERPL-CCNC: 27 U/L (ref 12–45)
BASOPHILS # BLD AUTO: 2.1 % (ref 0–1.8)
BASOPHILS # BLD: 0.12 K/UL (ref 0–0.12)
BILIRUB SERPL-MCNC: 0.3 MG/DL (ref 0.1–1.5)
BUN SERPL-MCNC: 19 MG/DL (ref 8–22)
CALCIUM SERPL-MCNC: 9.4 MG/DL (ref 8.5–10.5)
CHLORIDE SERPL-SCNC: 105 MMOL/L (ref 96–112)
CO2 SERPL-SCNC: 24 MMOL/L (ref 20–33)
CREAT SERPL-MCNC: 0.85 MG/DL (ref 0.5–1.4)
EOSINOPHIL # BLD AUTO: 0.21 K/UL (ref 0–0.51)
EOSINOPHIL NFR BLD: 3.6 % (ref 0–6.9)
ERYTHROCYTE [DISTWIDTH] IN BLOOD BY AUTOMATED COUNT: 44.3 FL (ref 35.9–50)
FASTING STATUS PATIENT QL REPORTED: NORMAL
GLOBULIN SER CALC-MCNC: 2.7 G/DL (ref 1.9–3.5)
GLUCOSE SERPL-MCNC: 90 MG/DL (ref 65–99)
HCT VFR BLD AUTO: 42.8 % (ref 37–47)
HGB BLD-MCNC: 14.4 G/DL (ref 12–16)
IMM GRANULOCYTES # BLD AUTO: 0.01 K/UL (ref 0–0.11)
IMM GRANULOCYTES NFR BLD AUTO: 0.2 % (ref 0–0.9)
LYMPHOCYTES # BLD AUTO: 1.81 K/UL (ref 1–4.8)
LYMPHOCYTES NFR BLD: 31 % (ref 22–41)
MCH RBC QN AUTO: 30.6 PG (ref 27–33)
MCHC RBC AUTO-ENTMCNC: 33.6 G/DL (ref 33.6–35)
MCV RBC AUTO: 90.9 FL (ref 81.4–97.8)
MONOCYTES # BLD AUTO: 0.42 K/UL (ref 0–0.85)
MONOCYTES NFR BLD AUTO: 7.2 % (ref 0–13.4)
NEUTROPHILS # BLD AUTO: 3.26 K/UL (ref 2–7.15)
NEUTROPHILS NFR BLD: 55.9 % (ref 44–72)
NRBC # BLD AUTO: 0 K/UL
NRBC BLD-RTO: 0 /100 WBC
PLATELET # BLD AUTO: 305 K/UL (ref 164–446)
PMV BLD AUTO: 10.7 FL (ref 9–12.9)
POTASSIUM SERPL-SCNC: 4.5 MMOL/L (ref 3.6–5.5)
PROT SERPL-MCNC: 7 G/DL (ref 6–8.2)
RBC # BLD AUTO: 4.71 M/UL (ref 4.2–5.4)
SODIUM SERPL-SCNC: 139 MMOL/L (ref 135–145)
WBC # BLD AUTO: 5.8 K/UL (ref 4.8–10.8)

## 2021-11-08 PROCEDURE — 85025 COMPLETE CBC W/AUTO DIFF WBC: CPT

## 2021-11-08 PROCEDURE — 80053 COMPREHEN METABOLIC PANEL: CPT

## 2021-11-08 PROCEDURE — 36415 COLL VENOUS BLD VENIPUNCTURE: CPT

## 2021-12-13 ENCOUNTER — PRE-ADMISSION TESTING (OUTPATIENT)
Dept: ADMISSIONS | Facility: MEDICAL CENTER | Age: 51
End: 2021-12-13
Attending: UROLOGY
Payer: COMMERCIAL

## 2021-12-13 DIAGNOSIS — Z01.812 PRE-OPERATIVE LABORATORY EXAMINATION: ICD-10-CM

## 2021-12-13 LAB
ANION GAP SERPL CALC-SCNC: 9 MMOL/L (ref 7–16)
APPEARANCE UR: CLEAR
BASOPHILS # BLD AUTO: 1.9 % (ref 0–1.8)
BASOPHILS # BLD: 0.12 K/UL (ref 0–0.12)
BILIRUB UR QL STRIP.AUTO: NEGATIVE
BUN SERPL-MCNC: 19 MG/DL (ref 8–22)
CALCIUM SERPL-MCNC: 9.4 MG/DL (ref 8.4–10.2)
CHLORIDE SERPL-SCNC: 109 MMOL/L (ref 96–112)
CO2 SERPL-SCNC: 24 MMOL/L (ref 20–33)
COLOR UR: YELLOW
CREAT SERPL-MCNC: 0.74 MG/DL (ref 0.5–1.4)
EOSINOPHIL # BLD AUTO: 0.25 K/UL (ref 0–0.51)
EOSINOPHIL NFR BLD: 4.1 % (ref 0–6.9)
EPI CELLS #/AREA URNS HPF: NORMAL /HPF
EPITHELIAL CELLS RENAL  1715R: NORMAL /HPF
ERYTHROCYTE [DISTWIDTH] IN BLOOD BY AUTOMATED COUNT: 43.9 FL (ref 35.9–50)
GLUCOSE SERPL-MCNC: 85 MG/DL (ref 65–99)
GLUCOSE UR STRIP.AUTO-MCNC: NEGATIVE MG/DL
HCT VFR BLD AUTO: 44 % (ref 37–47)
HGB BLD-MCNC: 14.3 G/DL (ref 12–16)
IMM GRANULOCYTES # BLD AUTO: 0.01 K/UL (ref 0–0.11)
IMM GRANULOCYTES NFR BLD AUTO: 0.2 % (ref 0–0.9)
INR PPP: 0.91 (ref 0.87–1.13)
KETONES UR STRIP.AUTO-MCNC: NEGATIVE MG/DL
LEUKOCYTE ESTERASE UR QL STRIP.AUTO: ABNORMAL
LYMPHOCYTES # BLD AUTO: 2.42 K/UL (ref 1–4.8)
LYMPHOCYTES NFR BLD: 39.3 % (ref 22–41)
MCH RBC QN AUTO: 29.9 PG (ref 27–33)
MCHC RBC AUTO-ENTMCNC: 32.5 G/DL (ref 33.6–35)
MCV RBC AUTO: 91.9 FL (ref 81.4–97.8)
MICRO URNS: ABNORMAL
MONOCYTES # BLD AUTO: 0.49 K/UL (ref 0–0.85)
MONOCYTES NFR BLD AUTO: 8 % (ref 0–13.4)
NEUTROPHILS # BLD AUTO: 2.87 K/UL (ref 2–7.15)
NEUTROPHILS NFR BLD: 46.5 % (ref 44–72)
NITRITE UR QL STRIP.AUTO: NEGATIVE
NRBC # BLD AUTO: 0 K/UL
NRBC BLD-RTO: 0 /100 WBC
PH UR STRIP.AUTO: 6.5 [PH] (ref 5–8)
PLATELET # BLD AUTO: 302 K/UL (ref 164–446)
PMV BLD AUTO: 11.2 FL (ref 9–12.9)
POTASSIUM SERPL-SCNC: 4 MMOL/L (ref 3.6–5.5)
PROT UR QL STRIP: NEGATIVE MG/DL
PROTHROMBIN TIME: 11.5 SEC (ref 12–14.6)
RBC # BLD AUTO: 4.79 M/UL (ref 4.2–5.4)
RBC # URNS HPF: NORMAL /HPF
RBC UR QL AUTO: ABNORMAL
SODIUM SERPL-SCNC: 142 MMOL/L (ref 135–145)
SP GR UR STRIP.AUTO: 1.01
WBC # BLD AUTO: 6.2 K/UL (ref 4.8–10.8)
WBC #/AREA URNS HPF: NORMAL /HPF

## 2021-12-13 PROCEDURE — 80048 BASIC METABOLIC PNL TOTAL CA: CPT

## 2021-12-13 PROCEDURE — 87086 URINE CULTURE/COLONY COUNT: CPT

## 2021-12-13 PROCEDURE — C9803 HOPD COVID-19 SPEC COLLECT: HCPCS

## 2021-12-13 PROCEDURE — U0003 INFECTIOUS AGENT DETECTION BY NUCLEIC ACID (DNA OR RNA); SEVERE ACUTE RESPIRATORY SYNDROME CORONAVIRUS 2 (SARS-COV-2) (CORONAVIRUS DISEASE [COVID-19]), AMPLIFIED PROBE TECHNIQUE, MAKING USE OF HIGH THROUGHPUT TECHNOLOGIES AS DESCRIBED BY CMS-2020-01-R: HCPCS

## 2021-12-13 PROCEDURE — 85610 PROTHROMBIN TIME: CPT

## 2021-12-13 PROCEDURE — 36415 COLL VENOUS BLD VENIPUNCTURE: CPT

## 2021-12-13 PROCEDURE — 81001 URINALYSIS AUTO W/SCOPE: CPT

## 2021-12-13 PROCEDURE — 85025 COMPLETE CBC W/AUTO DIFF WBC: CPT

## 2021-12-13 PROCEDURE — U0005 INFEC AGEN DETEC AMPLI PROBE: HCPCS

## 2021-12-13 ASSESSMENT — FIBROSIS 4 INDEX: FIB4 SCORE: 0.78

## 2021-12-13 NOTE — PREPROCEDURE INSTRUCTIONS
Preadmit appointment:  Patient instructed to continue prescribed medications through the day before surgery, instructed to take the following medications the day of surgery per anesthesia protocol: none.  All Pt's questions and concerns answered or addressed.

## 2021-12-14 LAB
SARS-COV-2 RNA RESP QL NAA+PROBE: NOTDETECTED
SPECIMEN SOURCE: NORMAL

## 2021-12-15 LAB
BACTERIA UR CULT: NORMAL
SIGNIFICANT IND 70042: NORMAL
SITE SITE: NORMAL
SOURCE SOURCE: NORMAL

## 2021-12-16 ENCOUNTER — ANESTHESIA (OUTPATIENT)
Dept: SURGERY | Facility: MEDICAL CENTER | Age: 51
End: 2021-12-16
Payer: COMMERCIAL

## 2021-12-16 ENCOUNTER — ANESTHESIA EVENT (OUTPATIENT)
Dept: SURGERY | Facility: MEDICAL CENTER | Age: 51
End: 2021-12-16
Payer: COMMERCIAL

## 2021-12-16 ENCOUNTER — HOSPITAL ENCOUNTER (OUTPATIENT)
Facility: MEDICAL CENTER | Age: 51
End: 2021-12-16
Attending: UROLOGY | Admitting: UROLOGY
Payer: COMMERCIAL

## 2021-12-16 ENCOUNTER — APPOINTMENT (OUTPATIENT)
Dept: RADIOLOGY | Facility: MEDICAL CENTER | Age: 51
End: 2021-12-16
Attending: UROLOGY
Payer: COMMERCIAL

## 2021-12-16 VITALS
WEIGHT: 193.78 LBS | BODY MASS INDEX: 32.29 KG/M2 | OXYGEN SATURATION: 95 % | TEMPERATURE: 97.5 F | HEART RATE: 72 BPM | SYSTOLIC BLOOD PRESSURE: 126 MMHG | DIASTOLIC BLOOD PRESSURE: 66 MMHG | HEIGHT: 65 IN | RESPIRATION RATE: 16 BRPM

## 2021-12-16 DIAGNOSIS — N20.0 KIDNEY STONE: ICD-10-CM

## 2021-12-16 LAB — PATHOLOGY CONSULT NOTE: NORMAL

## 2021-12-16 PROCEDURE — 700102 HCHG RX REV CODE 250 W/ 637 OVERRIDE(OP): Performed by: INTERNAL MEDICINE

## 2021-12-16 PROCEDURE — A9270 NON-COVERED ITEM OR SERVICE: HCPCS | Performed by: INTERNAL MEDICINE

## 2021-12-16 PROCEDURE — 700101 HCHG RX REV CODE 250: Performed by: INTERNAL MEDICINE

## 2021-12-16 PROCEDURE — 82365 CALCULUS SPECTROSCOPY: CPT

## 2021-12-16 PROCEDURE — 500062 HCHG BASKET: Performed by: UROLOGY

## 2021-12-16 PROCEDURE — 501330 HCHG SET, CYSTO IRRIG TUBING: Performed by: UROLOGY

## 2021-12-16 PROCEDURE — C2617 STENT, NON-COR, TEM W/O DEL: HCPCS | Performed by: UROLOGY

## 2021-12-16 PROCEDURE — 500879 HCHG PACK, CYSTO: Performed by: UROLOGY

## 2021-12-16 PROCEDURE — C1769 GUIDE WIRE: HCPCS | Performed by: UROLOGY

## 2021-12-16 PROCEDURE — 700111 HCHG RX REV CODE 636 W/ 250 OVERRIDE (IP): Performed by: INTERNAL MEDICINE

## 2021-12-16 PROCEDURE — C1758 CATHETER, URETERAL: HCPCS | Performed by: UROLOGY

## 2021-12-16 PROCEDURE — 160025 RECOVERY II MINUTES (STATS): Performed by: UROLOGY

## 2021-12-16 PROCEDURE — 700105 HCHG RX REV CODE 258: Performed by: UROLOGY

## 2021-12-16 PROCEDURE — 74018 RADEX ABDOMEN 1 VIEW: CPT

## 2021-12-16 PROCEDURE — 160028 HCHG SURGERY MINUTES - 1ST 30 MINS LEVEL 3: Performed by: UROLOGY

## 2021-12-16 PROCEDURE — 160039 HCHG SURGERY MINUTES - EA ADDL 1 MIN LEVEL 3: Performed by: UROLOGY

## 2021-12-16 PROCEDURE — C1894 INTRO/SHEATH, NON-LASER: HCPCS | Performed by: UROLOGY

## 2021-12-16 PROCEDURE — 500892 HCHG PACK, PERI-GYN: Performed by: UROLOGY

## 2021-12-16 PROCEDURE — 501838 HCHG SUTURE GENERAL: Performed by: UROLOGY

## 2021-12-16 PROCEDURE — 501329 HCHG SET, CYSTO IRRIG Y TUR: Performed by: UROLOGY

## 2021-12-16 PROCEDURE — 502240 HCHG MISC OR SUPPLY RC 0272: Performed by: UROLOGY

## 2021-12-16 PROCEDURE — 88300 SURGICAL PATH GROSS: CPT

## 2021-12-16 PROCEDURE — 160035 HCHG PACU - 1ST 60 MINS PHASE I: Performed by: UROLOGY

## 2021-12-16 PROCEDURE — 160002 HCHG RECOVERY MINUTES (STAT): Performed by: UROLOGY

## 2021-12-16 PROCEDURE — 160048 HCHG OR STATISTICAL LEVEL 1-5: Performed by: UROLOGY

## 2021-12-16 PROCEDURE — 160009 HCHG ANES TIME/MIN: Performed by: UROLOGY

## 2021-12-16 PROCEDURE — 160046 HCHG PACU - 1ST 60 MINS PHASE II: Performed by: UROLOGY

## 2021-12-16 DEVICE — STENT UROLOGICAL POLARIS 6X26  ULTRA: Type: IMPLANTABLE DEVICE | Site: URETER | Status: FUNCTIONAL

## 2021-12-16 RX ORDER — HYDROMORPHONE HYDROCHLORIDE 2 MG/ML
0.2 INJECTION, SOLUTION INTRAMUSCULAR; INTRAVENOUS; SUBCUTANEOUS
Status: DISCONTINUED | OUTPATIENT
Start: 2021-12-16 | End: 2021-12-16 | Stop reason: HOSPADM

## 2021-12-16 RX ORDER — HYDRALAZINE HYDROCHLORIDE 20 MG/ML
5 INJECTION INTRAMUSCULAR; INTRAVENOUS
Status: DISCONTINUED | OUTPATIENT
Start: 2021-12-16 | End: 2021-12-16 | Stop reason: HOSPADM

## 2021-12-16 RX ORDER — TAMSULOSIN HYDROCHLORIDE 0.4 MG/1
0.4 CAPSULE ORAL DAILY
Qty: 21 CAPSULE | Refills: 1 | Status: SHIPPED
Start: 2021-12-16 | End: 2022-05-10

## 2021-12-16 RX ORDER — CEFAZOLIN SODIUM 1 G/3ML
INJECTION, POWDER, FOR SOLUTION INTRAMUSCULAR; INTRAVENOUS PRN
Status: DISCONTINUED | OUTPATIENT
Start: 2021-12-16 | End: 2021-12-16 | Stop reason: SURG

## 2021-12-16 RX ORDER — DEXAMETHASONE SODIUM PHOSPHATE 4 MG/ML
INJECTION, SOLUTION INTRA-ARTICULAR; INTRALESIONAL; INTRAMUSCULAR; INTRAVENOUS; SOFT TISSUE PRN
Status: DISCONTINUED | OUTPATIENT
Start: 2021-12-16 | End: 2021-12-16 | Stop reason: SURG

## 2021-12-16 RX ORDER — OXYCODONE HCL 5 MG/5 ML
10 SOLUTION, ORAL ORAL
Status: DISCONTINUED | OUTPATIENT
Start: 2021-12-16 | End: 2021-12-16 | Stop reason: HOSPADM

## 2021-12-16 RX ORDER — HYDROCODONE BITARTRATE AND ACETAMINOPHEN 7.5; 325 MG/1; MG/1
1 TABLET ORAL EVERY 8 HOURS PRN
Qty: 15 TABLET | Refills: 0 | Status: SHIPPED | OUTPATIENT
Start: 2021-12-16 | End: 2021-12-21

## 2021-12-16 RX ORDER — HYDROMORPHONE HYDROCHLORIDE 2 MG/ML
0.1 INJECTION, SOLUTION INTRAMUSCULAR; INTRAVENOUS; SUBCUTANEOUS
Status: DISCONTINUED | OUTPATIENT
Start: 2021-12-16 | End: 2021-12-16 | Stop reason: HOSPADM

## 2021-12-16 RX ORDER — ONDANSETRON 2 MG/ML
INJECTION INTRAMUSCULAR; INTRAVENOUS PRN
Status: DISCONTINUED | OUTPATIENT
Start: 2021-12-16 | End: 2021-12-16 | Stop reason: SURG

## 2021-12-16 RX ORDER — DIPHENHYDRAMINE HYDROCHLORIDE 50 MG/ML
12.5 INJECTION INTRAMUSCULAR; INTRAVENOUS
Status: DISCONTINUED | OUTPATIENT
Start: 2021-12-16 | End: 2021-12-16 | Stop reason: HOSPADM

## 2021-12-16 RX ORDER — OXYCODONE HCL 5 MG/5 ML
5 SOLUTION, ORAL ORAL
Status: DISCONTINUED | OUTPATIENT
Start: 2021-12-16 | End: 2021-12-16 | Stop reason: HOSPADM

## 2021-12-16 RX ORDER — OXYBUTYNIN CHLORIDE 5 MG/1
5 TABLET ORAL EVERY 8 HOURS PRN
Qty: 21 TABLET | Refills: 1 | Status: SHIPPED
Start: 2021-12-16 | End: 2022-05-10

## 2021-12-16 RX ORDER — MEPERIDINE HYDROCHLORIDE 25 MG/ML
12.5 INJECTION INTRAMUSCULAR; INTRAVENOUS; SUBCUTANEOUS
Status: DISCONTINUED | OUTPATIENT
Start: 2021-12-16 | End: 2021-12-16 | Stop reason: HOSPADM

## 2021-12-16 RX ORDER — HALOPERIDOL 5 MG/ML
1 INJECTION INTRAMUSCULAR
Status: DISCONTINUED | OUTPATIENT
Start: 2021-12-16 | End: 2021-12-16 | Stop reason: HOSPADM

## 2021-12-16 RX ORDER — LABETALOL HYDROCHLORIDE 5 MG/ML
5 INJECTION, SOLUTION INTRAVENOUS
Status: DISCONTINUED | OUTPATIENT
Start: 2021-12-16 | End: 2021-12-16 | Stop reason: HOSPADM

## 2021-12-16 RX ORDER — CEFAZOLIN SODIUM IN 0.9 % NACL 2 G/100 ML
2 PLASTIC BAG, INJECTION (ML) INTRAVENOUS ONCE
Status: DISCONTINUED | OUTPATIENT
Start: 2021-12-16 | End: 2021-12-16 | Stop reason: HOSPADM

## 2021-12-16 RX ORDER — HYDROMORPHONE HYDROCHLORIDE 2 MG/ML
0.4 INJECTION, SOLUTION INTRAMUSCULAR; INTRAVENOUS; SUBCUTANEOUS
Status: DISCONTINUED | OUTPATIENT
Start: 2021-12-16 | End: 2021-12-16 | Stop reason: HOSPADM

## 2021-12-16 RX ORDER — LIDOCAINE HYDROCHLORIDE 20 MG/ML
INJECTION, SOLUTION EPIDURAL; INFILTRATION; INTRACAUDAL; PERINEURAL PRN
Status: DISCONTINUED | OUTPATIENT
Start: 2021-12-16 | End: 2021-12-16 | Stop reason: SURG

## 2021-12-16 RX ORDER — SCOLOPAMINE TRANSDERMAL SYSTEM 1 MG/1
1 PATCH, EXTENDED RELEASE TRANSDERMAL
Status: DISCONTINUED | OUTPATIENT
Start: 2021-12-16 | End: 2021-12-16 | Stop reason: HOSPADM

## 2021-12-16 RX ORDER — SODIUM CHLORIDE, SODIUM LACTATE, POTASSIUM CHLORIDE, CALCIUM CHLORIDE 600; 310; 30; 20 MG/100ML; MG/100ML; MG/100ML; MG/100ML
INJECTION, SOLUTION INTRAVENOUS CONTINUOUS
Status: ACTIVE | OUTPATIENT
Start: 2021-12-16 | End: 2021-12-16

## 2021-12-16 RX ORDER — ONDANSETRON 2 MG/ML
4 INJECTION INTRAMUSCULAR; INTRAVENOUS
Status: DISCONTINUED | OUTPATIENT
Start: 2021-12-16 | End: 2021-12-16 | Stop reason: HOSPADM

## 2021-12-16 RX ORDER — KETOROLAC TROMETHAMINE 30 MG/ML
INJECTION, SOLUTION INTRAMUSCULAR; INTRAVENOUS PRN
Status: DISCONTINUED | OUTPATIENT
Start: 2021-12-16 | End: 2021-12-16 | Stop reason: SURG

## 2021-12-16 RX ADMIN — SODIUM CHLORIDE, POTASSIUM CHLORIDE, SODIUM LACTATE AND CALCIUM CHLORIDE: 600; 310; 30; 20 INJECTION, SOLUTION INTRAVENOUS at 10:38

## 2021-12-16 RX ADMIN — EPHEDRINE SULFATE 10 MG: 50 INJECTION INTRAMUSCULAR; INTRAVENOUS; SUBCUTANEOUS at 12:21

## 2021-12-16 RX ADMIN — SCOPALAMINE 1 PATCH: 1 PATCH, EXTENDED RELEASE TRANSDERMAL at 10:55

## 2021-12-16 RX ADMIN — ONDANSETRON 4 MG: 2 INJECTION INTRAMUSCULAR; INTRAVENOUS at 12:49

## 2021-12-16 RX ADMIN — FENTANYL CITRATE 100 MCG: 50 INJECTION, SOLUTION INTRAMUSCULAR; INTRAVENOUS at 12:14

## 2021-12-16 RX ADMIN — CEFAZOLIN 2 G: 330 INJECTION, POWDER, FOR SOLUTION INTRAMUSCULAR; INTRAVENOUS at 12:16

## 2021-12-16 RX ADMIN — DEXAMETHASONE SODIUM PHOSPHATE 4 MG: 4 INJECTION, SOLUTION INTRAMUSCULAR; INTRAVENOUS at 12:14

## 2021-12-16 RX ADMIN — PROPOFOL 200 MG: 10 INJECTION, EMULSION INTRAVENOUS at 12:14

## 2021-12-16 RX ADMIN — LIDOCAINE HYDROCHLORIDE 80 MG: 20 INJECTION, SOLUTION EPIDURAL; INFILTRATION; INTRACAUDAL; PERINEURAL at 12:14

## 2021-12-16 RX ADMIN — KETOROLAC TROMETHAMINE 30 MG: 30 INJECTION, SOLUTION INTRAMUSCULAR at 12:52

## 2021-12-16 ASSESSMENT — PAIN DESCRIPTION - PAIN TYPE
TYPE: OTHER (COMMENT)
TYPE: SURGICAL PAIN
TYPE: SURGICAL PAIN

## 2021-12-16 ASSESSMENT — FIBROSIS 4 INDEX: FIB4 SCORE: 0.79

## 2021-12-16 NOTE — DISCHARGE INSTRUCTIONS
ACTIVITY: Rest and take it easy for the first 24 hours.  A responsible adult is recommended to remain with you during that time.  It is normal to feel sleepy.  We encourage you to not do anything that requires balance, judgment or coordination.    MILD FLU-LIKE SYMPTOMS ARE NORMAL. YOU MAY EXPERIENCE GENERALIZED MUSCLE ACHES, THROAT IRRITATION, HEADACHE AND/OR SOME NAUSEA.     FOR 24 HOURS DO NOT:  Drive, operate machinery or run household appliances.  Drink beer or alcoholic beverages.   Make important decisions or sign legal documents.    SPECIAL INSTRUCTIONS: you have strings taped to your leg, do not pull these out. Dr. Carpenter's office will call you to set up appointment to remove these in his office.       DIET: To avoid nausea, slowly advance diet as tolerated, avoiding spicy or greasy foods for the first day.  Add more substantial food to your diet according to your physician's instructions.   INCREASE FLUIDS AND FIBER TO AVOID CONSTIPATION.      FOLLOW-UP APPOINTMENT:  A follow-up appointment should be arranged with your doctor; call to schedule.    You should CALL YOUR PHYSICIAN if you develop:  Fever greater than 101 degrees F.  Pain not relieved by medication, or persistent nausea or vomiting.  Excessive bleeding (blood soaking through dressing) or unexpected drainage from the wound.  Extreme redness or swelling around the incision site, drainage of pus or foul smelling drainage.  Inability to urinate or empty your bladder within 8 hours.  Problems with breathing or chest pain.    You should call 911 if you develop problems with breathing or chest pain.  If you are unable to contact your doctor or surgical center, you should go to the nearest emergency room or urgent care center.  Physician's telephone #: [phone]    If any questions arise, call your doctor.  If your doctor is not available, please feel free to call the Surgical Center at [phone].     A registered nurse may call you a few  days after your surgery to see how you are doing after your procedure.    MEDICATIONS: Resume taking daily medication.  Take prescribed pain medication with food.  If no medication is prescribed, you may take non-aspirin pain medication if needed.  PAIN MEDICATION CAN BE VERY CONSTIPATING.  Take a stool softener or laxative such as senokot, pericolace, or milk of magnesia if needed.    Prescription given for norco.      If your physician has prescribed pain medication that includes Acetaminophen (Tylenol), do not take additional Acetaminophen (Tylenol) while taking the prescribed medication.    Depression / Suicide Risk    As you are discharged from this [facility] facility, it is important to learn how to keep safe from harming yourself.    Recognize the warning signs:  · Abrupt changes in personality, positive or negative- including increase in energy   · Giving away possessions  · Change in eating patterns- significant weight changes-  positive or negative  · Change in sleeping patterns- unable to sleep or sleeping all the time   · Unwillingness or inability to communicate  · Depression  · Unusual sadness, discouragement and loneliness  · Talk of wanting to die  · Neglect of personal appearance   · Rebelliousness- reckless behavior  · Withdrawal from people/activities they love  · Confusion- inability to concentrate     If you or a loved one observes any of these behaviors or has concerns about self-harm, here's what you can do:  · Talk about it- your feelings and reasons for harming yourself  · Remove any means that you might use to hurt yourself (examples: pills, rope, extension cords, firearm)  · Get professional help from the community (Mental Health, Substance Abuse, psychological counseling)  · Do not be alone:Call your Safe Contact- someone whom you trust who will be there for you.  · Call your local CRISIS HOTLINE 272-0981 or 626-507-8733  · Call your local Children's Mobile Crisis Response Team  [facility] (401) 969-1807 or www.[employer]  · Call the toll free National Suicide Prevention Hotlines   · National Suicide Prevention Lifeline 633-194-BSLW (6356)  · National Hope Line Network 800-SUICIDE (177-9524)

## 2021-12-16 NOTE — OR NURSING
1304 patient to recovery room from OR  Report received     1310 report to Jefry GUERRERO    1346 Jefry Gave report to phase 2  Awaiting a bed     1355 report received from Jefry GUERRERO    1400 Patient ambulated to the bathroom and was able to urinate     1415 patient resting     1445 patient resting  Still meets criteria for phase 2     1502 patient up to restroom able to urinate no hesitance some burning and pain some blood      1508 patient getting dressed ekg leads removed-  having abdominal spasms     1530 patient doing things on her phone  Waiting for discharge     1541 patient transported to phase 2

## 2021-12-16 NOTE — OP REPORT
Urologic Surgery Operative Report     Pre-op Diagnosis: Left ureterolithiasis   Post-op Diagnosis: Same as above   Procedure: Left ureteroscopy, laser lithotripsy, stone basketing, stent placement   Surgeon: Surgeon(s) and Role:     * Ivan Carpenter M.D. - Primary   Assistant: Circulator: Ree Tavera R.N.  Laser Staff: Cali Kahn Circulator: Ashlie Astorga R.N.  Scrub Person: Deneen Zaman  Radiology Technologist: Faraz Rosen   Anesthesia: General,   Anesthesiologist: Scot Verde M.D.   Estimated Blood Loss: * No blood loss amount entered *   IV fluids <1L Crystalloid    Specimens: 1. Stone for chemical analysis    Complications: None   Condition: Stable, procedure well tolerated    Drains: 1. 0Lf50cl, JJ stent((on strings) )  2. No rivera   Disposition:  1. PACU, discharge after voiding  2. f/u 5-7 days for stent removal   Findings 1. 1.5 cm stone at Left UVJ  2. 5mm stone LLP      Indication:    After a full discussion of alternatives, risks, and benefits the patient consented to proceeding with Ureteroscopy, laser lithotripsy and possible JJ stent placement on the respective side.  She understands the risk of inability to access ureter, the need for second procedures, the possibility of negative ureteroscopy, that she may have stent discomfort until this is removed, bleeding, infection, ureteral injury or stricture, bladder injury, post op urinary retention requiring rivera catheter, and the general pulmonary and cardiovascular risks associated with anesthesia.     Procedure Details:   The patient was taken to operating room and placed on table in supine position.  ancef was administered prior to the start of the procedure based on previous urine cultures. Sequential compression devices were placed for deep venous thrombosis prophylaxis. After induction of general anesthesia, both legs were placed in Zac stirrups in the standard lithotomy position.  A timeout was held confirming  the correct patient, procedure and laterality.   The perineal area was prepped and draped in a sterile fashion. A rigid cystoscope was inserted into the urethra and the bladder was emptied and then distended with sterile saline. Urethral sounds were not needed to dilate the urethral meatus. Cystoscopy revealed normal bladder mucosa, orthotopic ureteral orifices, and no abnormalities.    We passed a wire through the ureteral orifice of the respective side into the renal pelvis which was visualized under fluoroscopic vision.  A dual lumen advanced over the wire and a second wire was placed.      IF FLEXIBLE ONLY:  A [id number] ureteral access sheath was then placed over one of the  wires to desired position in left ureter, and wire was removed.  We inserted the flexible ureteroscope and identified the large stone in the midpole  . The holmium laser was then used to fracture the stone into minute fragments <1mm aside from a 4mm fragment that was grasped with a 0 tip nitinol basket and removed as was the lower pole stone. On final panpyeloscopy no large stones were noted however there was a significant amount of debris that remained in the midpole.  We removed the ureteroscope slowly with the ureteral access sheath flushing out any remaining ureteral stone debris.     Returnign to rigid cystoscope, we then placed a L-sided JJ stent, [id number], JJ stent (on strings)  under fluoroscopy. We then saw that the JJ stent was in appropriate position, with a good curl visualized in the renal pelvis fluoroscopically and a good curl in the bladder endoscopically. Stones sent for analysis. The cystoscope was removed after emptying the bladder.  The patient was awoken from anesthesia and brought to recovery in satisfactory condition.        Ivan Carpenter M.D.   [address] [place] [address]   [phone]

## 2021-12-16 NOTE — OR NURSING
Patient allergies and NPO status verified, home medication reconciliation completed and belongings secured. Patient verbalizes understanding of pain scale, expected course of stay and plan of care. Surgical site verified with patient. IV access established. Sequentials placed on legs. Oral and nasal triple aim completed.

## 2021-12-16 NOTE — ANESTHESIA TIME REPORT
Anesthesia Start and Stop Event Times     Date Time Event    12/16/2021 1150 Ready for Procedure     1210 Anesthesia Start     1305 Anesthesia Stop        Responsible Staff  12/16/21    Name Role Begin End    Scot Verde M.D. Anesth 1210 1305        Preop Diagnosis (Free Text):  Pre-op Diagnosis     URETERIC STONE        Preop Diagnosis (Codes):    Premium Reason  Non-Premium    Comments:

## 2021-12-16 NOTE — ANESTHESIA PROCEDURE NOTES
Airway    Date/Time: 12/16/2021 12:14 PM  Performed by: Scot Verde M.D.  Authorized by: Scot Verde M.D.     Location:  OR  Urgency:  Elective  Indications for Airway Management:  Anesthesia      Spontaneous Ventilation: absent    Sedation Level:  Deep  Preoxygenated: Yes    Mask Difficulty Assessment:  0 - not attempted  Final Airway Type:  Supraglottic airway  Final Supraglottic Airway:  Standard LMA    SGA Size:  4  Number of Attempts at Approach:  1

## 2021-12-16 NOTE — OR NURSING
1320-received report from Laura GUERRERO. Pt awake alert, denies any pain. She does report from feeling of urgency at this time. O2 removed and on RA     1340-called and updated patients mom  At this time       1355-report back to Laura GUERRERO

## 2021-12-16 NOTE — ANESTHESIA POSTPROCEDURE EVALUATION
Patient: Any Schwarz    Procedure Summary     Date: 12/16/21 Room / Location: [place] [address] / SURGERY [facility]    Anesthesia Start: 1210 Anesthesia Stop: 1305    Procedures:       CYSTOSCOPY, WITH URETERAL STENT INSERTION (Left Ureter)      URETEROSCOPY (Left Ureter)      LITHOTRIPSY, USING LASER (Left Ureter) Diagnosis: (left ureteral stone)    Surgeons: Ivan Carpenter M.D. Responsible Provider: Scot Verde M.D.    Anesthesia Type: general ASA Status: 2          Final Anesthesia Type: general  Last vitals  BP   Blood Pressure: 119/54    Temp   36.9 °C (98.4 °F)    Pulse   67   Resp   (!) 10    SpO2   92 %      Anesthesia Post Evaluation    Patient location during evaluation: PACU  Patient participation: complete - patient participated  Level of consciousness: awake and alert    Airway patency: patent  Anesthetic complications: no  Cardiovascular status: hemodynamically stable  Respiratory status: acceptable  Hydration status: euvolemic    PONV: none          No complications documented.     Nurse Pain Score: 0 (NPRS)

## 2021-12-16 NOTE — INTERVAL H&P NOTE
Patient seen and evaluated. No new complaints. Exam unchanged. Will proceed with planned procedure as scheduled.     Short form in chart

## 2021-12-16 NOTE — ANESTHESIA PREPROCEDURE EVALUATION
Case: [id number] Date/Time: 12/16/21 1145    Procedures:       CYSTOSCOPY, WITH URETERAL STENT INSERTION (Left )      URETEROSCOPY      LITHOTRIPSY, USING LASER    Pre-op diagnosis: URETERIC STONE    Location: [facility] OR 01 / SURGERY [facility]    Surgeons: Ivan Carpenter M.D.        Brief Past Medical History    Anesthesia: +PONV  Cards: No History of CHF, CAD, or Stents. > 4 METS.  Resp: No Asthma or COPD  GI: No Daily Symptomatic GERD. NPO per guidelines.  Neuro: No History of CVA , TIA, or Seizures.   Endo: No History of Diabetes  Renal: No active problems  MSK: No active problems    Relevant Problems   GI   (positive) Gastroesophageal reflux disease without esophagitis       Physical Exam    Airway   Mallampati: II  TM distance: >3 FB  Neck ROM: full       Cardiovascular - normal exam  Rhythm: regular  Rate: normal  (-) murmur     Dental - normal exam           Pulmonary - normal exam  Breath sounds clear to auscultation     Abdominal    Neurological - normal exam                 Anesthesia Plan    ASA 2       Plan - general       Airway plan will be LMA          Induction: intravenous    Postoperative Plan: Postoperative administration of opioids is intended.    Pertinent diagnostic labs and testing reviewed    Informed Consent:    Anesthetic plan and risks discussed with patient.    Use of blood products discussed with: patient whom consented to blood products.

## 2021-12-21 LAB
APPEARANCE STONE: NORMAL
COMPN STONE: NORMAL
SPECIMEN WT: 23 MG

## 2021-12-23 ENCOUNTER — HOSPITAL ENCOUNTER (OUTPATIENT)
Facility: MEDICAL CENTER | Age: 51
End: 2021-12-23
Attending: UROLOGY
Payer: COMMERCIAL

## 2021-12-23 PROCEDURE — 87086 URINE CULTURE/COLONY COUNT: CPT

## 2021-12-26 LAB
BACTERIA UR CULT: NORMAL
SIGNIFICANT IND 70042: NORMAL
SITE SITE: NORMAL
SOURCE SOURCE: NORMAL

## 2022-01-27 ENCOUNTER — HOSPITAL ENCOUNTER (OUTPATIENT)
Dept: RADIOLOGY | Facility: MEDICAL CENTER | Age: 52
End: 2022-01-27
Attending: UROLOGY
Payer: COMMERCIAL

## 2022-01-27 DIAGNOSIS — N20.0 CALCULUS, KIDNEY: ICD-10-CM

## 2022-01-27 PROCEDURE — 74018 RADEX ABDOMEN 1 VIEW: CPT

## 2022-03-12 DIAGNOSIS — E78.2 MIXED HYPERLIPIDEMIA: ICD-10-CM

## 2022-03-12 DIAGNOSIS — F41.9 ANXIETY AND DEPRESSION: ICD-10-CM

## 2022-03-12 DIAGNOSIS — F32.A ANXIETY AND DEPRESSION: ICD-10-CM

## 2022-03-14 RX ORDER — CITALOPRAM 20 MG/1
TABLET ORAL
Qty: 90 TABLET | Refills: 0 | Status: SHIPPED | OUTPATIENT
Start: 2022-03-14 | End: 2022-08-03 | Stop reason: SDUPTHER

## 2022-03-14 RX ORDER — COLESEVELAM 180 1/1
TABLET ORAL
Qty: 180 TABLET | Refills: 0 | Status: SHIPPED | OUTPATIENT
Start: 2022-03-14 | End: 2022-05-24

## 2022-03-25 ENCOUNTER — TELEPHONE (OUTPATIENT)
Dept: SCHEDULING | Facility: IMAGING CENTER | Age: 52
End: 2022-03-25

## 2022-05-09 SDOH — ECONOMIC STABILITY: TRANSPORTATION INSECURITY
IN THE PAST 12 MONTHS, HAS THE LACK OF TRANSPORTATION KEPT YOU FROM MEDICAL APPOINTMENTS OR FROM GETTING MEDICATIONS?: NO

## 2022-05-09 SDOH — ECONOMIC STABILITY: HOUSING INSECURITY: IN THE LAST 12 MONTHS, HOW MANY PLACES HAVE YOU LIVED?: 1

## 2022-05-09 SDOH — ECONOMIC STABILITY: TRANSPORTATION INSECURITY
IN THE PAST 12 MONTHS, HAS LACK OF TRANSPORTATION KEPT YOU FROM MEETINGS, WORK, OR FROM GETTING THINGS NEEDED FOR DAILY LIVING?: NO

## 2022-05-09 SDOH — ECONOMIC STABILITY: INCOME INSECURITY: HOW HARD IS IT FOR YOU TO PAY FOR THE VERY BASICS LIKE FOOD, HOUSING, MEDICAL CARE, AND HEATING?: NOT VERY HARD

## 2022-05-09 SDOH — ECONOMIC STABILITY: HOUSING INSECURITY
IN THE LAST 12 MONTHS, WAS THERE A TIME WHEN YOU DID NOT HAVE A STEADY PLACE TO SLEEP OR SLEPT IN A SHELTER (INCLUDING NOW)?: NO

## 2022-05-09 SDOH — HEALTH STABILITY: PHYSICAL HEALTH: ON AVERAGE, HOW MANY DAYS PER WEEK DO YOU ENGAGE IN MODERATE TO STRENUOUS EXERCISE (LIKE A BRISK WALK)?: 0 DAYS

## 2022-05-09 SDOH — ECONOMIC STABILITY: FOOD INSECURITY: WITHIN THE PAST 12 MONTHS, THE FOOD YOU BOUGHT JUST DIDN'T LAST AND YOU DIDN'T HAVE MONEY TO GET MORE.: NEVER TRUE

## 2022-05-09 SDOH — HEALTH STABILITY: PHYSICAL HEALTH: ON AVERAGE, HOW MANY MINUTES DO YOU ENGAGE IN EXERCISE AT THIS LEVEL?: 0 MIN

## 2022-05-09 SDOH — ECONOMIC STABILITY: INCOME INSECURITY: IN THE LAST 12 MONTHS, WAS THERE A TIME WHEN YOU WERE NOT ABLE TO PAY THE MORTGAGE OR RENT ON TIME?: NO

## 2022-05-09 SDOH — ECONOMIC STABILITY: TRANSPORTATION INSECURITY
IN THE PAST 12 MONTHS, HAS LACK OF RELIABLE TRANSPORTATION KEPT YOU FROM MEDICAL APPOINTMENTS, MEETINGS, WORK OR FROM GETTING THINGS NEEDED FOR DAILY LIVING?: NO

## 2022-05-09 SDOH — ECONOMIC STABILITY: FOOD INSECURITY: WITHIN THE PAST 12 MONTHS, YOU WORRIED THAT YOUR FOOD WOULD RUN OUT BEFORE YOU GOT MONEY TO BUY MORE.: NEVER TRUE

## 2022-05-09 SDOH — HEALTH STABILITY: MENTAL HEALTH
STRESS IS WHEN SOMEONE FEELS TENSE, NERVOUS, ANXIOUS, OR CAN'T SLEEP AT NIGHT BECAUSE THEIR MIND IS TROUBLED. HOW STRESSED ARE YOU?: RATHER MUCH

## 2022-05-09 ASSESSMENT — SOCIAL DETERMINANTS OF HEALTH (SDOH)
HOW OFTEN DO YOU ATTENT MEETINGS OF THE CLUB OR ORGANIZATION YOU BELONG TO?: MORE THAN 4 TIMES PER YEAR
HOW OFTEN DO YOU ATTENT MEETINGS OF THE CLUB OR ORGANIZATION YOU BELONG TO?: MORE THAN 4 TIMES PER YEAR
IN A TYPICAL WEEK, HOW MANY TIMES DO YOU TALK ON THE PHONE WITH FAMILY, FRIENDS, OR NEIGHBORS?: THREE TIMES A WEEK
HOW OFTEN DO YOU ATTEND CHURCH OR RELIGIOUS SERVICES?: 1 TO 4 TIMES PER YEAR
HOW HARD IS IT FOR YOU TO PAY FOR THE VERY BASICS LIKE FOOD, HOUSING, MEDICAL CARE, AND HEATING?: NOT VERY HARD
HOW OFTEN DO YOU ATTEND CHURCH OR RELIGIOUS SERVICES?: 1 TO 4 TIMES PER YEAR
HOW OFTEN DO YOU HAVE A DRINK CONTAINING ALCOHOL: 2-3 TIMES A WEEK
DO YOU BELONG TO ANY CLUBS OR ORGANIZATIONS SUCH AS CHURCH GROUPS UNIONS, FRATERNAL OR ATHLETIC GROUPS, OR SCHOOL GROUPS?: YES
HOW OFTEN DO YOU HAVE SIX OR MORE DRINKS ON ONE OCCASION: NEVER
HOW OFTEN DO YOU GET TOGETHER WITH FRIENDS OR RELATIVES?: ONCE A WEEK
ARE YOU MARRIED, WIDOWED, DIVORCED, SEPARATED, NEVER MARRIED, OR LIVING WITH A PARTNER?: NEVER MARRIED
WITHIN THE PAST 12 MONTHS, YOU WORRIED THAT YOUR FOOD WOULD RUN OUT BEFORE YOU GOT THE MONEY TO BUY MORE: NEVER TRUE
ARE YOU MARRIED, WIDOWED, DIVORCED, SEPARATED, NEVER MARRIED, OR LIVING WITH A PARTNER?: NEVER MARRIED
DO YOU BELONG TO ANY CLUBS OR ORGANIZATIONS SUCH AS CHURCH GROUPS UNIONS, FRATERNAL OR ATHLETIC GROUPS, OR SCHOOL GROUPS?: YES
HOW OFTEN DO YOU GET TOGETHER WITH FRIENDS OR RELATIVES?: ONCE A WEEK
IN A TYPICAL WEEK, HOW MANY TIMES DO YOU TALK ON THE PHONE WITH FAMILY, FRIENDS, OR NEIGHBORS?: THREE TIMES A WEEK
HOW MANY DRINKS CONTAINING ALCOHOL DO YOU HAVE ON A TYPICAL DAY WHEN YOU ARE DRINKING: 1 OR 2

## 2022-05-09 ASSESSMENT — LIFESTYLE VARIABLES
HOW OFTEN DO YOU HAVE SIX OR MORE DRINKS ON ONE OCCASION: NEVER
HOW MANY STANDARD DRINKS CONTAINING ALCOHOL DO YOU HAVE ON A TYPICAL DAY: 1 OR 2
HOW OFTEN DO YOU HAVE A DRINK CONTAINING ALCOHOL: 2-3 TIMES A WEEK

## 2022-05-10 ENCOUNTER — OFFICE VISIT (OUTPATIENT)
Dept: MEDICAL GROUP | Facility: PHYSICIAN GROUP | Age: 52
End: 2022-05-10
Payer: COMMERCIAL

## 2022-05-10 VITALS
BODY MASS INDEX: 32.99 KG/M2 | HEART RATE: 72 BPM | DIASTOLIC BLOOD PRESSURE: 70 MMHG | TEMPERATURE: 96.3 F | SYSTOLIC BLOOD PRESSURE: 106 MMHG | HEIGHT: 65 IN | OXYGEN SATURATION: 95 % | WEIGHT: 198 LBS

## 2022-05-10 DIAGNOSIS — F41.9 ANXIETY AND DEPRESSION: ICD-10-CM

## 2022-05-10 DIAGNOSIS — F32.A ANXIETY AND DEPRESSION: ICD-10-CM

## 2022-05-10 DIAGNOSIS — Z76.89 ESTABLISHING CARE WITH NEW DOCTOR, ENCOUNTER FOR: ICD-10-CM

## 2022-05-10 DIAGNOSIS — E66.09 CLASS 1 OBESITY DUE TO EXCESS CALORIES WITH SERIOUS COMORBIDITY AND BODY MASS INDEX (BMI) OF 32.0 TO 32.9 IN ADULT: ICD-10-CM

## 2022-05-10 DIAGNOSIS — E78.2 MIXED HYPERLIPIDEMIA: ICD-10-CM

## 2022-05-10 DIAGNOSIS — R73.01 ELEVATED FASTING GLUCOSE: ICD-10-CM

## 2022-05-10 DIAGNOSIS — Z12.31 ENCOUNTER FOR SCREENING MAMMOGRAM FOR BREAST CANCER: ICD-10-CM

## 2022-05-10 DIAGNOSIS — B00.1 COLD SORE: ICD-10-CM

## 2022-05-10 DIAGNOSIS — Z00.00 ROUTINE HEALTH MAINTENANCE: ICD-10-CM

## 2022-05-10 DIAGNOSIS — E55.9 VITAMIN D DEFICIENCY: ICD-10-CM

## 2022-05-10 PROBLEM — E66.811 CLASS 1 OBESITY DUE TO EXCESS CALORIES WITH SERIOUS COMORBIDITY AND BODY MASS INDEX (BMI) OF 32.0 TO 32.9 IN ADULT: Status: ACTIVE | Noted: 2022-05-10

## 2022-05-10 PROCEDURE — 99214 OFFICE O/P EST MOD 30 MIN: CPT | Performed by: NURSE PRACTITIONER

## 2022-05-10 ASSESSMENT — PATIENT HEALTH QUESTIONNAIRE - PHQ9: CLINICAL INTERPRETATION OF PHQ2 SCORE: 0

## 2022-05-10 ASSESSMENT — FIBROSIS 4 INDEX: FIB4 SCORE: 0.79

## 2022-05-10 NOTE — ASSESSMENT & PLAN NOTE
New to examiner, chronic for patient. Continues citalopram 20 mg/day, denies side effects of the medication. Has been taking citalopram for about 10 years. Reports that she tends to lie awake at night and worry about things that do not matter, the medication is helpful for managing this.   Depression Screen (PHQ-2/PHQ-9) 12/19/2016 2/20/2020 5/10/2022   PHQ-2 Total Score - 0 -   PHQ-2 Total Score - - -   PHQ-2 Total Score 0 - 0   PHQ-9 Total Score - 8 -     Interpretation of PHQ-9 Total Score   Score Severity   1-4 No Depression

## 2022-05-10 NOTE — PROGRESS NOTES
CC:   Chief Complaint   Patient presents with   • Establish Care     HISTORY OF THE PRESENT ILLNESS: Patient is a 51 y.o. female. This pleasant patient is here today to establish care and discuss multiple issues as listed below.    Health Maintenance: Reviewed    Mixed hyperlipidemia  New to examiner, chronic for the patient.  She has been tried on 2-3 different statin medications but reports that she always experiences bilateral lower extremity myalgias.  She is currently taking WelChol 625 mg tablets, 3 tablets twice daily, denies side effects of the medication.  Reports that her diet is terrible and she does not exercise.  Due for updated annual labs.    Anxiety and depression  New to examiner, chronic for patient. Continues citalopram 20 mg/day, denies side effects of the medication. Has been taking citalopram for about 10 years. Reports that she tends to lie awake at night and worry about things that do not matter, the medication is helpful for managing this.   Depression Screen (PHQ-2/PHQ-9) 12/19/2016 2/20/2020 5/10/2022   PHQ-2 Total Score - 0 -   PHQ-2 Total Score - - -   PHQ-2 Total Score 0 - 0   PHQ-9 Total Score - 8 -     Interpretation of PHQ-9 Total Score   Score Severity   1-4 No Depression     Cold sore  New to examiner, intermittent problem for the patient.  Patient reports that she had frequent cold sore flares when she was a child, now she only has them about 2-3 times per year.  She was being prescribed valacyclovir for as needed use by dermatology, but is unsure of the dosing, she believes she took 2 pills twice daily for 1 day total.  She is interested in getting a refill, will get back to me through [facility] on what her previous prescription was.    Allergies: Minocycline and Statins [hmg-coa-r inhibitors]  Current Outpatient Medications Ordered in Epic   Medication Sig Dispense Refill   • colesevelam (WELCHOL) 625 MG Tab TAKE THREE TABLETS BY MOUTH TWICE A DAY WITH MEALS 180 Tablet 0   •  citalopram (CELEXA) 20 MG Tab TAKE ONE TABLET BY MOUTH DAILY 90 Tablet 0   • valACYclovir (VALTREX) 500 MG Tab Take 500 mg by mouth 2 times a day.       No current [facility]-ordered facility-administered medications on file.     Past Medical History:   Diagnosis Date   • Anesthesia     PONV   • Anxiety    • Arthritis     elbows and hands   • Cancer (HCC) 1990    melanoma left leg, when 20 yo   • Depression    • Endometriosis 1995   • High cholesterol    • Kidney stone    • Other specified disorder of intestines     IBS   • Other specified symptom associated with female genital organs     heavy bleeding   • Ovarian cyst    • Ovarian cyst 1995   • PONV (postoperative nausea and vomiting)    • Unspecified urinary incontinence      Past Surgical History:   Procedure Laterality Date   • ND CYSTOSCOPY,INSERT URETERAL STENT Left 12/16/2021    Procedure: CYSTOSCOPY, WITH URETERAL STENT INSERTION;  Surgeon: Ivan Carpenter M.D.;  Location: [facility];  Service: Urology   • ND CYSTO/URETERO/PYELOSCOPY, DX Left 12/16/2021    Procedure: URETEROSCOPY;  Surgeon: Ivan Carpenter M.D.;  Location: [facility];  Service: Urology   • LASERTRIPSY Left 12/16/2021    Procedure: LITHOTRIPSY, USING LASER;  Surgeon: Ivan Carpenter M.D.;  Location: [facility];  Service: Urology   • ABDOMINAL HYSTERECTOMY TOTAL  6/24/2014    Performed by Hernandez Kilaptrick M.D. at SURGERY SAME DAY [facility] ORS   • HYSTEROSCOPY WITH VIDEO OPERATIVE  11/25/2013    Performed by Hernandez Kilpatrick M.D. at SURGERY SURGICAL [facility]     Social History     Tobacco Use   • Smoking status: Never Smoker   • Smokeless tobacco: Never Used   Vaping Use   • Vaping Use: Never used   Substance Use Topics   • Alcohol use: Yes     Alcohol/week: 4.2 oz     Types: 7 Cans of beer per week   • Drug use: No     Comment: No, but uses CBD oil on shoulder.      Social History     Social History Narrative   • Not on file     Family History   Problem  "Relation Age of Onset   • Hypertension Mother    • Cancer Father         prostate   • Prostate cancer Father    • No Known Problems Sister    • No Known Problems Brother    • Alzheimer's Disease Maternal Aunt    • Diabetes Maternal Aunt    • Diabetes Maternal Grandmother    • Diabetes Paternal Grandfather    • Alzheimer's Disease Paternal Grandmother    • Diabetes Maternal Uncle    • Diabetes Paternal Aunt    • Diabetes Paternal Uncle    • Heart Attack Maternal Grandfather    • Cancer Maternal Grandfather         not sure what type     ROS:   Constitutional: No fevers, chills, malaise/fatigue.  Eyes: No eye pain.  ENT: No sore throat, congestion.   Resp: No cough, shortness of breath.  CV: No chest pain, leg swelling, palpitations.  GI: No nausea/vomiting, abdominal pain, constipation, diarrhea.  : No dysuria, hematuria.  MSK: No weakness.  Skin: No rashes.  Neuro: No dizziness, weakness, headaches.  Psych: No suicidal ideations.    All remaining systems reviewed and found to be negative, except as stated above.         Exam: /70 (BP Location: Left arm, Patient Position: Sitting, BP Cuff Size: Adult)   Pulse 72   Temp (!) 35.7 °C (96.3 °F) (Temporal)   Ht 1.651 m (5' 5\")   Wt 89.8 kg (198 lb)   SpO2 95%  Body mass index is 32.95 kg/m².    General: Normal appearing. No distress.  HEENT: Normocephalic. Eyes conjunctiva clear lids without ptosis, pupils equal and reactive to light accommodation, ears normal shape and contour, canals are clear bilaterally, tympanic membranes are benign, nasal mucosa benign, oropharynx is without erythema, edema or exudates. Sinuses (frontal and maxillary) nontender to palpation.  Neck: Supple without JVD or bruit.  Pulmonary: Clear to ausculation.  Normal effort. No rales, rhonchi, or wheezing.  Cardiovascular Regular rate and rhythm without murmur. Carotid and radial pulses are intact and equal bilaterally.  Abdomen: Soft, nontender, nondistended. Normal bowel " sounds.  Neurologic: Grossly nonfocal.  Lymph: No cervical or supraclavicular lymph nodes are palpable.  Skin: Warm and dry.  No obvious lesions.  Musculoskeletal: Normal gait. No extremity cyanosis, clubbing, or edema.  Psych:  Normal mood and affect. Alert and oriented x3. Judgment and insight is normal.     Assessment/Plan:  1. Establishing care with new doctor, encounter for    2. Anxiety and depression  New to examiner, chronic problem for the patient.  Continue citalopram 20 mg once daily, does not need a refill at this time. Due for updated annual labs prior to follow-up.  - TSH WITH REFLEX TO FT4; Future    3. Mixed hyperlipidemia  New to examiner, chronic problem for the patient.  Continue WelChol 625 mg tablet, 3 tablets twice daily, does not need refill at this time. Due for updated annual labs prior to follow-up.  - Comp Metabolic Panel; Future  - Lipid Profile; Future  - TSH WITH REFLEX TO FT4; Future    4. Class 1 obesity due to excess calories with serious comorbidity and body mass index (BMI) of 32.0 to 32.9 in adult  Due for updated annual labs prior to follow-up.  - Comp Metabolic Panel; Future  - Lipid Profile; Future  - TSH WITH REFLEX TO FT4; Future    5. Elevated fasting glucose  Due for updated annual labs prior to follow-up.  - Comp Metabolic Panel; Future    6. Vitamin D deficiency  Due for updated annual labs prior to follow-up.  - VITAMIN D,25 HYDROXY; Future    7. Cold sore  New to examiner, intermittent problem for the patient.  Patient will message through [facility] of dosing of valacyclovir for medication refill. Due for updated annual labs prior to follow-up.  - TSH WITH REFLEX TO FT4; Future    8. Encounter for screening mammogram for breast cancer  Due for screening.  - MA-SCREENING MAMMO BILAT W/TOMOSYNTHESIS W/CAD; Future    9. Routine health maintenance  Due for updated annual labs prior to follow-up.  - Comp Metabolic Panel; Future  - Lipid Profile; Future  - TSH WITH REFLEX TO  FT4; Future  - VITAMIN D,25 HYDROXY; Future     Educated in proper administration of medication(s) ordered today including safety, possible SE, risks, benefits, rationale and alternatives to therapy.   Supportive care, differential diagnoses, and indications for immediate follow-up discussed with patient.    Pathogenesis of diagnosis discussed including typical length and natural progression.    Instructed to return to clinic or nearest emergency department for any change in condition, further concerns, or worsening of symptoms.  Patient states understanding of the plan of care and discharge instructions.    Return in 2 weeks (on 5/24/2022) for Virtual Visit, Follow up Labs.    Please note that this dictation was created using voice recognition software. I have made every reasonable attempt to correct obvious errors, but I expect that there are errors of grammar and possibly content that I did not discover before finalizing the note.

## 2022-05-10 NOTE — ASSESSMENT & PLAN NOTE
New to examiner, chronic for the patient.  She has been tried on 2-3 different statin medications but reports that she always experiences bilateral lower extremity myalgias.  She is currently taking WelChol 625 mg tablets, 3 tablets twice daily, denies side effects of the medication.  Reports that her diet is terrible and she does not exercise.  Due for updated annual labs.

## 2022-05-10 NOTE — ASSESSMENT & PLAN NOTE
New to examiner, intermittent problem for the patient.  Patient reports that she had frequent cold sore flares when she was a child, now she only has them about 2-3 times per year.  She was being prescribed valacyclovir for as needed use by dermatology, but is unsure of the dosing, she believes she took 2 pills twice daily for 1 day total.  She is interested in getting a refill, will get back to me through [facility] on what her previous prescription was.

## 2022-05-11 ENCOUNTER — PATIENT MESSAGE (OUTPATIENT)
Dept: MEDICAL GROUP | Facility: PHYSICIAN GROUP | Age: 52
End: 2022-05-11
Payer: COMMERCIAL

## 2022-05-11 DIAGNOSIS — B00.1 COLD SORE: ICD-10-CM

## 2022-05-11 RX ORDER — VALACYCLOVIR HYDROCHLORIDE 1 G/1
2000 TABLET, FILM COATED ORAL EVERY 12 HOURS
COMMUNITY
End: 2022-05-11 | Stop reason: SDUPTHER

## 2022-05-11 RX ORDER — VALACYCLOVIR HYDROCHLORIDE 1 G/1
2000 TABLET, FILM COATED ORAL EVERY 12 HOURS
Qty: 12 TABLET | Refills: 3 | Status: SHIPPED | OUTPATIENT
Start: 2022-05-11 | End: 2022-05-12

## 2022-05-12 NOTE — TELEPHONE ENCOUNTER
Requested Prescriptions     Pending Prescriptions Disp Refills   • valacyclovir (VALTREX) 1 GM Tab 12 Tablet 3     Sig: Take 2 Tablets by mouth every 12 hours for 1 day. As needed for cold sore flare       Deandra Chavez A.P.R.N.

## 2022-05-24 DIAGNOSIS — E78.2 MIXED HYPERLIPIDEMIA: ICD-10-CM

## 2022-05-24 RX ORDER — COLESEVELAM 180 1/1
TABLET ORAL
Qty: 180 TABLET | Refills: 0 | Status: SHIPPED | OUTPATIENT
Start: 2022-05-24 | End: 2022-08-03 | Stop reason: SDUPTHER

## 2022-05-25 NOTE — TELEPHONE ENCOUNTER
Requested Prescriptions     Pending Prescriptions Disp Refills   • colesevelam (WELCHOL) 625 MG Tab [Pharmacy Med Name: COLESEVELAM 625 MG TABLET] 180 Tablet 0     Sig: TAKE THREE TABLETS BY MOUTH TWICE A DAY WITH MEALS       MIKE Bryant.

## 2022-07-27 ENCOUNTER — HOSPITAL ENCOUNTER (OUTPATIENT)
Dept: LAB | Facility: MEDICAL CENTER | Age: 52
End: 2022-07-27
Attending: NURSE PRACTITIONER
Payer: COMMERCIAL

## 2022-07-27 DIAGNOSIS — E55.9 VITAMIN D DEFICIENCY: ICD-10-CM

## 2022-07-27 DIAGNOSIS — E78.2 MIXED HYPERLIPIDEMIA: ICD-10-CM

## 2022-07-27 DIAGNOSIS — B00.1 COLD SORE: ICD-10-CM

## 2022-07-27 DIAGNOSIS — E66.09 CLASS 1 OBESITY DUE TO EXCESS CALORIES WITH SERIOUS COMORBIDITY AND BODY MASS INDEX (BMI) OF 32.0 TO 32.9 IN ADULT: ICD-10-CM

## 2022-07-27 DIAGNOSIS — F32.A ANXIETY AND DEPRESSION: ICD-10-CM

## 2022-07-27 DIAGNOSIS — F41.9 ANXIETY AND DEPRESSION: ICD-10-CM

## 2022-07-27 DIAGNOSIS — Z00.00 ROUTINE HEALTH MAINTENANCE: ICD-10-CM

## 2022-07-27 DIAGNOSIS — R73.01 ELEVATED FASTING GLUCOSE: ICD-10-CM

## 2022-07-27 LAB
ALBUMIN SERPL BCP-MCNC: 4.1 G/DL (ref 3.2–4.9)
ALBUMIN/GLOB SERPL: 1.5 G/DL
ALP SERPL-CCNC: 87 U/L (ref 30–99)
ALT SERPL-CCNC: 52 U/L (ref 2–50)
ANION GAP SERPL CALC-SCNC: 11 MMOL/L (ref 7–16)
AST SERPL-CCNC: 34 U/L (ref 12–45)
BILIRUB SERPL-MCNC: 0.4 MG/DL (ref 0.1–1.5)
BUN SERPL-MCNC: 15 MG/DL (ref 8–22)
CALCIUM SERPL-MCNC: 9.6 MG/DL (ref 8.5–10.5)
CHLORIDE SERPL-SCNC: 106 MMOL/L (ref 96–112)
CHOLEST SERPL-MCNC: 199 MG/DL (ref 100–199)
CO2 SERPL-SCNC: 22 MMOL/L (ref 20–33)
CREAT SERPL-MCNC: 0.83 MG/DL (ref 0.5–1.4)
FASTING STATUS PATIENT QL REPORTED: NORMAL
GFR SERPLBLD CREATININE-BSD FMLA CKD-EPI: 84 ML/MIN/1.73 M 2
GLOBULIN SER CALC-MCNC: 2.8 G/DL (ref 1.9–3.5)
GLUCOSE SERPL-MCNC: 88 MG/DL (ref 65–99)
HDLC SERPL-MCNC: 52 MG/DL
LDLC SERPL CALC-MCNC: 129 MG/DL
POTASSIUM SERPL-SCNC: 4.5 MMOL/L (ref 3.6–5.5)
PROT SERPL-MCNC: 6.9 G/DL (ref 6–8.2)
SODIUM SERPL-SCNC: 139 MMOL/L (ref 135–145)
TRIGL SERPL-MCNC: 89 MG/DL (ref 0–149)

## 2022-07-27 PROCEDURE — 82306 VITAMIN D 25 HYDROXY: CPT

## 2022-07-27 PROCEDURE — 80053 COMPREHEN METABOLIC PANEL: CPT

## 2022-07-27 PROCEDURE — 80061 LIPID PANEL: CPT

## 2022-07-27 PROCEDURE — 36415 COLL VENOUS BLD VENIPUNCTURE: CPT

## 2022-07-27 PROCEDURE — 84443 ASSAY THYROID STIM HORMONE: CPT

## 2022-07-28 ENCOUNTER — APPOINTMENT (RX ONLY)
Dept: URBAN - METROPOLITAN AREA CLINIC 22 | Facility: CLINIC | Age: 52
Setting detail: DERMATOLOGY
End: 2022-07-28

## 2022-07-28 DIAGNOSIS — D22 MELANOCYTIC NEVI: ICD-10-CM

## 2022-07-28 DIAGNOSIS — D18.0 HEMANGIOMA: ICD-10-CM

## 2022-07-28 DIAGNOSIS — L82.1 OTHER SEBORRHEIC KERATOSIS: ICD-10-CM

## 2022-07-28 DIAGNOSIS — Z85.820 PERSONAL HISTORY OF MALIGNANT MELANOMA OF SKIN: ICD-10-CM

## 2022-07-28 DIAGNOSIS — L81.4 OTHER MELANIN HYPERPIGMENTATION: ICD-10-CM

## 2022-07-28 DIAGNOSIS — Z71.89 OTHER SPECIFIED COUNSELING: ICD-10-CM

## 2022-07-28 PROBLEM — D18.01 HEMANGIOMA OF SKIN AND SUBCUTANEOUS TISSUE: Status: ACTIVE | Noted: 2022-07-28

## 2022-07-28 PROBLEM — L64.9 ANDROGENIC ALOPECIA, UNSPECIFIED: Status: ACTIVE | Noted: 2022-07-28

## 2022-07-28 PROBLEM — D22.5 MELANOCYTIC NEVI OF TRUNK: Status: ACTIVE | Noted: 2022-07-28

## 2022-07-28 LAB
25(OH)D3 SERPL-MCNC: 34 NG/ML (ref 30–100)
TSH SERPL DL<=0.005 MIU/L-ACNC: 1.35 UIU/ML (ref 0.38–5.33)

## 2022-07-28 PROCEDURE — ? SUNSCREEN RECOMMENDATIONS

## 2022-07-28 PROCEDURE — ? COUNSELING

## 2022-07-28 PROCEDURE — 99213 OFFICE O/P EST LOW 20 MIN: CPT

## 2022-07-28 PROCEDURE — ? ADDITIONAL NOTES

## 2022-07-28 PROCEDURE — ? PRESCRIPTION

## 2022-07-28 RX ORDER — MINOXIDIL/FINASTERIDE 7 %-0.1 %
1 SOLUTION, NON-ORAL TOPICAL QHS
Qty: 60 | Refills: 5 | Status: ERX

## 2022-07-28 ASSESSMENT — LOCATION DETAILED DESCRIPTION DERM
LOCATION DETAILED: LEFT PROXIMAL PRETIBIAL REGION
LOCATION DETAILED: RIGHT MID-UPPER BACK
LOCATION DETAILED: LEFT PROXIMAL DORSAL FOREARM
LOCATION DETAILED: LEFT LATERAL ABDOMEN
LOCATION DETAILED: LEFT INFERIOR UPPER BACK

## 2022-07-28 ASSESSMENT — LOCATION ZONE DERM
LOCATION ZONE: LEG
LOCATION ZONE: ARM
LOCATION ZONE: TRUNK

## 2022-07-28 ASSESSMENT — LOCATION SIMPLE DESCRIPTION DERM
LOCATION SIMPLE: LEFT FOREARM
LOCATION SIMPLE: LEFT UPPER BACK
LOCATION SIMPLE: LEFT PRETIBIAL REGION
LOCATION SIMPLE: ABDOMEN
LOCATION SIMPLE: RIGHT UPPER BACK

## 2022-07-28 NOTE — PROCEDURE: ADDITIONAL NOTES
Detail Level: Simple
Additional Notes: Has started to slowly notice improvement with the Finapod.   She will continue for now, new refills sent.
Render Risk Assessment In Note?: no

## 2022-07-28 NOTE — PROCEDURE: COUNSELING
Quality 137: Melanoma: Continuity Of Care - Recall System: Patient information entered into a recall system that includes: target date for the next exam specified AND a process to follow up with patients regarding missed or unscheduled appointments
Quality 224: Stage 0-Iic Melanoma: Overutilization Of Imaging Studies For Only Stage 0-Iic Melanoma: None of the following diagnostic imaging studies ordered: chest X-ray, CT, Ultrasound, MRI, PET, or nuclear medicine scans (ML)
Detail Level: Detailed
When Should The Patient Follow-Up For Their Next Full-Body Skin Exam?: 1 Year
Detail Level: Generalized
Detail Level: Zone

## 2022-08-02 ENCOUNTER — PATIENT MESSAGE (OUTPATIENT)
Dept: MEDICAL GROUP | Facility: PHYSICIAN GROUP | Age: 52
End: 2022-08-02
Payer: COMMERCIAL

## 2022-08-02 DIAGNOSIS — E78.2 MIXED HYPERLIPIDEMIA: ICD-10-CM

## 2022-08-02 DIAGNOSIS — F32.A ANXIETY AND DEPRESSION: ICD-10-CM

## 2022-08-02 DIAGNOSIS — F41.9 ANXIETY AND DEPRESSION: ICD-10-CM

## 2022-08-03 DIAGNOSIS — F32.A ANXIETY AND DEPRESSION: ICD-10-CM

## 2022-08-03 DIAGNOSIS — E78.2 MIXED HYPERLIPIDEMIA: ICD-10-CM

## 2022-08-03 DIAGNOSIS — F41.9 ANXIETY AND DEPRESSION: ICD-10-CM

## 2022-08-03 RX ORDER — COLESEVELAM 180 1/1
TABLET ORAL
Qty: 180 TABLET | Refills: 0 | OUTPATIENT
Start: 2022-08-03

## 2022-08-03 RX ORDER — CITALOPRAM 20 MG/1
20 TABLET ORAL DAILY
Qty: 90 TABLET | Refills: 0 | Status: SHIPPED | OUTPATIENT
Start: 2022-08-03 | End: 2022-09-13 | Stop reason: SDUPTHER

## 2022-08-03 RX ORDER — CITALOPRAM 20 MG/1
20 TABLET ORAL DAILY
Qty: 90 TABLET | Refills: 0
Start: 2022-08-03

## 2022-08-03 RX ORDER — COLESEVELAM 180 1/1
TABLET ORAL
Qty: 180 TABLET | Refills: 0 | Status: SHIPPED | OUTPATIENT
Start: 2022-08-03 | End: 2022-09-13 | Stop reason: SDUPTHER

## 2022-08-03 NOTE — TELEPHONE ENCOUNTER
Requested Prescriptions     Signed Prescriptions Disp Refills   • citalopram (CELEXA) 20 MG Tab 90 Tablet 0     Sig: Take 1 Tablet by mouth every day.     Authorizing Provider: NALINI PONCE A.P.R.N.

## 2022-08-03 NOTE — TELEPHONE ENCOUNTER
Requested Prescriptions     Signed Prescriptions Disp Refills   • colesevelam (WELCHOL) 625 MG Tab 180 Tablet 0     Sig: TAKE THREE TABLETS BY MOUTH TWICE A DAY WITH MEALS     Authorizing Provider: NALINI PONCE A.P.R.N.

## 2022-08-03 NOTE — PROGRESS NOTES
Requested Prescriptions     Refused Prescriptions Disp Refills   • citalopram (CELEXA) 20 MG Tab 90 Tablet 0     Sig: Take 1 Tablet by mouth every day.     Refused By: NALINI PONCE     Reason for Refusal: Request already responded to by other means (e.g. phone or fax)     JENNIFER Rosas

## 2022-08-03 NOTE — TELEPHONE ENCOUNTER
Requested Prescriptions     Refused Prescriptions Disp Refills   • colesevelam (WELCHOL) 625 MG Tab 180 Tablet 0     Refused By: NALINI PONCE     Reason for Refusal: Request already responded to by other means (e.g. phone or fax)     MIKE Rosas.

## 2022-08-11 ENCOUNTER — HOSPITAL ENCOUNTER (OUTPATIENT)
Dept: RADIOLOGY | Facility: MEDICAL CENTER | Age: 52
End: 2022-08-11
Attending: UROLOGY
Payer: COMMERCIAL

## 2022-08-11 DIAGNOSIS — N20.1 CALCULUS OF URETER: ICD-10-CM

## 2022-08-11 PROCEDURE — 74178 CT ABD&PLV WO CNTR FLWD CNTR: CPT

## 2022-08-11 PROCEDURE — 700117 HCHG RX CONTRAST REV CODE 255: Performed by: UROLOGY

## 2022-08-11 RX ADMIN — IOHEXOL 100 ML: 350 INJECTION, SOLUTION INTRAVENOUS at 09:24

## 2022-08-12 ENCOUNTER — HOSPITAL ENCOUNTER (OUTPATIENT)
Dept: RADIOLOGY | Facility: MEDICAL CENTER | Age: 52
End: 2022-08-12
Attending: NURSE PRACTITIONER
Payer: COMMERCIAL

## 2022-08-12 DIAGNOSIS — Z12.31 ENCOUNTER FOR SCREENING MAMMOGRAM FOR BREAST CANCER: ICD-10-CM

## 2022-08-12 PROCEDURE — 77063 BREAST TOMOSYNTHESIS BI: CPT

## 2022-09-13 ENCOUNTER — TELEMEDICINE (OUTPATIENT)
Dept: MEDICAL GROUP | Facility: PHYSICIAN GROUP | Age: 52
End: 2022-09-13
Payer: COMMERCIAL

## 2022-09-13 VITALS — WEIGHT: 198 LBS | HEIGHT: 65 IN | BODY MASS INDEX: 32.99 KG/M2 | RESPIRATION RATE: 15 BRPM

## 2022-09-13 DIAGNOSIS — E66.09 CLASS 1 OBESITY DUE TO EXCESS CALORIES WITH SERIOUS COMORBIDITY AND BODY MASS INDEX (BMI) OF 32.0 TO 32.9 IN ADULT: ICD-10-CM

## 2022-09-13 DIAGNOSIS — K21.9 GASTROESOPHAGEAL REFLUX DISEASE WITHOUT ESOPHAGITIS: ICD-10-CM

## 2022-09-13 DIAGNOSIS — F41.9 ANXIETY AND DEPRESSION: ICD-10-CM

## 2022-09-13 DIAGNOSIS — F32.A ANXIETY AND DEPRESSION: ICD-10-CM

## 2022-09-13 DIAGNOSIS — E78.2 MIXED HYPERLIPIDEMIA: ICD-10-CM

## 2022-09-13 DIAGNOSIS — E55.9 VITAMIN D DEFICIENCY: ICD-10-CM

## 2022-09-13 DIAGNOSIS — Z00.00 ROUTINE HEALTH MAINTENANCE: ICD-10-CM

## 2022-09-13 DIAGNOSIS — R73.01 ELEVATED FASTING GLUCOSE: ICD-10-CM

## 2022-09-13 PROCEDURE — 99214 OFFICE O/P EST MOD 30 MIN: CPT | Mod: 95 | Performed by: NURSE PRACTITIONER

## 2022-09-13 RX ORDER — BIOTIN 1 MG
1000 TABLET ORAL DAILY
COMMUNITY
End: 2024-02-20

## 2022-09-13 RX ORDER — CITALOPRAM 20 MG/1
20 TABLET ORAL DAILY
Qty: 90 TABLET | Refills: 3 | Status: SHIPPED | OUTPATIENT
Start: 2022-09-13 | End: 2023-11-02

## 2022-09-13 RX ORDER — COLESEVELAM 180 1/1
1875 TABLET ORAL 2 TIMES DAILY WITH MEALS
Qty: 540 TABLET | Refills: 3 | Status: SHIPPED | OUTPATIENT
Start: 2022-09-13 | End: 2023-11-02

## 2022-09-13 ASSESSMENT — FIBROSIS 4 INDEX: FIB4 SCORE: 0.84

## 2022-09-13 NOTE — PROGRESS NOTES
Virtual Visit: Established Patient   This visit was conducted via Zoom using secure and encrypted videoconferencing technology.   The patient was in their home in the [facility].    The patient's identity was confirmed and verbal consent was obtained for this virtual visit.     Subjective:   CC:   Chief Complaint   Patient presents with    Lab Results     Any Schwarz is a 52 y.o. female presenting for evaluation and management of:    Anxiety and depression  Chronic, ongoing.  Continue citalopram 20 mg daily, denies side effects of the medication.  Has taken it for greater than 10 years.    Mixed hyperlipidemia  Chronic, ongoing.  Unable to tolerate statins due to myalgias.  Continues WelChol 1875 mg twice daily, denies side effects of medication.     ROS   Constitutional: No fevers, chills, malaise/fatigue.  Eyes: No eye pain.  ENT: No sore throat, congestion.   Resp: No cough, shortness of breath.  CV: No chest pain, leg swelling, palpitations.  GI: No nausea/vomiting, abdominal pain, constipation, diarrhea.  : No dysuria, hematuria.  MSK: No weakness.  Skin: No rashes.  Neuro: No dizziness, weakness, headaches.  Psych: No suicidal ideations.    All remaining systems reviewed and found to be negative, except as stated above.      Current medicines (including changes today)  Current Outpatient Medications   Medication Sig Dispense Refill    Multiple Vitamin (MULTIVITAMIN PO) Take  by mouth.      VITAMIN D PO Take  by mouth every day.      citalopram (CELEXA) 20 MG Tab Take 1 Tablet by mouth every day. 90 Tablet 3    colesevelam (WELCHOL) 625 MG Tab Take 3 Tablets by mouth 2 times a day with meals. 540 Tablet 3     No current facility-administered medications for this visit.     Patient Active Problem List    Diagnosis Date Noted    Class 1 obesity due to excess calories with serious comorbidity and body mass index (BMI) of 32.0 to 32.9 in adult 05/10/2022    Statin intolerance 03/30/2021    Thinning  "hair 02/25/2021    Gastroesophageal reflux disease without esophagitis 02/25/2021    Stress incontinence 02/25/2021    Cold sore 02/25/2021    Elevated fasting glucose 08/25/2020    Memory loss 02/20/2020    Chronic pain of both shoulders 04/10/2019    Left arm pain 06/14/2017    Arthralgia 06/10/2016    Vitamin D deficiency 11/01/2015    Mixed hyperlipidemia 10/23/2015    Anxiety and depression 10/15/2015      Objective:   Resp 15   Ht 1.651 m (5' 5\")   Wt 89.8 kg (198 lb)   LMP 05/01/2014   BMI 32.95 kg/m²     Physical Exam:  Constitutional: Alert, no distress, well-groomed.  Skin: No rashes in visible areas.  Eye: Round. Conjunctiva clear, lids normal. No icterus.   ENMT: Lips pink without lesions, good dentition, moist mucous membranes. Phonation normal.  Neck: No masses, no thyromegaly. Moves freely without pain.  Respiratory: Unlabored respiratory effort, no cough or audible wheeze  Psych: Alert and oriented x3, normal affect and mood.     Assessment and Plan:   The following treatment plan was discussed:   1. Mixed hyperlipidemia  Chronic, ongoing.  Continue WelChol 1875 mg twice daily, refill sent to pharmacy.  Due for updated labs in September 2023 prior to annual follow-up.  - colesevelam (WELCHOL) 625 MG Tab; Take 3 Tablets by mouth 2 times a day with meals.  Dispense: 540 Tablet; Refill: 3  - Comp Metabolic Panel; Future  - Lipid Profile; Future    2. Class 1 obesity due to excess calories with serious comorbidity and body mass index (BMI) of 32.0 to 32.9 in adult  Due for updated labs in September 2023 prior to annual follow-up.  - Patient identified as having weight management issue.  Appropriate orders and counseling given.  - HEMOGLOBIN A1C; Future  - CBC WITH DIFFERENTIAL; Future  - Comp Metabolic Panel; Future  - Lipid Profile; Future  - TSH WITH REFLEX TO FT4; Future    3. Elevated fasting glucose  Due for updated labs in September 2023 prior to annual follow-up.  - HEMOGLOBIN A1C; Future  - " Comp Metabolic Panel; Future  - Lipid Profile; Future    4. Anxiety and depression  Chronic, ongoing.  Continue citalopram 20 mg daily, refill sent to pharmacy. Due for updated labs in September 2023 prior to annual follow-up.  - citalopram (CELEXA) 20 MG Tab; Take 1 Tablet by mouth every day.  Dispense: 90 Tablet; Refill: 3  - TSH WITH REFLEX TO FT4; Future    5. Vitamin D deficiency  Chronic, ongoing.  Continue over-the-counter vitamin D 5000 units daily. Due for updated labs in September 2023 prior to annual follow-up.  - VITAMIN D,25 HYDROXY (DEFICIENCY); Future    6. Gastroesophageal reflux disease without esophagitis  Due for updated labs in September 2023 prior to annual follow-up.  - CBC WITH DIFFERENTIAL; Future  - Comp Metabolic Panel; Future  - TSH WITH REFLEX TO FT4; Future    7. Routine health maintenance  Due for updated labs in September 2023 prior to annual follow-up.  - HEMOGLOBIN A1C; Future  - CBC WITH DIFFERENTIAL; Future  - Comp Metabolic Panel; Future  - Lipid Profile; Future  - TSH WITH REFLEX TO FT4; Future  - VITAMIN D,25 HYDROXY (DEFICIENCY); Future     Follow-up: Return in about 1 year (around 9/13/2023) for Preventative Annual, Follow up Labs, As needed.       Please note that this dictation was created using voice recognition software. I have worked with consultants from the vendor as well as technical experts from [facility] [employer] to optimize the interface. I have made every reasonable attempt to correct obvious errors, but I expect that there are errors of grammar and possibly content that I did not discover before finalizing the note.

## 2022-09-13 NOTE — ASSESSMENT & PLAN NOTE
Chronic, ongoing.  Continue citalopram 20 mg daily, denies side effects of the medication.  Has taken it for greater than 10 years.

## 2022-09-13 NOTE — ASSESSMENT & PLAN NOTE
Chronic, ongoing.  Unable to tolerate statins due to myalgias.  Continues WelChol 1875 mg twice daily, denies side effects of medication.

## 2023-01-27 ENCOUNTER — OFFICE VISIT (OUTPATIENT)
Dept: URGENT CARE | Facility: PHYSICIAN GROUP | Age: 53
End: 2023-01-27
Payer: COMMERCIAL

## 2023-01-27 VITALS
BODY MASS INDEX: 32.49 KG/M2 | TEMPERATURE: 97.8 F | SYSTOLIC BLOOD PRESSURE: 112 MMHG | OXYGEN SATURATION: 95 % | WEIGHT: 195 LBS | HEART RATE: 107 BPM | DIASTOLIC BLOOD PRESSURE: 70 MMHG | HEIGHT: 65 IN | RESPIRATION RATE: 18 BRPM

## 2023-01-27 DIAGNOSIS — U07.1 COVID-19: ICD-10-CM

## 2023-01-27 PROCEDURE — 99214 OFFICE O/P EST MOD 30 MIN: CPT | Mod: CS | Performed by: PHYSICIAN ASSISTANT

## 2023-01-27 ASSESSMENT — ENCOUNTER SYMPTOMS
SORE THROAT: 0
SHORTNESS OF BREATH: 0
MYALGIAS: 1
FEVER: 0
PALPITATIONS: 0
COUGH: 1
CHILLS: 0

## 2023-01-27 ASSESSMENT — FIBROSIS 4 INDEX: FIB4 SCORE: 0.84

## 2023-01-27 NOTE — PROGRESS NOTES
Subjective:   Any Schwarz is a 52 y.o. female who presents today with   Chief Complaint   Patient presents with    Other     COVID +, pt is requesting antivirals     Cough  This is a new problem. The current episode started yesterday. The problem has been unchanged. The problem occurs every few minutes. The cough is Non-productive. Associated symptoms include myalgias. Pertinent negatives include no chest pain, chills, fever, sore throat or shortness of breath. She has tried nothing for the symptoms. The treatment provided no relief.     PMH:  has a past medical history of Anesthesia, Anxiety, Arthritis, Cancer ([facility]) (1990), Depression, Endometriosis (1995), High cholesterol, Kidney stone, Other specified disorder of intestines, Other specified symptom associated with female genital organs, Ovarian cyst, Ovarian cyst (1995), PONV (postoperative nausea and vomiting), and Unspecified urinary incontinence.    She has no past medical history of Diabetic neuropathy ([facility]).  MEDS:   Current Outpatient Medications:     Nirmatrelvir&Ritonavir 300/100 20 x 150 MG & 10 x 100MG Tablet Therapy Pack, Take 300 mg nirmatrelvir (two 150 mg tablets) with 100 mg ritonavir (one 100 mg tablet) by mouth, with all three tablets taken together twice daily for 5 days., Disp: 30 Each, Rfl: 0    Multiple Vitamin (MULTIVITAMIN PO), Take  by mouth., Disp: , Rfl:     VITAMIN D PO, Take 5,000 Units by mouth every day., Disp: , Rfl:     citalopram (CELEXA) 20 MG Tab, Take 1 Tablet by mouth every day., Disp: 90 Tablet, Rfl: 3    colesevelam (WELCHOL) 625 MG Tab, Take 3 Tablets by mouth 2 times a day with meals., Disp: 540 Tablet, Rfl: 3    Biotin 1000 MCG Tab, Take 1,000 mcg by mouth every day., Disp: , Rfl:     Apoaequorin (PREVAGEN PO), Take 1 Tablet by mouth every day., Disp: , Rfl:   ALLERGIES:   Allergies   Allergen Reactions    Minocycline Hives     Lip swelling    Statins [Hmg-Coa-R Inhibitors]      Muscle aches with a few  "different types - years ago.      SURGHX:   Past Surgical History:   Procedure Laterality Date    MS CYSTOSCOPY,INSERT URETERAL STENT Left 12/16/2021    Procedure: CYSTOSCOPY, WITH URETERAL STENT INSERTION;  Surgeon: Ivan Carpenter M.D.;  Location: [facility];  Service: Urology    MS CYSTO/URETERO/PYELOSCOPY, DX Left 12/16/2021    Procedure: URETEROSCOPY;  Surgeon: Ivan Carpenter M.D.;  Location: SURGERY [facility];  Service: Urology    LASERTRIPSY Left 12/16/2021    Procedure: LITHOTRIPSY, USING LASER;  Surgeon: Ivan Carpenter M.D.;  Location: [facility];  Service: Urology    ABDOMINAL HYSTERECTOMY TOTAL  6/24/2014    Performed by Hernandez Kilpatrick M.D. at SURGERY SAME DAY [facility] ORS    HYSTEROSCOPY WITH VIDEO OPERATIVE  11/25/2013    Performed by Hernandez Kilpatrick M.D. at SURGERY SURGICAL [facility] ORS     SOCHX:  reports that she has never smoked. She has never used smokeless tobacco. She reports current alcohol use of about 4.2 oz per week. She reports that she does not use drugs.  FH: Reviewed with patient, not pertinent to this visit.     Review of Systems   Constitutional:  Negative for chills and fever.   HENT:  Positive for congestion. Negative for sore throat.    Respiratory:  Positive for cough. Negative for shortness of breath.    Cardiovascular:  Negative for chest pain and palpitations.   Musculoskeletal:  Positive for myalgias.      Objective:   /70   Pulse (!) 107   Temp 36.6 °C (97.8 °F)   Resp 18   Ht 1.651 m (5' 5\")   Wt 88.5 kg (195 lb)   LMP 05/01/2014   SpO2 95%   BMI 32.45 kg/m²   Physical Exam  Vitals and nursing note reviewed.   Constitutional:       General: She is not in acute distress.     Appearance: Normal appearance. She is well-developed. She is not ill-appearing or toxic-appearing.   HENT:      Head: Normocephalic and atraumatic.      Right Ear: Hearing normal.      Left Ear: Hearing normal.   Cardiovascular:      Rate and Rhythm: " Normal rate and regular rhythm.      Heart sounds: Normal heart sounds.   Pulmonary:      Effort: Pulmonary effort is normal.      Breath sounds: Normal breath sounds.   Musculoskeletal:      Comments: Normal movement in all 4 extremities   Skin:     General: Skin is warm and dry.   Neurological:      Mental Status: She is alert.      Coordination: Coordination normal.   Psychiatric:         Mood and Affect: Mood normal.       Assessment/Plan:   Assessment    1. COVID-19  - Nirmatrelvir&Ritonavir 300/100 20 x 150 MG & 10 x 100MG Tablet Therapy Pack; Take 300 mg nirmatrelvir (two 150 mg tablets) with 100 mg ritonavir (one 100 mg tablet) by mouth, with all three tablets taken together twice daily for 5 days.  Dispense: 30 Each; Refill: 0  Symptoms and presentation consistent with  COVID at this time.  Vital signs are stable on exam today.  Discussed CDC guidelines including self isolation at home.   Patient encouraged to get plenty of rest, use OTC tylenol for pain/fever, and drink plenty of fluids.    Discussed with patient that she does not meet criteria for having any pre-existing risk factors for antiviral therapy but she would still like to be treated with antivirals at this time.  She is fully understanding of emergency use authorized medication guidelines and provided with informational pamphlet regarding antiviral from [employer].  I did discuss with her alternative treatments including conservative over-the-counter remedies and that her body would likely be able to fight this off on her own but she would still like to have the antiviral medication today.  Previous GFR was 84, 6 months ago.  No indication for x-ray on exam today.  Patient will pause her cholesterol medication while on antiviral.     Differential diagnosis, natural history, supportive care, and indications for immediate follow-up discussed.   Patient given instructions and understanding of medications and treatment.    If not improving in 3-5 days,  F/U with PCP or return to UC if symptoms worsen.    Patient agreeable to plan.    Please note that this dictation was created using voice recognition software. I have made every reasonable attempt to correct obvious errors, but I expect that there are errors of grammar and possibly content that I did not discover before finalizing the note.    Micah Wyatt PA-C

## 2023-08-03 ENCOUNTER — APPOINTMENT (RX ONLY)
Dept: URBAN - METROPOLITAN AREA CLINIC 22 | Facility: CLINIC | Age: 53
Setting detail: DERMATOLOGY
End: 2023-08-03

## 2023-08-03 DIAGNOSIS — D18.0 HEMANGIOMA: ICD-10-CM

## 2023-08-03 DIAGNOSIS — Z71.89 OTHER SPECIFIED COUNSELING: ICD-10-CM

## 2023-08-03 DIAGNOSIS — L81.4 OTHER MELANIN HYPERPIGMENTATION: ICD-10-CM

## 2023-08-03 DIAGNOSIS — D22 MELANOCYTIC NEVI: ICD-10-CM

## 2023-08-03 DIAGNOSIS — L82.1 OTHER SEBORRHEIC KERATOSIS: ICD-10-CM

## 2023-08-03 DIAGNOSIS — L57.0 ACTINIC KERATOSIS: ICD-10-CM

## 2023-08-03 DIAGNOSIS — Z85.820 PERSONAL HISTORY OF MALIGNANT MELANOMA OF SKIN: ICD-10-CM

## 2023-08-03 PROBLEM — L64.9 ANDROGENIC ALOPECIA, UNSPECIFIED: Status: ACTIVE | Noted: 2023-08-03

## 2023-08-03 PROBLEM — N63.0 UNSPECIFIED LUMP IN UNSPECIFIED BREAST: Status: ACTIVE | Noted: 2023-08-03

## 2023-08-03 PROBLEM — D18.01 HEMANGIOMA OF SKIN AND SUBCUTANEOUS TISSUE: Status: ACTIVE | Noted: 2023-08-03

## 2023-08-03 PROBLEM — D22.72 MELANOCYTIC NEVI OF LEFT LOWER LIMB, INCLUDING HIP: Status: ACTIVE | Noted: 2023-08-03

## 2023-08-03 PROBLEM — D22.5 MELANOCYTIC NEVI OF TRUNK: Status: ACTIVE | Noted: 2023-08-03

## 2023-08-03 PROBLEM — D23.62 OTHER BENIGN NEOPLASM OF SKIN OF LEFT UPPER LIMB, INCLUDING SHOULDER: Status: ACTIVE | Noted: 2023-08-03

## 2023-08-03 PROCEDURE — 17000 DESTRUCT PREMALG LESION: CPT

## 2023-08-03 PROCEDURE — ? PRESCRIPTION

## 2023-08-03 PROCEDURE — 99213 OFFICE O/P EST LOW 20 MIN: CPT | Mod: 25

## 2023-08-03 PROCEDURE — ? LIQUID NITROGEN

## 2023-08-03 PROCEDURE — ? SUNSCREEN RECOMMENDATIONS

## 2023-08-03 PROCEDURE — ? ADDITIONAL NOTES

## 2023-08-03 PROCEDURE — ? COUNSELING

## 2023-08-03 RX ORDER — MINOXIDIL/FINASTERIDE 7 %-0.1 %
1 SOLUTION, NON-ORAL TOPICAL QD
Qty: 60 | Refills: 4 | Status: ERX

## 2023-08-03 ASSESSMENT — LOCATION DETAILED DESCRIPTION DERM
LOCATION DETAILED: LEFT PROXIMAL PRETIBIAL REGION
LOCATION DETAILED: LEFT PROXIMAL DORSAL FOREARM
LOCATION DETAILED: LEFT LATERAL ABDOMEN
LOCATION DETAILED: LEFT KNEE
LOCATION DETAILED: LEFT INFERIOR UPPER BACK
LOCATION DETAILED: RIGHT MID-UPPER BACK
LOCATION DETAILED: LEFT SUPERIOR FOREHEAD

## 2023-08-03 ASSESSMENT — LOCATION ZONE DERM
LOCATION ZONE: LEG
LOCATION ZONE: TRUNK
LOCATION ZONE: ARM
LOCATION ZONE: FACE

## 2023-08-03 ASSESSMENT — LOCATION SIMPLE DESCRIPTION DERM
LOCATION SIMPLE: ABDOMEN
LOCATION SIMPLE: LEFT KNEE
LOCATION SIMPLE: RIGHT UPPER BACK
LOCATION SIMPLE: LEFT PRETIBIAL REGION
LOCATION SIMPLE: LEFT FOREHEAD
LOCATION SIMPLE: LEFT UPPER BACK
LOCATION SIMPLE: LEFT FOREARM

## 2023-08-03 NOTE — PROCEDURE: ADDITIONAL NOTES
Additional Notes: Pt requested refill today
Render Risk Assessment In Note?: no
Detail Level: Simple
Additional Notes: Pt is due for mammogram, she is set to see her primary next month. Advised pt she could call to see if she can get in for mammogram before that appointment. Lesion is near site that was bx’d years prior. Possible scar tissue

## 2023-08-03 NOTE — PROCEDURE: COUNSELING
Quality 137: Melanoma: Continuity Of Care - Recall System: Patient information entered into a recall system that includes: target date for the next exam specified AND a process to follow up with patients regarding missed or unscheduled appointments
Quality 224: Stage 0-Iic Melanoma: Overutilization Of Imaging Studies For Only Stage 0-Iic Melanoma: None of the following diagnostic imaging studies ordered: chest X-ray, CT, Ultrasound, MRI, PET, or nuclear medicine scans (ML)
Detail Level: Detailed
When Should The Patient Follow-Up For Their Next Full-Body Skin Exam?: 1 Year
Detail Level: Generalized
Detail Level: Zone
Patient Specific Counseling (Will Not Stick From Patient To Patient): No overtly discrete lump or mass palpated. No pain, contraction, deformity, nipple changes noted.  Of note, patient did have a mole removed in the past in this area, there is some slightly atrophic scar tissue. \\nPabdirahman has appt with her PCP next month.  She is due for her screening mammogram this month.  \\nShe will plan to follow up with PCP as sched however did encourage her to reach out if she is noticing further concerning changes, pain, etc prior to her sched appt

## 2023-08-03 NOTE — PROCEDURE: LIQUID NITROGEN
Render Post-Care Instructions In Note?: no
Post-Care Instructions: I reviewed with the patient in detail post-care instructions. Patient is to wear sunprotection, and avoid picking at any of the treated lesions. Pt may apply Vaseline to crusted or scabbing areas.
Consent: The patient's consent was obtained including but not limited to risks of crusting, scabbing, blistering, scarring, darker or lighter pigmentary change, recurrence, incomplete removal and infection.
Show Aperture Variable?: Yes
Detail Level: Detailed
Duration Of Freeze Thaw-Cycle (Seconds): 3
Number Of Freeze-Thaw Cycles: 2 freeze-thaw cycles

## 2023-08-18 ENCOUNTER — HOSPITAL ENCOUNTER (OUTPATIENT)
Dept: RADIOLOGY | Facility: MEDICAL CENTER | Age: 53
End: 2023-08-18
Attending: STUDENT IN AN ORGANIZED HEALTH CARE EDUCATION/TRAINING PROGRAM
Payer: COMMERCIAL

## 2023-08-18 DIAGNOSIS — D17.71 BENIGN LIPOMATOUS NEOPLASM OF KIDNEY: ICD-10-CM

## 2023-08-18 PROCEDURE — 76775 US EXAM ABDO BACK WALL LIM: CPT

## 2023-08-26 ENCOUNTER — OFFICE VISIT (OUTPATIENT)
Dept: URGENT CARE | Facility: PHYSICIAN GROUP | Age: 53
End: 2023-08-26
Payer: COMMERCIAL

## 2023-08-26 VITALS
TEMPERATURE: 96.3 F | SYSTOLIC BLOOD PRESSURE: 112 MMHG | RESPIRATION RATE: 16 BRPM | WEIGHT: 200 LBS | BODY MASS INDEX: 33.32 KG/M2 | HEIGHT: 65 IN | DIASTOLIC BLOOD PRESSURE: 66 MMHG | HEART RATE: 84 BPM | OXYGEN SATURATION: 97 %

## 2023-08-26 DIAGNOSIS — R19.05 PERIUMBILICAL MASS: ICD-10-CM

## 2023-08-26 PROCEDURE — 3078F DIAST BP <80 MM HG: CPT

## 2023-08-26 PROCEDURE — 99214 OFFICE O/P EST MOD 30 MIN: CPT

## 2023-08-26 PROCEDURE — 3074F SYST BP LT 130 MM HG: CPT

## 2023-08-26 RX ORDER — OXYBUTYNIN CHLORIDE 5 MG/1
TABLET ORAL
COMMUNITY
End: 2023-09-19

## 2023-08-26 RX ORDER — IBUPROFEN 800 MG/1
TABLET ORAL
COMMUNITY
End: 2023-09-19

## 2023-08-26 RX ORDER — TAMSULOSIN HYDROCHLORIDE 0.4 MG/1
CAPSULE ORAL
COMMUNITY
End: 2023-09-19

## 2023-08-26 RX ORDER — VALACYCLOVIR HYDROCHLORIDE 1 G/1
TABLET, FILM COATED ORAL
COMMUNITY
End: 2023-11-02

## 2023-08-26 RX ORDER — NITROFURANTOIN 25; 75 MG/1; MG/1
100 CAPSULE ORAL 2 TIMES DAILY
Qty: 10 CAPSULE | Refills: 0 | Status: CANCELLED | OUTPATIENT
Start: 2023-08-26 | End: 2023-08-31

## 2023-08-26 RX ORDER — HYDROCODONE BITARTRATE AND ACETAMINOPHEN 7.5; 325 MG/1; MG/1
TABLET ORAL
COMMUNITY
End: 2023-09-19

## 2023-08-26 ASSESSMENT — CROHNS DISEASE ACTIVITY INDEX (CDAI): CDAI SCORE: 0

## 2023-08-26 ASSESSMENT — ENCOUNTER SYMPTOMS
CONSTIPATION: 0
NAUSEA: 1
HEMATOCHEZIA: 0
VOMITING: 0
FEVER: 0
ANOREXIA: 0
ABDOMINAL PAIN: 1
DIARRHEA: 0

## 2023-08-26 ASSESSMENT — FIBROSIS 4 INDEX: FIB4 SCORE: 0.86

## 2023-08-26 NOTE — PROGRESS NOTES
Subjective     Any Schwarz is a 53 y.o. female who presents with Other (Started on Wednesday Pain across the abdominal and now feels a lump above her belly button, starting to be nauseas when waking up )            Abdominal Pain  This is a new problem. The current episode started in the past 7 days. The problem has been gradually improving. The pain is located in the suprapubic region. The pain is mild. The quality of the pain is aching. The abdominal pain does not radiate. Associated symptoms include nausea. Pertinent negatives include no anorexia, constipation, diarrhea, fever, hematochezia, melena or vomiting. The pain is relieved by Palpation. She has tried nothing for the symptoms. Her past medical history is significant for abdominal surgery and GERD. There is no history of Crohn's disease or gallstones.     Patient presents with symptoms about 3 to 4 days ago.  She reports abdominal pain across her umbilical area which has eventually resolved.  She noted a mass above her umbilical area which was tender to deep palpation.  She noted nausea but denies any vomiting.    Patient's current problem list, medications, and past medical/surgical history were reviewed in Epic.    PMH:  has a past medical history of Anesthesia, Anxiety, Arthritis, Cancer ([facility]) (1990), Depression, Endometriosis (1995), High cholesterol, Kidney stone, Other specified disorder of intestines, Other specified symptom associated with female genital organs, Ovarian cyst, Ovarian cyst (1995), PONV (postoperative nausea and vomiting), and Unspecified urinary incontinence.    She has no past medical history of Diabetic neuropathy ([facility]).  MEDS:   Current Outpatient Medications:     HYDROcodone-acetaminophen (NORCO) 7.5-325 MG tab, , Disp: , Rfl:     ibuprofen (MOTRIN) 800 MG Tab, Take 1 tablet 3 times a day by oral route for 7 days., Disp: , Rfl:     oxybutynin (DITROPAN) 5 MG Tab, , Disp: , Rfl:     tamsulosin (FLOMAX) 0.4 MG capsule, ,  Disp: , Rfl:     valacyclovir (VALTREX) 1 GM Tab, , Disp: , Rfl:     Multiple Vitamin (MULTIVITAMIN PO), Take  by mouth., Disp: , Rfl:     VITAMIN D PO, Take 5,000 Units by mouth every day., Disp: , Rfl:     citalopram (CELEXA) 20 MG Tab, Take 1 Tablet by mouth every day., Disp: 90 Tablet, Rfl: 3    colesevelam (WELCHOL) 625 MG Tab, Take 3 Tablets by mouth 2 times a day with meals., Disp: 540 Tablet, Rfl: 3    Biotin 1000 MCG Tab, Take 1,000 mcg by mouth every day., Disp: , Rfl:     Apoaequorin (PREVAGEN PO), Take 1 Tablet by mouth every day., Disp: , Rfl:   ALLERGIES:   Allergies   Allergen Reactions    Minocycline Hives     Lip swelling    Statins [Hmg-Coa-R Inhibitors]      Muscle aches with a few different types - years ago.      SURGHX:   Past Surgical History:   Procedure Laterality Date    NE CYSTOSCOPY,INSERT URETERAL STENT Left 12/16/2021    Procedure: CYSTOSCOPY, WITH URETERAL STENT INSERTION;  Surgeon: Ivan Carpenter M.D.;  Location: [facility];  Service: Urology    NE CYSTO/URETERO/PYELOSCOPY, DX Left 12/16/2021    Procedure: URETEROSCOPY;  Surgeon: Ivan Carpenter M.D.;  Location: [facility];  Service: Urology    LASERTRIPSY Left 12/16/2021    Procedure: LITHOTRIPSY, USING LASER;  Surgeon: Ivan Carpenter M.D.;  Location: [facility];  Service: Urology    ABDOMINAL HYSTERECTOMY TOTAL  6/24/2014    Performed by Hernandez Kilpatrick M.D. at SURGERY SAME DAY [facility] ORS    HYSTEROSCOPY WITH VIDEO OPERATIVE  11/25/2013    Performed by Hernandez Kilpatrick M.D. at SURGERY [facility] ORS     SOCHX:  reports that she has never smoked. She has never used smokeless tobacco. She reports current alcohol use of about 4.2 oz of alcohol per week. She reports that she does not use drugs.  FH: Reviewed with patient, not pertinent to this visit.       Review of Systems   Constitutional:  Negative for fever.   Gastrointestinal:  Positive for abdominal pain and nausea. Negative  "for anorexia, constipation, diarrhea, hematochezia, melena and vomiting.   All other systems reviewed and are negative.             Objective     /66 (BP Location: Left arm, Patient Position: Sitting, BP Cuff Size: Adult)   Pulse 84   Temp (!) 35.7 °C (96.3 °F) (Temporal)   Resp 16   Ht 1.651 m (5' 5\")   Wt 90.7 kg (200 lb)   LMP 05/01/2014   SpO2 97%   BMI 33.28 kg/m²      Physical Exam  Constitutional:       Appearance: Normal appearance.   HENT:      Head: Normocephalic.      Nose: Nose normal.   Eyes:      Extraocular Movements: Extraocular movements intact.   Cardiovascular:      Rate and Rhythm: Normal rate and regular rhythm.      Pulses: Normal pulses.      Heart sounds: Normal heart sounds.   Pulmonary:      Effort: Pulmonary effort is normal.      Breath sounds: Normal breath sounds.   Abdominal:      General: Abdomen is flat. Bowel sounds are normal.      Palpations: Abdomen is soft.      Tenderness: There is no guarding or rebound.          Comments: 1 x 1 cm firm, movable, mildly tender mass in the periumbilical area (top of umbilicus)   Musculoskeletal:         General: Normal range of motion.      Cervical back: Normal range of motion.   Skin:     General: Skin is warm.   Neurological:      General: No focal deficit present.      Mental Status: She is alert.   Psychiatric:         Mood and Affect: Mood normal.         Behavior: Behavior normal.               Assessment & Plan         1. Periumbilical mass    - US-ABDOMEN LTD (SOFT TISSUE); Future       Patient's physical exam revealed a small tender movable mass on top of her umbilicus.  Abdominal muscle/soft tissue ultrasound ordered. Differential diagnoses, supportive care, and indications for immediate follow-up discussed with patient.  Pathogenesis of diagnosis discussed including typical length and natural progression.  Instructed to return to clinic or nearest emergency department for any change in condition, further concerns, or " worsening of symptoms.  Discussed treatment plan with patient, she is agreeable and verbalized understanding.  Educated patient on signs and symptoms watch out for, when to return to the clinic or go to the ER.      Addendum 9/3/2023: Spoke with patient on the phone regarding abdominal imaging results.  Her ultrasound done on 8/28/2023 showed small fat containing umbilical hernia with apparent inflammation, nonreducible.  No herniated bowel loop demonstrated.  Patient reports that overall she is feeling better except for the mass.  Advised follow-up with her PCP.      Electronically Signed by VIKTORIA Whitley

## 2023-08-28 ENCOUNTER — HOSPITAL ENCOUNTER (OUTPATIENT)
Dept: RADIOLOGY | Facility: MEDICAL CENTER | Age: 53
End: 2023-08-28
Payer: COMMERCIAL

## 2023-08-28 DIAGNOSIS — R19.05 PERIUMBILICAL MASS: ICD-10-CM

## 2023-08-28 PROCEDURE — 76705 ECHO EXAM OF ABDOMEN: CPT

## 2023-09-02 ENCOUNTER — HOSPITAL ENCOUNTER (OUTPATIENT)
Dept: LAB | Facility: MEDICAL CENTER | Age: 53
End: 2023-09-02
Attending: NURSE PRACTITIONER
Payer: COMMERCIAL

## 2023-09-02 DIAGNOSIS — E55.9 VITAMIN D DEFICIENCY: ICD-10-CM

## 2023-09-02 DIAGNOSIS — E78.2 MIXED HYPERLIPIDEMIA: ICD-10-CM

## 2023-09-02 DIAGNOSIS — F32.A ANXIETY AND DEPRESSION: ICD-10-CM

## 2023-09-02 DIAGNOSIS — K21.9 GASTROESOPHAGEAL REFLUX DISEASE WITHOUT ESOPHAGITIS: ICD-10-CM

## 2023-09-02 DIAGNOSIS — F41.9 ANXIETY AND DEPRESSION: ICD-10-CM

## 2023-09-02 DIAGNOSIS — E66.09 CLASS 1 OBESITY DUE TO EXCESS CALORIES WITH SERIOUS COMORBIDITY AND BODY MASS INDEX (BMI) OF 32.0 TO 32.9 IN ADULT: ICD-10-CM

## 2023-09-02 DIAGNOSIS — Z00.00 ROUTINE HEALTH MAINTENANCE: ICD-10-CM

## 2023-09-02 DIAGNOSIS — R73.01 ELEVATED FASTING GLUCOSE: ICD-10-CM

## 2023-09-02 LAB
25(OH)D3 SERPL-MCNC: 37 NG/ML (ref 30–100)
ALBUMIN SERPL BCP-MCNC: 4.2 G/DL (ref 3.2–4.9)
ALBUMIN/GLOB SERPL: 1.7 G/DL
ALP SERPL-CCNC: 105 U/L (ref 30–99)
ALT SERPL-CCNC: 30 U/L (ref 2–50)
ANION GAP SERPL CALC-SCNC: 9 MMOL/L (ref 7–16)
AST SERPL-CCNC: 26 U/L (ref 12–45)
BASOPHILS # BLD AUTO: 1.2 % (ref 0–1.8)
BASOPHILS # BLD: 0.08 K/UL (ref 0–0.12)
BILIRUB SERPL-MCNC: 0.2 MG/DL (ref 0.1–1.5)
BUN SERPL-MCNC: 14 MG/DL (ref 8–22)
CALCIUM ALBUM COR SERPL-MCNC: 9.2 MG/DL (ref 8.5–10.5)
CALCIUM SERPL-MCNC: 9.4 MG/DL (ref 8.5–10.5)
CHLORIDE SERPL-SCNC: 108 MMOL/L (ref 96–112)
CHOLEST SERPL-MCNC: 168 MG/DL (ref 100–199)
CO2 SERPL-SCNC: 23 MMOL/L (ref 20–33)
CREAT SERPL-MCNC: 0.83 MG/DL (ref 0.5–1.4)
EOSINOPHIL # BLD AUTO: 0.26 K/UL (ref 0–0.51)
EOSINOPHIL NFR BLD: 3.8 % (ref 0–6.9)
ERYTHROCYTE [DISTWIDTH] IN BLOOD BY AUTOMATED COUNT: 49.6 FL (ref 35.9–50)
EST. AVERAGE GLUCOSE BLD GHB EST-MCNC: 103 MG/DL
FASTING STATUS PATIENT QL REPORTED: NORMAL
GFR SERPLBLD CREATININE-BSD FMLA CKD-EPI: 83 ML/MIN/1.73 M 2
GLOBULIN SER CALC-MCNC: 2.5 G/DL (ref 1.9–3.5)
GLUCOSE SERPL-MCNC: 96 MG/DL (ref 65–99)
HBA1C MFR BLD: 5.2 % (ref 4–5.6)
HCT VFR BLD AUTO: 36.8 % (ref 37–47)
HDLC SERPL-MCNC: 52 MG/DL
HGB BLD-MCNC: 11.6 G/DL (ref 12–16)
IMM GRANULOCYTES # BLD AUTO: 0.01 K/UL (ref 0–0.11)
IMM GRANULOCYTES NFR BLD AUTO: 0.1 % (ref 0–0.9)
LDLC SERPL CALC-MCNC: 100 MG/DL
LYMPHOCYTES # BLD AUTO: 2.44 K/UL (ref 1–4.8)
LYMPHOCYTES NFR BLD: 35.9 % (ref 22–41)
MCH RBC QN AUTO: 29.4 PG (ref 27–33)
MCHC RBC AUTO-ENTMCNC: 31.5 G/DL (ref 32.2–35.5)
MCV RBC AUTO: 93.2 FL (ref 81.4–97.8)
MONOCYTES # BLD AUTO: 0.55 K/UL (ref 0–0.85)
MONOCYTES NFR BLD AUTO: 8.1 % (ref 0–13.4)
NEUTROPHILS # BLD AUTO: 3.46 K/UL (ref 1.82–7.42)
NEUTROPHILS NFR BLD: 50.9 % (ref 44–72)
NRBC # BLD AUTO: 0 K/UL
NRBC BLD-RTO: 0 /100 WBC (ref 0–0.2)
PLATELET # BLD AUTO: 307 K/UL (ref 164–446)
PMV BLD AUTO: 11.3 FL (ref 9–12.9)
POTASSIUM SERPL-SCNC: 4.2 MMOL/L (ref 3.6–5.5)
PROT SERPL-MCNC: 6.7 G/DL (ref 6–8.2)
RBC # BLD AUTO: 3.95 M/UL (ref 4.2–5.4)
SODIUM SERPL-SCNC: 140 MMOL/L (ref 135–145)
TRIGL SERPL-MCNC: 81 MG/DL (ref 0–149)
TSH SERPL DL<=0.005 MIU/L-ACNC: 1.56 UIU/ML (ref 0.38–5.33)
WBC # BLD AUTO: 6.8 K/UL (ref 4.8–10.8)

## 2023-09-02 PROCEDURE — 80053 COMPREHEN METABOLIC PANEL: CPT

## 2023-09-02 PROCEDURE — 85025 COMPLETE CBC W/AUTO DIFF WBC: CPT

## 2023-09-02 PROCEDURE — 82306 VITAMIN D 25 HYDROXY: CPT

## 2023-09-02 PROCEDURE — 84443 ASSAY THYROID STIM HORMONE: CPT

## 2023-09-02 PROCEDURE — 80061 LIPID PANEL: CPT

## 2023-09-02 PROCEDURE — 36415 COLL VENOUS BLD VENIPUNCTURE: CPT

## 2023-09-02 PROCEDURE — 83036 HEMOGLOBIN GLYCOSYLATED A1C: CPT

## 2023-09-03 ENCOUNTER — TELEPHONE (OUTPATIENT)
Dept: URGENT CARE | Facility: CLINIC | Age: 53
End: 2023-09-03
Payer: COMMERCIAL

## 2023-09-19 ENCOUNTER — OFFICE VISIT (OUTPATIENT)
Dept: MEDICAL GROUP | Facility: PHYSICIAN GROUP | Age: 53
End: 2023-09-19
Payer: COMMERCIAL

## 2023-09-19 VITALS
WEIGHT: 201 LBS | BODY MASS INDEX: 32.3 KG/M2 | OXYGEN SATURATION: 94 % | RESPIRATION RATE: 16 BRPM | SYSTOLIC BLOOD PRESSURE: 110 MMHG | DIASTOLIC BLOOD PRESSURE: 64 MMHG | HEART RATE: 75 BPM | TEMPERATURE: 96.4 F | HEIGHT: 66 IN

## 2023-09-19 DIAGNOSIS — Z11.59 NEED FOR HEPATITIS C SCREENING TEST: ICD-10-CM

## 2023-09-19 DIAGNOSIS — Z23 NEED FOR VACCINATION: ICD-10-CM

## 2023-09-19 DIAGNOSIS — E78.2 MIXED HYPERLIPIDEMIA: ICD-10-CM

## 2023-09-19 DIAGNOSIS — K42.9 UMBILICAL HERNIA WITHOUT OBSTRUCTION AND WITHOUT GANGRENE: ICD-10-CM

## 2023-09-19 DIAGNOSIS — E66.09 CLASS 1 OBESITY DUE TO EXCESS CALORIES WITH SERIOUS COMORBIDITY AND BODY MASS INDEX (BMI) OF 32.0 TO 32.9 IN ADULT: ICD-10-CM

## 2023-09-19 DIAGNOSIS — N63.13 MASS OF LOWER OUTER QUADRANT OF RIGHT BREAST: ICD-10-CM

## 2023-09-19 DIAGNOSIS — Z11.4 SCREENING FOR HIV WITHOUT PRESENCE OF RISK FACTORS: ICD-10-CM

## 2023-09-19 DIAGNOSIS — Z00.01 ENCOUNTER FOR WELL ADULT EXAM WITH ABNORMAL FINDINGS: ICD-10-CM

## 2023-09-19 DIAGNOSIS — Z00.00 ROUTINE HEALTH MAINTENANCE: ICD-10-CM

## 2023-09-19 DIAGNOSIS — L65.9 THINNING HAIR: ICD-10-CM

## 2023-09-19 DIAGNOSIS — D64.9 ANEMIA, UNSPECIFIED TYPE: ICD-10-CM

## 2023-09-19 PROCEDURE — 3074F SYST BP LT 130 MM HG: CPT | Performed by: NURSE PRACTITIONER

## 2023-09-19 PROCEDURE — 90686 IIV4 VACC NO PRSV 0.5 ML IM: CPT | Performed by: NURSE PRACTITIONER

## 2023-09-19 PROCEDURE — 99396 PREV VISIT EST AGE 40-64: CPT | Mod: 25 | Performed by: NURSE PRACTITIONER

## 2023-09-19 PROCEDURE — 3078F DIAST BP <80 MM HG: CPT | Performed by: NURSE PRACTITIONER

## 2023-09-19 PROCEDURE — 90471 IMMUNIZATION ADMIN: CPT | Performed by: NURSE PRACTITIONER

## 2023-09-19 ASSESSMENT — PATIENT HEALTH QUESTIONNAIRE - PHQ9: CLINICAL INTERPRETATION OF PHQ2 SCORE: 0

## 2023-09-19 ASSESSMENT — FIBROSIS 4 INDEX: FIB4 SCORE: 0.85

## 2023-09-19 NOTE — ASSESSMENT & PLAN NOTE
New to examiner.  Patient reports on 8/24/2023 she felt a lump near her bellybutton.  She was seen in the urgent care on 826 and completed an ultrasound on 828 that confirms umbilical hernia.  She still has the lump in the area, but the pain has lessened but not resolved.  Requesting referral to general surgery.

## 2023-09-19 NOTE — ASSESSMENT & PLAN NOTE
New to examiner.  Patient reports that she had an incidental finding of a lump on her lower outer quadrant of her right breast about 7 o'clock position approximately 3 inches from the nipple.  She does not have any pain in the area.  She discussed the lump with her dermatologist who recommended a mammogram order from PCP.

## 2023-09-19 NOTE — PROGRESS NOTES
"Subjective:   CC:   Chief Complaint   Patient presents with    Annual Exam    Lab Follow-up     HPI:   Any Schwarz is a 53 y.o. female who presents for annual exam:     Umbilical hernia  New to examiner.  Patient reports on 2023 she felt a lump near her bellybutton.  She was seen in the urgent care on 826 and completed an ultrasound on 828 that confirms umbilical hernia.  She still has the lump in the area, but the pain has lessened but not resolved.  Requesting referral to general surgery.    Mass of lower outer quadrant of right breast  New to examiner.  Patient reports that she had an incidental finding of a lump on her lower outer quadrant of her right breast about 7 o'clock position approximately 3 inches from the nipple.  She does not have any pain in the area.  She discussed the lump with her dermatologist who recommended a mammogram order from PCP.     Anticipatory Guidance:  Cholesterol screenin2023   LDL controlled: 100  Diabetes screenin2023   A1c controlled: 5.2%  Diet: Recommend more lean meats, fruits, vegetables, whole grains. \"Could be better\"  Exercise: Encourage regular exercise. \"Could be better\"  Substance abuse: Beer once daily  Safe in relationship: NA  Seatbelts, bike/motorcycle helmet: Yes  Sun protection: Yes  Dentist: Up-to-date  Eye doctor: Up-to-date    Cancer Screening:  Colorectal cancer screenin2020 2110-year recall  Cervical cancer screenin2021  Breast cancer screening: 2022    Infectious Disease Screening/Immunizations:  STI screening: Declines  Chlamydia/Gonorrhea screening: Declines  Hep C screening: Ordered today  HIV screen: Ordered today  Practices safe sex: Yes    Immunizations:   Influenza: Given today   Tetanus: 2019   Pneumonia: N/A, due at age 63   Received HPV series: No    Preventative Care Screening:   Osteoporosis Screening: N/A due at age 63  Tobacco Screening: Never smoker  AAA Screening: N/A    Patient's last " menstrual period was 2014.  She has utilized hormone replacement therapy.  Reports hot flashes, night sweats, and vaginal dryness  No significant bloating/fluid retention, pelvic pain, or dyspareunia. No abnormal vaginal discharge.   No breast tenderness, mass, nipple discharge or changes in size or contour.    OB History    Para Term  AB Living   0 0 0 0 0 0   SAB IAB Ectopic Molar Multiple Live Births   0 0 0 0 0 0     She  reports that she is not currently sexually active and has had partner(s) who are male. She reports using the following method of birth control/protection: Post-Menopausal.  She  has a past medical history of Anesthesia, Anxiety, Arthritis, Cancer ([facility]) (), Depression, Endometriosis (), High cholesterol, Kidney stone, Other specified disorder of intestines, Other specified symptom associated with female genital organs, Ovarian cyst, Ovarian cyst (), PONV (postoperative nausea and vomiting), and Unspecified urinary incontinence.    She has no past medical history of Diabetic neuropathy ([facility]).  She  has a past surgical history that includes hysteroscopy with video operative (2013); abdominal hysterectomy total (2014); pr cystoscopy,insert ureteral stent (Left, 2021); pr cysto/uretero/pyeloscopy, dx (Left, 2021); and lasertripsy (Left, 2021).    Family History   Problem Relation Age of Onset    Hypertension Mother     Cancer Father         prostate    Prostate cancer Father     No Known Problems Sister     No Known Problems Brother     Alzheimer's Disease Maternal Aunt     Diabetes Maternal Aunt     Diabetes Maternal Grandmother     Dementia Maternal Grandmother     Diabetes Paternal Grandfather     Stroke Paternal Grandfather     Alzheimer's Disease Paternal Grandmother     Dementia Paternal Grandmother     Diabetes Maternal Uncle     Diabetes Paternal Aunt     Dementia Paternal Aunt     Diabetes Paternal Uncle     Heart Attack Maternal  Grandfather     Cancer Maternal Grandfather         not sure what type    Dementia Maternal Aunt     Diabetes Maternal Aunt     Diabetes Paternal Aunt     Diabetes Maternal Aunt     Diabetes Paternal Uncle      Social History     Tobacco Use    Smoking status: Never    Smokeless tobacco: Never   Vaping Use    Vaping Use: Never used   Substance Use Topics    Alcohol use: Yes     Alcohol/week: 2.4 oz     Types: 4 Cans of beer per week    Drug use: No     Comment: No, but uses CBD oil on shoulder.      Patient Active Problem List    Diagnosis Date Noted    Umbilical hernia 09/19/2023    Mass of lower outer quadrant of right breast 09/19/2023    Class 1 obesity due to excess calories with serious comorbidity and body mass index (BMI) of 32.0 to 32.9 in adult 05/10/2022    Statin intolerance 03/30/2021    Thinning hair 02/25/2021    Gastroesophageal reflux disease without esophagitis 02/25/2021    Stress incontinence 02/25/2021    Cold sore 02/25/2021    Elevated fasting glucose 08/25/2020    Memory loss 02/20/2020    Chronic pain of both shoulders 04/10/2019    Left arm pain 06/14/2017    Arthralgia 06/10/2016    Vitamin D deficiency 11/01/2015    Mixed hyperlipidemia 10/23/2015    Anxiety and depression 10/15/2015     Current Outpatient Medications   Medication Sig Dispense Refill    valacyclovir (VALTREX) 1 GM Tab       Multiple Vitamin (MULTIVITAMIN PO) Take  by mouth.      VITAMIN D PO Take 5,000 Units by mouth every day.      citalopram (CELEXA) 20 MG Tab Take 1 Tablet by mouth every day. 90 Tablet 3    colesevelam (WELCHOL) 625 MG Tab Take 3 Tablets by mouth 2 times a day with meals. 540 Tablet 3    Biotin 1000 MCG Tab Take 1,000 mcg by mouth every day.      Apoaequorin (PREVAGEN PO) Take 1 Tablet by mouth every day.       No current facility-administered medications for this visit.     Allergies   Allergen Reactions    Minocycline Hives     Lip swelling    Statins [Hmg-Coa-R Inhibitors]      Muscle aches with a  "few different types - years ago.      Review of Systems   Constitutional: Negative for fever, chills and malaise/fatigue.   HENT: Negative for congestion.    Eyes: Negative for pain.   Respiratory: Negative for cough and shortness of breath.    Cardiovascular: Negative for chest pain and leg swelling.   Gastrointestinal: Negative for nausea, vomiting, abdominal pain and diarrhea.   Genitourinary: Negative for dysuria and hematuria.   Skin: Negative for rash.   Neurological: Negative for dizziness, focal weakness and headaches.   Endo/Heme/Allergies: Does not bruise/bleed easily.   Psychiatric/Behavioral: Negative for depression.  The patient is not nervous/anxious.      Objective:   /64 (BP Location: Left arm, Patient Position: Sitting, BP Cuff Size: Large adult)   Pulse 75   Temp (!) 35.8 °C (96.4 °F) (Temporal)   Resp 16   Ht 1.676 m (5' 6\")   Wt 91.2 kg (201 lb)   LMP 05/01/2014   SpO2 94%   BMI 32.44 kg/m²     Wt Readings from Last 4 Encounters:   09/19/23 91.2 kg (201 lb)   08/26/23 90.7 kg (200 lb)   01/27/23 88.5 kg (195 lb)   09/13/22 89.8 kg (198 lb)     A chaperone was offered to the patient during today's exam. Patient declined chaperone.    Physical Exam:  Constitutional: Well-developed and well-nourished. Not diaphoretic. No distress.   Skin: Skin is warm and dry. No rash noted.  Head: Atraumatic without lesions.  Eyes: Conjunctivae and extraocular motions are normal. Pupils are equal, round, and reactive to light. No scleral icterus.   Ears:  External ears unremarkable. Tympanic membranes clear and intact.  Nose: Nares patent. Septum midline. Turbinates without erythema nor edema. No discharge.   Mouth/Throat: Tongue normal. Oropharynx is clear and moist. Posterior pharynx without erythema or exudates.  Neck: Supple, trachea midline. Normal range of motion. No thyromegaly present. No lymphadenopathy--cervical or supraclavicular.  Cardiovascular: Regular rate and rhythm, S1 and S2 without " murmur, rubs, or gallops.    Respiratory: Effort normal. Clear to auscultation throughout. No adventitious sounds.   Breast:  L Breast: No dimpling, no breast tenderness, nodules or masses.  No axillary adenopathy.  No nipple discharge.  R: 2 cm mass palpated at 7 OC, 3 inches from nipple.  No nipple discharge.  No axillary adenopathy.   Abdomen: Soft, non tender, and without distention. Active bowel sounds in all four quadrants. No rebound, guarding.  Extremities: No cyanosis, clubbing, erythema, nor edema. Radial pulses intact and symmetric.   Musculoskeletal: All major joints AROM full in all directions without pain.  Neurological: Alert and oriented x 3. Grossly non-focal. Strength and sensation grossly intact.   Psychiatric:  Behavior, mood, and affect are appropriate.    Assessment and Plan:   1. Encounter for well adult exam without abnormal findings    2. Umbilical hernia without obstruction and without gangrene  New to examiner.  Patient has been seen in the urgent care and had an ultrasound completed that confirmed hernia.  Requesting referral to general surgery, surgery referral placed.  - Referral to General Surgery    3. Mass of lower outer quadrant of right breast  New to examiner.  Patient has an incidental finding of a lump on her lower outer quadrant of her right breast, plan for her to complete diagnostic mammogram and right breast ultrasound.  - US-BREAST LIMITED-RIGHT; Future  - MA-DIAGNOSTIC MAMMO BILAT W/TOMOSYNTHESIS W/CAD; Future    4. Class 1 obesity due to excess calories with serious comorbidity and body mass index (BMI) of 32.0 to 32.9 in adult  Encourage diet high in fruits, vegetables, and fiber. And a diet low in salt, refined carbohydrates, cholesterol, saturated fat, and trans fatty acids.    Encourage a minimum of 30 minutes of moderate intensity aerobic exercise (eg, brisk walking) is recommended on five days each week. Or 30 minutes of vigorous-intensity aerobic exercise (eg,  jogging) on three days each week.   Patient's body mass index is 32.44 kg/m². Exercise and nutrition counseling were performed at this visit.  Plan for patient to complete labs as below and follow-up in 3 months.  - CBC WITH DIFFERENTIAL; Future  - ANTITHYROGLOBULIN AB; Future  - T3 FREE; Future  - FREE THYROXINE; Future  - THYROID PEROXIDASE  (TPO) AB; Future  - TRIIDOTHYRONINE; Future  - TSH; Future    5. Mixed hyperlipidemia  Chronic, ongoing.  Continues WelChol 1875 mg twice daily with meals. Due for updated annual labs in September 2024 prior to annual follow-up.    6. Anemia, unspecified type  Due for updated labs as below, follow-up in 3 months to review.  - CBC WITH DIFFERENTIAL; Future  - IRON/TOTAL IRON BIND; Future  - FERRITIN; Future    7. Thinning hair  Due for updated labs as below, follow-up in 3 months to review.  - CBC WITH DIFFERENTIAL; Future  - IRON/TOTAL IRON BIND; Future  - FERRITIN; Future  - ANTITHYROGLOBULIN AB; Future  - T3 FREE; Future  - FREE THYROXINE; Future  - THYROID PEROXIDASE  (TPO) AB; Future  - TRIIDOTHYRONINE; Future  - TSH; Future    8. Routine health maintenance  Due for updated annual labs in September 2024 prior to annual follow-up.  - CBC WITH DIFFERENTIAL; Future  - IRON/TOTAL IRON BIND; Future  - FERRITIN; Future  - ANTITHYROGLOBULIN AB; Future  - T3 FREE; Future  - FREE THYROXINE; Future  - THYROID PEROXIDASE  (TPO) AB; Future  - TRIIDOTHYRONINE; Future  - TSH; Future    9. Need for hepatitis C screening test  Due for one-time screening.  - HEP C VIRUS ANTIBODY; Future    10. Screening for HIV without presence of risk factors  Due for one-time screening.  - HIV AG/AB COMBO ASSAY SCREENING; Future    11. Need for vaccination  Given today.  - INFLUENZA VACCINE QUAD INJ (PF)     Health maintenance: Up-to-date  Labs per orders  Immunizations: Per orders  Patient counseled about skin care, diet, supplements, and exercise.  Discussed  breast self exam, mammography screening,  menopause, osteoporosis, adequate intake of calcium and vitamin D, diet and exercise, colorectal cancer screening     Follow-up: Return in about 3 months (around 12/19/2023) for Follow up Labs.     I have placed the below orders and discussed them with an approved delegating provider. The MA is performing the below orders under the direction of Dr. Disla.      Please note that this dictation was created using voice recognition software. I have worked with consultants from the vendor as well as technical experts from [facility] to optimize the interface. I have made every reasonable attempt to correct obvious errors, but I expect that there are errors of grammar and possibly content that I did not discover before finalizing the note.

## 2023-09-20 ENCOUNTER — TELEPHONE (OUTPATIENT)
Dept: URGENT CARE | Facility: PHYSICIAN GROUP | Age: 53
End: 2023-09-20
Payer: COMMERCIAL

## 2023-09-20 NOTE — LETTER
9/20/2023            Any Schwarz  [address] [place] [address]              Dear Any,    Your care is very important to us, and we have noticed that on 09/14/2023, you missed your appointment with Garrett RODRIGUEZ at [facility]       We’re committed to providing you with the best care possible. Your appointment time is reserved for you and your provider to discuss any current or new health concerns and, together, determine the best plan of care for you. Please call [phone] to reschedule at your earliest convenience.        In some cases, [facility] offers additional resources to make your healthcare more accessible, including transportation assistance, financial assistance and virtual visits. To learn more about these resources, please call [phone].       In order to keep you as informed as possible, below is a brief summary of our policy regarding missed appointments:        If a patient “No Shows”??three (3) or more appointments within a rolling 12-month           period, they may be dismissed from the practice for failure to follow clinician      recommendations.     If you have any concerns regarding the care you are receiving, please talk with your provider or call the office at [phone] and request to speak with the Practice . We’re committed to providing excellent care, and your feedback is invaluable.          Sincerely,  Garrett RODRIGUEZ

## 2023-09-28 ENCOUNTER — HOSPITAL ENCOUNTER (OUTPATIENT)
Dept: RADIOLOGY | Facility: MEDICAL CENTER | Age: 53
End: 2023-09-28
Attending: NURSE PRACTITIONER
Payer: COMMERCIAL

## 2023-09-28 DIAGNOSIS — N63.13 MASS OF LOWER OUTER QUADRANT OF RIGHT BREAST: ICD-10-CM

## 2023-09-28 PROCEDURE — 76642 ULTRASOUND BREAST LIMITED: CPT | Mod: RT

## 2023-09-28 PROCEDURE — G0279 TOMOSYNTHESIS, MAMMO: HCPCS

## 2023-10-09 ENCOUNTER — HOSPITAL ENCOUNTER (OUTPATIENT)
Dept: RADIOLOGY | Facility: MEDICAL CENTER | Age: 53
End: 2023-10-09
Attending: NURSE PRACTITIONER
Payer: COMMERCIAL

## 2023-10-09 DIAGNOSIS — R92.8 ABNORMAL FINDING ON BREAST IMAGING: ICD-10-CM

## 2023-10-09 LAB — PATHOLOGY CONSULT NOTE: NORMAL

## 2023-10-09 PROCEDURE — 88305 TISSUE EXAM BY PATHOLOGIST: CPT

## 2023-10-09 PROCEDURE — A4648 IMPLANTABLE TISSUE MARKER: HCPCS

## 2023-10-10 ENCOUNTER — TELEPHONE (OUTPATIENT)
Dept: RADIOLOGY | Facility: MEDICAL CENTER | Age: 53
End: 2023-10-10

## 2023-10-10 ENCOUNTER — TELEPHONE (OUTPATIENT)
Dept: RADIOLOGY | Facility: MEDICAL CENTER | Age: 53
End: 2023-10-10
Payer: COMMERCIAL

## 2023-10-20 NOTE — PROGRESS NOTES
"Subjective:   Any Schwarz is a 48 y.o. female here today for annual physical    Well adult health check  Patient is here today for annual physical; no acute findings on exam.  Chart is been reviewed and updated.  Patient is only reporting some mild arthralgias that are the same as they have been in the past.  Patient has not had any surgery since her last appointment.         Current medicines (including changes today)  Current Outpatient Prescriptions   Medication Sig Dispense Refill   • valACYclovir (VALTREX) 500 MG Tab Take 500 mg by mouth 2 times a day.     • vitamin D, Ergocalciferol, (DRISDOL) 91011 units Cap capsule TAKE ONE CAPSULE BY MOUTH ONCE EVERY 7 DAYS 12 Cap 1   • colesevelam (WELCHOL) 625 MG Tab TAKE THREE TABLETS BY MOUTH TWICE A DAY WITH FOOD 540 Tab 2   • citalopram (CELEXA) 20 MG Tab TAKE ONE AND ONE-HALF (1 & 1/2) TABLET BY MOUTH DAILY 135 Tab 3   • estradiol (ESTRACE) 2 MG Tab Take 2 mg by mouth every day.     • ESTROGENS CONJUGATED PO Take  by mouth.     • Colesevelam HCl (WELCHOL) 3.75 G PACK Take  by mouth every morning.       No current facility-administered medications for this visit.      She  has a past medical history of Anesthesia; Anxiety; Arthritis; Cancer ([facility]) (1990); Depression; Heart burn; High cholesterol; Other specified disorder of intestines; Other specified symptom associated with female genital organs; and Unspecified urinary incontinence. She also has no past medical history of Breast cancer ([facility]).    ROS   No chest pain, no shortness of breath, no abdominal pain  + Bilateral shoulder pain     Objective:     Blood pressure 104/64, pulse 72, temperature 36.3 °C (97.3 °F), height 1.651 m (5' 5\"), weight 78 kg (172 lb), SpO2 98 %. Body mass index is 28.62 kg/m².   Physical Exam:  Alert, oriented in no acute distress.  Eye contact is good, speech goal directed, affect calm  HEENT: conjunctiva non-injected, sclera non-icteric.  Pinna normal. TM pearly gray.   Oral " mucous membranes pink and moist with no lesions.  Neck No adenopathy or masses in the neck or supraclavicular regions.  Lungs: clear to auscultation bilaterally with good excursion.  CV: regular rate and rhythm.  Abdomen: soft, nontender, No CVAT  Ext: no edema, color normal, vascularity normal, temperature normal  Neuro: Cranial nerves II through XII grossly intact      Assessment and Plan:   The following treatment plan was discussed   1. Well adult health check  COMP METABOLIC PANEL    LIPID PROFILE    TSH    VITAMIN D,25 HYDROXY    No acute findings on exam; chart reviewed and updated.  Age-appropriate screening labs ordered; monitor for results   2. Need for vaccination  Flu Quad Inj >3 Year Pre-Filled PF    Age appropriate immunization provided; patient tolerated procedure well.       Followup: Return in about 1 year (around 9/21/2019) for Annual physical, Short.             Never

## 2023-10-31 DIAGNOSIS — E78.2 MIXED HYPERLIPIDEMIA: ICD-10-CM

## 2023-10-31 DIAGNOSIS — F32.A ANXIETY AND DEPRESSION: ICD-10-CM

## 2023-10-31 DIAGNOSIS — F41.9 ANXIETY AND DEPRESSION: ICD-10-CM

## 2023-11-02 RX ORDER — COLESEVELAM 180 1/1
TABLET ORAL
Qty: 540 TABLET | Refills: 0 | Status: SHIPPED | OUTPATIENT
Start: 2023-11-02 | End: 2024-02-04

## 2023-11-02 RX ORDER — VALACYCLOVIR HYDROCHLORIDE 1 G/1
TABLET, FILM COATED ORAL
Qty: 12 TABLET | Refills: 0 | Status: SHIPPED | OUTPATIENT
Start: 2023-11-02 | End: 2024-02-04

## 2023-11-02 RX ORDER — CITALOPRAM 20 MG/1
20 TABLET ORAL DAILY
Qty: 90 TABLET | Refills: 0 | Status: SHIPPED | OUTPATIENT
Start: 2023-11-02 | End: 2024-02-04

## 2023-11-02 NOTE — TELEPHONE ENCOUNTER
Requested Prescriptions     Pending Prescriptions Disp Refills   • citalopram (CELEXA) 20 MG Tab [Pharmacy Med Name: CITALOPRAM HBR 20 MG TABLET] 90 Tablet 0     Sig: TAKE ONE TABLET BY MOUTH DAILY   • colesevelam (WELCHOL) 625 MG Tab [Pharmacy Med Name: COLESEVELAM 625 MG TABLET] 540 Tablet 0     Sig: TAKE THREE TABLETS BY MOUTH TWICE A DAY WITH MEALS   • valacyclovir (VALTREX) 1 GM Tab [Pharmacy Med Name: valACYclovir HCL 1 GRAM TABLET] 12 Tablet 0     Sig: TAKE TWO TABLETS BY MOUTH EVERY 12 HOURS FOR ONE DAY AS NEEDED FOR COLD SORE FLARE       Deandra Chavez A.P.R.N.

## 2023-12-09 ENCOUNTER — HOSPITAL ENCOUNTER (OUTPATIENT)
Dept: LAB | Facility: MEDICAL CENTER | Age: 53
End: 2023-12-09
Attending: NURSE PRACTITIONER
Payer: COMMERCIAL

## 2023-12-09 DIAGNOSIS — D64.9 ANEMIA, UNSPECIFIED TYPE: ICD-10-CM

## 2023-12-09 DIAGNOSIS — Z00.00 ROUTINE HEALTH MAINTENANCE: ICD-10-CM

## 2023-12-09 DIAGNOSIS — Z11.4 SCREENING FOR HIV WITHOUT PRESENCE OF RISK FACTORS: ICD-10-CM

## 2023-12-09 DIAGNOSIS — E66.09 CLASS 1 OBESITY DUE TO EXCESS CALORIES WITH SERIOUS COMORBIDITY AND BODY MASS INDEX (BMI) OF 32.0 TO 32.9 IN ADULT: ICD-10-CM

## 2023-12-09 DIAGNOSIS — L65.9 THINNING HAIR: ICD-10-CM

## 2023-12-09 DIAGNOSIS — Z11.59 NEED FOR HEPATITIS C SCREENING TEST: ICD-10-CM

## 2023-12-09 LAB
BASOPHILS # BLD AUTO: 1.8 % (ref 0–1.8)
BASOPHILS # BLD: 0.11 K/UL (ref 0–0.12)
EOSINOPHIL # BLD AUTO: 0.28 K/UL (ref 0–0.51)
EOSINOPHIL NFR BLD: 4.7 % (ref 0–6.9)
ERYTHROCYTE [DISTWIDTH] IN BLOOD BY AUTOMATED COUNT: 47.1 FL (ref 35.9–50)
FERRITIN SERPL-MCNC: 44.1 NG/ML (ref 10–291)
HCT VFR BLD AUTO: 44.5 % (ref 37–47)
HCV AB SER QL: NORMAL
HGB BLD-MCNC: 14.6 G/DL (ref 12–16)
HIV 1+2 AB+HIV1 P24 AG SERPL QL IA: NORMAL
IMM GRANULOCYTES # BLD AUTO: 0.01 K/UL (ref 0–0.11)
IMM GRANULOCYTES NFR BLD AUTO: 0.2 % (ref 0–0.9)
IRON SATN MFR SERPL: 24 % (ref 15–55)
IRON SERPL-MCNC: 96 UG/DL (ref 40–170)
LYMPHOCYTES # BLD AUTO: 2.09 K/UL (ref 1–4.8)
LYMPHOCYTES NFR BLD: 34.9 % (ref 22–41)
MCH RBC QN AUTO: 29 PG (ref 27–33)
MCHC RBC AUTO-ENTMCNC: 32.8 G/DL (ref 32.2–35.5)
MCV RBC AUTO: 88.3 FL (ref 81.4–97.8)
MONOCYTES # BLD AUTO: 0.39 K/UL (ref 0–0.85)
MONOCYTES NFR BLD AUTO: 6.5 % (ref 0–13.4)
NEUTROPHILS # BLD AUTO: 3.1 K/UL (ref 1.82–7.42)
NEUTROPHILS NFR BLD: 51.9 % (ref 44–72)
NRBC # BLD AUTO: 0 K/UL
NRBC BLD-RTO: 0 /100 WBC (ref 0–0.2)
PLATELET # BLD AUTO: 253 K/UL (ref 164–446)
PMV BLD AUTO: 11.6 FL (ref 9–12.9)
RBC # BLD AUTO: 5.04 M/UL (ref 4.2–5.4)
T3 SERPL-MCNC: 109 NG/DL (ref 60–181)
T3FREE SERPL-MCNC: 3.07 PG/ML (ref 2–4.4)
T4 FREE SERPL-MCNC: 0.84 NG/DL (ref 0.93–1.7)
TIBC SERPL-MCNC: 403 UG/DL (ref 250–450)
TSH SERPL DL<=0.005 MIU/L-ACNC: 1.31 UIU/ML (ref 0.38–5.33)
UIBC SERPL-MCNC: 307 UG/DL (ref 110–370)
WBC # BLD AUTO: 6 K/UL (ref 4.8–10.8)

## 2023-12-09 PROCEDURE — 87389 HIV-1 AG W/HIV-1&-2 AB AG IA: CPT

## 2023-12-09 PROCEDURE — 84439 ASSAY OF FREE THYROXINE: CPT

## 2023-12-09 PROCEDURE — 36415 COLL VENOUS BLD VENIPUNCTURE: CPT

## 2023-12-09 PROCEDURE — 86800 THYROGLOBULIN ANTIBODY: CPT

## 2023-12-09 PROCEDURE — 84481 FREE ASSAY (FT-3): CPT

## 2023-12-09 PROCEDURE — 86376 MICROSOMAL ANTIBODY EACH: CPT

## 2023-12-09 PROCEDURE — 86803 HEPATITIS C AB TEST: CPT

## 2023-12-09 PROCEDURE — 82728 ASSAY OF FERRITIN: CPT

## 2023-12-09 PROCEDURE — 85025 COMPLETE CBC W/AUTO DIFF WBC: CPT

## 2023-12-09 PROCEDURE — 83550 IRON BINDING TEST: CPT

## 2023-12-09 PROCEDURE — 84480 ASSAY TRIIODOTHYRONINE (T3): CPT

## 2023-12-09 PROCEDURE — 83540 ASSAY OF IRON: CPT

## 2023-12-09 PROCEDURE — 84443 ASSAY THYROID STIM HORMONE: CPT

## 2023-12-10 LAB — THYROPEROXIDASE AB SERPL-ACNC: <9 IU/ML (ref 0–9)

## 2023-12-12 LAB — THYROGLOB AB SERPL-ACNC: <0.9 IU/ML (ref 0–4)

## 2023-12-19 ENCOUNTER — OFFICE VISIT (OUTPATIENT)
Dept: MEDICAL GROUP | Facility: PHYSICIAN GROUP | Age: 53
End: 2023-12-19
Payer: COMMERCIAL

## 2023-12-19 VITALS
HEIGHT: 65 IN | SYSTOLIC BLOOD PRESSURE: 102 MMHG | TEMPERATURE: 99 F | HEART RATE: 78 BPM | DIASTOLIC BLOOD PRESSURE: 64 MMHG | OXYGEN SATURATION: 96 % | WEIGHT: 204 LBS | BODY MASS INDEX: 33.99 KG/M2

## 2023-12-19 DIAGNOSIS — E03.9 ACQUIRED HYPOTHYROIDISM: ICD-10-CM

## 2023-12-19 PROCEDURE — 3074F SYST BP LT 130 MM HG: CPT | Performed by: NURSE PRACTITIONER

## 2023-12-19 PROCEDURE — 99214 OFFICE O/P EST MOD 30 MIN: CPT | Performed by: NURSE PRACTITIONER

## 2023-12-19 PROCEDURE — 3078F DIAST BP <80 MM HG: CPT | Performed by: NURSE PRACTITIONER

## 2023-12-19 RX ORDER — LEVOTHYROXINE SODIUM 0.03 MG/1
25 TABLET ORAL
Qty: 90 TABLET | Refills: 0 | Status: SHIPPED | OUTPATIENT
Start: 2023-12-19 | End: 2024-02-04

## 2023-12-19 ASSESSMENT — FIBROSIS 4 INDEX: FIB4 SCORE: 1.03

## 2023-12-19 NOTE — PROGRESS NOTES
CC: The encounter diagnosis was Acquired hypothyroidism.                                                                                                                                       HPI:   Any presents today with the following concerns:    Acquired hypothyroidism  New to examiner and patient, noted on recent labs.  TSH within normal range at 1.310, free T4 low at 0.84.    Patient Active Problem List    Diagnosis Date Noted    Acquired hypothyroidism 12/19/2023    Umbilical hernia 09/19/2023    Mass of lower outer quadrant of right breast 09/19/2023    Class 1 obesity due to excess calories with serious comorbidity and body mass index (BMI) of 32.0 to 32.9 in adult 05/10/2022    Statin intolerance 03/30/2021    Thinning hair 02/25/2021    Gastroesophageal reflux disease without esophagitis 02/25/2021    Stress incontinence 02/25/2021    Cold sore 02/25/2021    Elevated fasting glucose 08/25/2020    Memory loss 02/20/2020    Chronic pain of both shoulders 04/10/2019    Left arm pain 06/14/2017    Arthralgia 06/10/2016    Vitamin D deficiency 11/01/2015    Mixed hyperlipidemia 10/23/2015    Anxiety and depression 10/15/2015     Current Outpatient Medications   Medication Sig Dispense Refill    levothyroxine (SYNTHROID) 25 MCG Tab Take 1 Tablet by mouth every morning on an empty stomach. 90 Tablet 0    citalopram (CELEXA) 20 MG Tab TAKE ONE TABLET BY MOUTH DAILY 90 Tablet 0    colesevelam (WELCHOL) 625 MG Tab TAKE THREE TABLETS BY MOUTH TWICE A DAY WITH MEALS 540 Tablet 0    valacyclovir (VALTREX) 1 GM Tab TAKE TWO TABLETS BY MOUTH EVERY 12 HOURS FOR ONE DAY AS NEEDED FOR COLD SORE FLARE 12 Tablet 0    Multiple Vitamin (MULTIVITAMIN PO) Take  by mouth.      VITAMIN D PO Take 5,000 Units by mouth every day.      Biotin 1000 MCG Tab Take 1,000 mcg by mouth every day.      Apoaequorin (PREVAGEN PO) Take 1 Tablet by mouth every day.       No current facility-administered medications for this visit.     Allergies  "as of 12/19/2023 - Reviewed 12/19/2023   Allergen Reaction Noted    Minocycline Hives 06/20/2014    Statins [hmg-coa-r inhibitors]  03/30/2021      ROS:  Constitutional: No fevers, chills.  + Malaise/fatigue.  Eyes: No eye pain.  ENT: No sore throat, congestion.   Resp: No cough, shortness of breath.  CV: No chest pain, leg swelling, palpitations.  GI: No nausea/vomiting, abdominal pain, constipation, diarrhea.  : No dysuria, hematuria.  MSK: + Polyarthralgia.  No weakness.  Skin: + Hair thinning.  No rashes.  Neuro: No dizziness, weakness, headaches.  Psych: No suicidal ideations.    All remaining systems reviewed and found to be negative, except as stated above.      /64   Pulse 78   Temp 37.2 °C (99 °F)   Ht 1.651 m (5' 5\")   Wt 92.5 kg (204 lb)   LMP 05/01/2014   SpO2 96%   BMI 33.95 kg/m²     Physical Exam:  General: Well nourished, well developed female in NAD, awake and conversant.  Eyes: Normal conjunctiva, anicteric.  Round symmetrical pupils.  ENT: Hearing grossly intact.  No nasal discharge.  Neck: Neck is supple.  No masses or thyromegaly.  CV: No lower extremity edema.  Respiratory: Respirations are nonlabored.  No wheezing.  Abdomen: Non-Distended.  Skin: Warm.  No rashes or ulcers.  MSK: Normal ambulation.  No clubbing or cyanosis.  Neuro: Sensation and CN II-XII grossly normal.  Psych: Alert and oriented.  Cooperative, appropriate mood and affect, normal judgment.      Assessment and Plan.   53 y.o. female with the following issues:  1. Acquired hypothyroidism  New to examiner and patient.  Plan to start levothyroxine 25 mcg daily, in the morning on an empty stomach 30 minutes before eating.  Recommend moving all supplements to the evening to avoid interaction with medication.  Plan to repeat labs in 2 months and follow-up to review.  - levothyroxine (SYNTHROID) 25 MCG Tab; Take 1 Tablet by mouth every morning on an empty stomach.  Dispense: 90 Tablet; Refill: 0  - T3 FREE; Future  - " FREE THYROXINE; Future  - TSH; Future     Return in about 2 months (around 2/19/2024) for Virtual Visit, Thyroid, Follow up Labs.     Please note that this dictation was created using voice recognition software. I have worked with consultants from the vendor as well as technical experts from [facility] to optimize the interface. I have made every reasonable attempt to correct obvious errors, but I expect that there are errors of grammar and possibly content that I did not discover before finalizing the note.

## 2023-12-19 NOTE — ASSESSMENT & PLAN NOTE
New to examiner and patient, noted on recent labs.  TSH within normal range at 1.310, free T4 low at 0.84.

## 2024-02-03 DIAGNOSIS — E03.9 ACQUIRED HYPOTHYROIDISM: ICD-10-CM

## 2024-02-03 DIAGNOSIS — B00.1 COLD SORE: ICD-10-CM

## 2024-02-03 DIAGNOSIS — F41.9 ANXIETY AND DEPRESSION: ICD-10-CM

## 2024-02-03 DIAGNOSIS — E78.2 MIXED HYPERLIPIDEMIA: ICD-10-CM

## 2024-02-03 DIAGNOSIS — F32.A ANXIETY AND DEPRESSION: ICD-10-CM

## 2024-02-04 RX ORDER — LEVOTHYROXINE SODIUM 0.03 MG/1
25 TABLET ORAL
Qty: 90 TABLET | Refills: 0 | Status: SHIPPED | OUTPATIENT
Start: 2024-02-04 | End: 2024-02-20

## 2024-02-04 RX ORDER — CITALOPRAM 20 MG/1
20 TABLET ORAL DAILY
Qty: 90 TABLET | Refills: 0 | Status: SHIPPED | OUTPATIENT
Start: 2024-02-04

## 2024-02-04 RX ORDER — COLESEVELAM 180 1/1
TABLET ORAL
Qty: 540 TABLET | Refills: 0 | Status: SHIPPED | OUTPATIENT
Start: 2024-02-04

## 2024-02-04 RX ORDER — VALACYCLOVIR HYDROCHLORIDE 1 G/1
TABLET, FILM COATED ORAL
Qty: 12 TABLET | Refills: 0 | Status: SHIPPED | OUTPATIENT
Start: 2024-02-04

## 2024-02-04 NOTE — TELEPHONE ENCOUNTER
Requested Prescriptions     Pending Prescriptions Disp Refills    colesevelam (WELCHOL) 625 MG Tab [Pharmacy Med Name: COLESEVELAM 625 MG TABLET] 540 Tablet 0     Sig: TAKE 3 TABLETS BY MOUTH TWICE A DAY WITH A MEAL    levothyroxine (SYNTHROID) 25 MCG Tab [Pharmacy Med Name: LEVOTHYROXINE 25 MCG TABLET] 90 Tablet 0     Sig: TAKE ONE TABLET BY MOUTH EVERY MORNING ON AN EMPTY STOMACH    valacyclovir (VALTREX) 1 GM Tab [Pharmacy Med Name: valACYclovir HCL 1 GRAM TABLET] 12 Tablet 0     Sig: TAKE TWO TABLETS BY MOUTH EVERY 12 HOURS FOR ONE DAY AS NEEDED FO COLD SORE FLARE    citalopram (CELEXA) 20 MG Tab [Pharmacy Med Name: CITALOPRAM HBR 20 MG TABLET] 90 Tablet 0     Sig: TAKE 1 TABLET BY MOUTH DAILY       MIKE Bryant.

## 2024-02-20 ENCOUNTER — TELEMEDICINE (OUTPATIENT)
Dept: MEDICAL GROUP | Facility: PHYSICIAN GROUP | Age: 54
End: 2024-02-20
Payer: COMMERCIAL

## 2024-02-20 VITALS — BODY MASS INDEX: 33.32 KG/M2 | WEIGHT: 200 LBS | HEIGHT: 65 IN

## 2024-02-20 DIAGNOSIS — E03.9 ACQUIRED HYPOTHYROIDISM: ICD-10-CM

## 2024-02-20 PROCEDURE — 99214 OFFICE O/P EST MOD 30 MIN: CPT | Mod: 95 | Performed by: NURSE PRACTITIONER

## 2024-02-20 ASSESSMENT — FIBROSIS 4 INDEX: FIB4 SCORE: 1.03

## 2024-02-20 ASSESSMENT — PATIENT HEALTH QUESTIONNAIRE - PHQ9: CLINICAL INTERPRETATION OF PHQ2 SCORE: 0

## 2024-02-21 NOTE — PROGRESS NOTES
Virtual Visit: Established Patient   This visit was conducted via Zoom using secure and encrypted videoconferencing technology.   The patient was in their home in the state of [place].    The patient's identity was confirmed and verbal consent was obtained for this virtual visit.     Subjective:   CC:   Chief Complaint   Patient presents with    Lab Results     Any Schwarz is a 53 y.o. female presenting for evaluation and management of:    Acquired hypothyroidism  New to examiner and patient, noted on recent labs.  TSH within normal range at 1.310, free T4 low at 0.84. Currently taking levothyroxine 25 mcg/day.     ROS   Constitutional: No fevers, chills, malaise/fatigue.  Eyes: No eye pain.  ENT: No sore throat, congestion.   Resp: No cough, shortness of breath.  CV: No chest pain, leg swelling, palpitations.  GI: No nausea/vomiting, abdominal pain, constipation, diarrhea.  : No dysuria, hematuria.  MSK: No weakness.  Skin: No rashes.  Neuro: No dizziness, weakness, headaches.  Psych: No suicidal ideations.    All remaining systems reviewed and found to be negative, except as stated above.      Current medicines (including changes today)  Current Outpatient Medications   Medication Sig Dispense Refill    Levothyroxine Sodium 37.5 MCG Cap Take 37.5 mcg by mouth every morning before breakfast. 90 Capsule 1    colesevelam (WELCHOL) 625 MG Tab TAKE 3 TABLETS BY MOUTH TWICE A DAY WITH A MEAL 540 Tablet 0    valacyclovir (VALTREX) 1 GM Tab TAKE TWO TABLETS BY MOUTH EVERY 12 HOURS FOR ONE DAY AS NEEDED FO COLD SORE FLARE 12 Tablet 0    citalopram (CELEXA) 20 MG Tab TAKE 1 TABLET BY MOUTH DAILY 90 Tablet 0    Multiple Vitamin (MULTIVITAMIN PO) Take  by mouth.      VITAMIN D PO Take 5,000 Units by mouth every day.      Apoaequorin (PREVAGEN PO) Take 1 Tablet by mouth every day.       No current facility-administered medications for this visit.     Patient Active Problem List    Diagnosis Date Noted    Acquired  "hypothyroidism 12/19/2023    Umbilical hernia 09/19/2023    Mass of lower outer quadrant of right breast 09/19/2023    Class 1 obesity due to excess calories with serious comorbidity and body mass index (BMI) of 32.0 to 32.9 in adult 05/10/2022    Statin intolerance 03/30/2021    Thinning hair 02/25/2021    Gastroesophageal reflux disease without esophagitis 02/25/2021    Stress incontinence 02/25/2021    Cold sore 02/25/2021    Elevated fasting glucose 08/25/2020    Memory loss 02/20/2020    Chronic pain of both shoulders 04/10/2019    Left arm pain 06/14/2017    Arthralgia 06/10/2016    Vitamin D deficiency 11/01/2015    Mixed hyperlipidemia 10/23/2015    Anxiety and depression 10/15/2015      Objective:   Ht 1.651 m (5' 5\") Comment: per pt  Wt 90.7 kg (200 lb) Comment: per pt  LMP 05/01/2014   BMI 33.28 kg/m²     Physical Exam:  Constitutional: Alert, no distress, well-groomed.  Skin: No rashes in visible areas.  Eye: Round. Conjunctiva clear, lids normal. No icterus.   ENMT: Lips pink without lesions, good dentition, moist mucous membranes. Phonation normal.  Neck: No masses, no thyromegaly. Moves freely without pain.  Respiratory: Unlabored respiratory effort, no cough or audible wheeze  Psych: Alert and oriented x3, normal affect and mood.     Assessment and Plan:   The following treatment plan was discussed:   1. Acquired hypothyroidism  Chronic, ongoing. Discontinue levothyroxine 25 mcg/day and start 37.5 mcg/day, repeat labs in about 2 months.  - Levothyroxine Sodium 37.5 MCG Cap; Take 37.5 mcg by mouth every morning before breakfast.  Dispense: 90 Capsule; Refill: 1  - T3 FREE; Future  - FREE THYROXINE; Future  - TSH; Future     Follow-up: Return in 7 months (on 9/23/2024) for Preventative Annual.       Please note that this dictation was created using voice recognition software. I have worked with consultants from the vendor as well as technical experts from [employer] to optimize the interface. I " have made every reasonable attempt to correct obvious errors, but I expect that there are errors of grammar and possibly content that I did not discover before finalizing the note.

## 2024-02-25 NOTE — ASSESSMENT & PLAN NOTE
New to examiner and patient, noted on recent labs.  TSH within normal range at 1.310, free T4 low at 0.84. Currently taking levothyroxine 25 mcg/day.

## 2024-03-17 DIAGNOSIS — E03.9 ACQUIRED HYPOTHYROIDISM: ICD-10-CM

## 2024-03-17 NOTE — PROGRESS NOTES
Requested Prescriptions     Signed Prescriptions Disp Refills    Levothyroxine Sodium 37.5 MCG Cap 90 Capsule 1     Sig: Take 37.5 mcg by mouth every morning before breakfast.       MIKE Bryant.

## 2024-03-25 DIAGNOSIS — E03.9 ACQUIRED HYPOTHYROIDISM: ICD-10-CM

## 2024-03-25 RX ORDER — LEVOTHYROXINE SODIUM 0.03 MG/1
37.5 TABLET ORAL
Qty: 135 TABLET | Refills: 0 | Status: SHIPPED | OUTPATIENT
Start: 2024-03-25

## 2024-03-26 NOTE — PROGRESS NOTES
1. Acquired hypothyroidism  - levothyroxine (SYNTHROID) 25 MCG Tab; Take 1.5 Tablets by mouth every morning on an empty stomach.  Dispense: 135 Tablet; Refill: 0

## 2024-05-11 DIAGNOSIS — F41.9 ANXIETY AND DEPRESSION: ICD-10-CM

## 2024-05-11 DIAGNOSIS — F32.A ANXIETY AND DEPRESSION: ICD-10-CM

## 2024-05-11 DIAGNOSIS — E78.2 MIXED HYPERLIPIDEMIA: ICD-10-CM

## 2024-05-11 RX ORDER — COLESEVELAM 180 1/1
TABLET ORAL
Qty: 540 TABLET | Refills: 1 | Status: SHIPPED | OUTPATIENT
Start: 2024-05-11

## 2024-05-11 RX ORDER — CITALOPRAM 20 MG/1
20 TABLET ORAL DAILY
Qty: 90 TABLET | Refills: 1 | Status: SHIPPED | OUTPATIENT
Start: 2024-05-11

## 2024-05-12 NOTE — TELEPHONE ENCOUNTER
Requested Prescriptions     Pending Prescriptions Disp Refills    colesevelam (WELCHOL) 625 MG Tab [Pharmacy Med Name: COLESEVELAM 625 MG TABLET] 540 Tablet 1     Sig: TAKE 3 TABLETS BY MOUTH TWICE A DAY WITH A MEAL    citalopram (CELEXA) 20 MG Tab [Pharmacy Med Name: CITALOPRAM HBR 20 MG TABLET] 90 Tablet 1     Sig: TAKE 1 TABLET BY MOUTH DAILY       MIKE Bryant.

## 2024-07-01 ENCOUNTER — HOSPITAL ENCOUNTER (OUTPATIENT)
Dept: LAB | Facility: MEDICAL CENTER | Age: 54
End: 2024-07-01
Attending: NURSE PRACTITIONER
Payer: COMMERCIAL

## 2024-07-01 DIAGNOSIS — E03.9 ACQUIRED HYPOTHYROIDISM: ICD-10-CM

## 2024-07-01 LAB
T3FREE SERPL-MCNC: 2.76 PG/ML (ref 2–4.4)
T4 FREE SERPL-MCNC: 1.03 NG/DL (ref 0.93–1.7)
TSH SERPL DL<=0.005 MIU/L-ACNC: 0.55 UIU/ML (ref 0.38–5.33)

## 2024-07-01 PROCEDURE — 36415 COLL VENOUS BLD VENIPUNCTURE: CPT

## 2024-07-01 PROCEDURE — 84481 FREE ASSAY (FT-3): CPT

## 2024-07-01 PROCEDURE — 84443 ASSAY THYROID STIM HORMONE: CPT

## 2024-07-01 PROCEDURE — 84439 ASSAY OF FREE THYROXINE: CPT

## 2024-07-07 DIAGNOSIS — E03.9 ACQUIRED HYPOTHYROIDISM: ICD-10-CM

## 2024-07-07 RX ORDER — LEVOTHYROXINE SODIUM 0.03 MG/1
37.5 TABLET ORAL
Qty: 135 TABLET | Refills: 3 | Status: SHIPPED | OUTPATIENT
Start: 2024-07-07

## 2024-08-27 ENCOUNTER — APPOINTMENT (OUTPATIENT)
Dept: RADIOLOGY | Facility: MEDICAL CENTER | Age: 54
End: 2024-08-27
Attending: STUDENT IN AN ORGANIZED HEALTH CARE EDUCATION/TRAINING PROGRAM
Payer: COMMERCIAL

## 2024-08-27 DIAGNOSIS — D17.71 BENIGN LIPOMATOUS NEOPLASM OF KIDNEY: ICD-10-CM

## 2024-08-27 PROCEDURE — 76775 US EXAM ABDO BACK WALL LIM: CPT

## 2024-09-05 ENCOUNTER — PATIENT MESSAGE (OUTPATIENT)
Dept: MEDICAL GROUP | Facility: PHYSICIAN GROUP | Age: 54
End: 2024-09-05
Payer: COMMERCIAL

## 2024-09-05 DIAGNOSIS — R73.01 ELEVATED FASTING GLUCOSE: ICD-10-CM

## 2024-09-05 DIAGNOSIS — E78.2 MIXED HYPERLIPIDEMIA: ICD-10-CM

## 2024-09-05 DIAGNOSIS — E55.9 VITAMIN D DEFICIENCY: ICD-10-CM

## 2024-09-05 DIAGNOSIS — Z00.00 ROUTINE HEALTH MAINTENANCE: ICD-10-CM

## 2024-09-16 ENCOUNTER — HOSPITAL ENCOUNTER (OUTPATIENT)
Dept: LAB | Facility: MEDICAL CENTER | Age: 54
End: 2024-09-16
Attending: NURSE PRACTITIONER
Payer: COMMERCIAL

## 2024-09-16 DIAGNOSIS — E78.2 MIXED HYPERLIPIDEMIA: ICD-10-CM

## 2024-09-16 DIAGNOSIS — Z00.00 ROUTINE HEALTH MAINTENANCE: ICD-10-CM

## 2024-09-16 DIAGNOSIS — R73.01 ELEVATED FASTING GLUCOSE: ICD-10-CM

## 2024-09-16 DIAGNOSIS — E03.9 ACQUIRED HYPOTHYROIDISM: ICD-10-CM

## 2024-09-16 DIAGNOSIS — E55.9 VITAMIN D DEFICIENCY: ICD-10-CM

## 2024-09-16 LAB
25(OH)D3 SERPL-MCNC: 35 NG/ML (ref 30–100)
ALBUMIN SERPL BCP-MCNC: 4.1 G/DL (ref 3.2–4.9)
ALBUMIN/GLOB SERPL: 1.6 G/DL
ALP SERPL-CCNC: 98 U/L (ref 30–99)
ALT SERPL-CCNC: 36 U/L (ref 2–50)
ANION GAP SERPL CALC-SCNC: 12 MMOL/L (ref 7–16)
AST SERPL-CCNC: 32 U/L (ref 12–45)
BASOPHILS # BLD AUTO: 1.6 % (ref 0–1.8)
BASOPHILS # BLD: 0.12 K/UL (ref 0–0.12)
BILIRUB SERPL-MCNC: 0.3 MG/DL (ref 0.1–1.5)
BUN SERPL-MCNC: 12 MG/DL (ref 8–22)
CALCIUM ALBUM COR SERPL-MCNC: 9.2 MG/DL (ref 8.5–10.5)
CALCIUM SERPL-MCNC: 9.3 MG/DL (ref 8.5–10.5)
CHLORIDE SERPL-SCNC: 105 MMOL/L (ref 96–112)
CHOLEST SERPL-MCNC: 206 MG/DL (ref 100–199)
CO2 SERPL-SCNC: 22 MMOL/L (ref 20–33)
CREAT SERPL-MCNC: 0.82 MG/DL (ref 0.5–1.4)
EOSINOPHIL # BLD AUTO: 0.32 K/UL (ref 0–0.51)
EOSINOPHIL NFR BLD: 4.3 % (ref 0–6.9)
ERYTHROCYTE [DISTWIDTH] IN BLOOD BY AUTOMATED COUNT: 47.8 FL (ref 35.9–50)
EST. AVERAGE GLUCOSE BLD GHB EST-MCNC: 126 MG/DL
FASTING STATUS PATIENT QL REPORTED: NORMAL
GFR SERPLBLD CREATININE-BSD FMLA CKD-EPI: 84 ML/MIN/1.73 M 2
GLOBULIN SER CALC-MCNC: 2.5 G/DL (ref 1.9–3.5)
GLUCOSE SERPL-MCNC: 95 MG/DL (ref 65–99)
HBA1C MFR BLD: 6 % (ref 4–5.6)
HCT VFR BLD AUTO: 43.8 % (ref 37–47)
HDLC SERPL-MCNC: 53 MG/DL
HGB BLD-MCNC: 14.3 G/DL (ref 12–16)
IMM GRANULOCYTES # BLD AUTO: 0.01 K/UL (ref 0–0.11)
IMM GRANULOCYTES NFR BLD AUTO: 0.1 % (ref 0–0.9)
LDLC SERPL CALC-MCNC: 132 MG/DL
LYMPHOCYTES # BLD AUTO: 2.53 K/UL (ref 1–4.8)
LYMPHOCYTES NFR BLD: 33.9 % (ref 22–41)
MCH RBC QN AUTO: 30.2 PG (ref 27–33)
MCHC RBC AUTO-ENTMCNC: 32.6 G/DL (ref 32.2–35.5)
MCV RBC AUTO: 92.4 FL (ref 81.4–97.8)
MONOCYTES # BLD AUTO: 0.65 K/UL (ref 0–0.85)
MONOCYTES NFR BLD AUTO: 8.7 % (ref 0–13.4)
NEUTROPHILS # BLD AUTO: 3.84 K/UL (ref 1.82–7.42)
NEUTROPHILS NFR BLD: 51.4 % (ref 44–72)
NRBC # BLD AUTO: 0 K/UL
NRBC BLD-RTO: 0 /100 WBC (ref 0–0.2)
PLATELET # BLD AUTO: 284 K/UL (ref 164–446)
PMV BLD AUTO: 11.5 FL (ref 9–12.9)
POTASSIUM SERPL-SCNC: 5 MMOL/L (ref 3.6–5.5)
PROT SERPL-MCNC: 6.6 G/DL (ref 6–8.2)
RBC # BLD AUTO: 4.74 M/UL (ref 4.2–5.4)
SODIUM SERPL-SCNC: 139 MMOL/L (ref 135–145)
T3FREE SERPL-MCNC: 3.29 PG/ML (ref 2–4.4)
T4 FREE SERPL-MCNC: 1.04 NG/DL (ref 0.93–1.7)
TRIGL SERPL-MCNC: 103 MG/DL (ref 0–149)
TSH SERPL-ACNC: 1.38 UIU/ML (ref 0.35–5.5)
WBC # BLD AUTO: 7.5 K/UL (ref 4.8–10.8)

## 2024-09-16 PROCEDURE — 84443 ASSAY THYROID STIM HORMONE: CPT

## 2024-09-16 PROCEDURE — 83036 HEMOGLOBIN GLYCOSYLATED A1C: CPT

## 2024-09-16 PROCEDURE — 82306 VITAMIN D 25 HYDROXY: CPT

## 2024-09-16 PROCEDURE — 84481 FREE ASSAY (FT-3): CPT

## 2024-09-16 PROCEDURE — 85025 COMPLETE CBC W/AUTO DIFF WBC: CPT

## 2024-09-16 PROCEDURE — 36415 COLL VENOUS BLD VENIPUNCTURE: CPT

## 2024-09-16 PROCEDURE — 84439 ASSAY OF FREE THYROXINE: CPT

## 2024-09-16 PROCEDURE — 80061 LIPID PANEL: CPT

## 2024-09-16 PROCEDURE — 80053 COMPREHEN METABOLIC PANEL: CPT

## 2024-09-22 SDOH — ECONOMIC STABILITY: FOOD INSECURITY: WITHIN THE PAST 12 MONTHS, YOU WORRIED THAT YOUR FOOD WOULD RUN OUT BEFORE YOU GOT MONEY TO BUY MORE.: NEVER TRUE

## 2024-09-22 SDOH — ECONOMIC STABILITY: INCOME INSECURITY: IN THE LAST 12 MONTHS, WAS THERE A TIME WHEN YOU WERE NOT ABLE TO PAY THE MORTGAGE OR RENT ON TIME?: NO

## 2024-09-22 SDOH — ECONOMIC STABILITY: INCOME INSECURITY: HOW HARD IS IT FOR YOU TO PAY FOR THE VERY BASICS LIKE FOOD, HOUSING, MEDICAL CARE, AND HEATING?: NOT HARD AT ALL

## 2024-09-22 SDOH — HEALTH STABILITY: MENTAL HEALTH
STRESS IS WHEN SOMEONE FEELS TENSE, NERVOUS, ANXIOUS, OR CAN'T SLEEP AT NIGHT BECAUSE THEIR MIND IS TROUBLED. HOW STRESSED ARE YOU?: TO SOME EXTENT

## 2024-09-22 SDOH — HEALTH STABILITY: PHYSICAL HEALTH: ON AVERAGE, HOW MANY MINUTES DO YOU ENGAGE IN EXERCISE AT THIS LEVEL?: 20 MIN

## 2024-09-22 SDOH — HEALTH STABILITY: PHYSICAL HEALTH: ON AVERAGE, HOW MANY DAYS PER WEEK DO YOU ENGAGE IN MODERATE TO STRENUOUS EXERCISE (LIKE A BRISK WALK)?: 2 DAYS

## 2024-09-22 SDOH — ECONOMIC STABILITY: FOOD INSECURITY: WITHIN THE PAST 12 MONTHS, THE FOOD YOU BOUGHT JUST DIDN'T LAST AND YOU DIDN'T HAVE MONEY TO GET MORE.: NEVER TRUE

## 2024-09-22 ASSESSMENT — SOCIAL DETERMINANTS OF HEALTH (SDOH)
HOW OFTEN DO YOU HAVE A DRINK CONTAINING ALCOHOL: 4 OR MORE TIMES A WEEK
HOW MANY DRINKS CONTAINING ALCOHOL DO YOU HAVE ON A TYPICAL DAY WHEN YOU ARE DRINKING: 1 OR 2
HOW OFTEN DO YOU ATTEND CHURCH OR RELIGIOUS SERVICES?: 1 TO 4 TIMES PER YEAR
ARE YOU MARRIED, WIDOWED, DIVORCED, SEPARATED, NEVER MARRIED, OR LIVING WITH A PARTNER?: NEVER MARRIED
WITHIN THE PAST 12 MONTHS, YOU WORRIED THAT YOUR FOOD WOULD RUN OUT BEFORE YOU GOT THE MONEY TO BUY MORE: NEVER TRUE
DO YOU BELONG TO ANY CLUBS OR ORGANIZATIONS SUCH AS CHURCH GROUPS UNIONS, FRATERNAL OR ATHLETIC GROUPS, OR SCHOOL GROUPS?: NO
HOW OFTEN DO YOU GET TOGETHER WITH FRIENDS OR RELATIVES?: ONCE A WEEK
HOW OFTEN DO YOU ATTENT MEETINGS OF THE CLUB OR ORGANIZATION YOU BELONG TO?: NEVER
IN THE PAST 12 MONTHS, HAS THE ELECTRIC, GAS, OIL, OR WATER COMPANY THREATENED TO SHUT OFF SERVICE IN YOUR HOME?: NO
IN A TYPICAL WEEK, HOW MANY TIMES DO YOU TALK ON THE PHONE WITH FAMILY, FRIENDS, OR NEIGHBORS?: TWICE A WEEK
HOW OFTEN DO YOU HAVE SIX OR MORE DRINKS ON ONE OCCASION: NEVER
HOW OFTEN DO YOU ATTENT MEETINGS OF THE CLUB OR ORGANIZATION YOU BELONG TO?: NEVER
DO YOU BELONG TO ANY CLUBS OR ORGANIZATIONS SUCH AS CHURCH GROUPS UNIONS, FRATERNAL OR ATHLETIC GROUPS, OR SCHOOL GROUPS?: NO
HOW OFTEN DO YOU ATTEND CHURCH OR RELIGIOUS SERVICES?: 1 TO 4 TIMES PER YEAR
HOW HARD IS IT FOR YOU TO PAY FOR THE VERY BASICS LIKE FOOD, HOUSING, MEDICAL CARE, AND HEATING?: NOT HARD AT ALL
HOW OFTEN DO YOU GET TOGETHER WITH FRIENDS OR RELATIVES?: ONCE A WEEK
ARE YOU MARRIED, WIDOWED, DIVORCED, SEPARATED, NEVER MARRIED, OR LIVING WITH A PARTNER?: NEVER MARRIED
IN A TYPICAL WEEK, HOW MANY TIMES DO YOU TALK ON THE PHONE WITH FAMILY, FRIENDS, OR NEIGHBORS?: TWICE A WEEK

## 2024-09-22 ASSESSMENT — LIFESTYLE VARIABLES
SKIP TO QUESTIONS 9-10: 1
HOW MANY STANDARD DRINKS CONTAINING ALCOHOL DO YOU HAVE ON A TYPICAL DAY: 1 OR 2
HOW OFTEN DO YOU HAVE A DRINK CONTAINING ALCOHOL: 4 OR MORE TIMES A WEEK
AUDIT-C TOTAL SCORE: 4
HOW OFTEN DO YOU HAVE SIX OR MORE DRINKS ON ONE OCCASION: NEVER

## 2024-09-23 ENCOUNTER — OFFICE VISIT (OUTPATIENT)
Dept: MEDICAL GROUP | Facility: PHYSICIAN GROUP | Age: 54
End: 2024-09-23
Payer: COMMERCIAL

## 2024-09-23 VITALS
WEIGHT: 205.2 LBS | BODY MASS INDEX: 34.19 KG/M2 | OXYGEN SATURATION: 96 % | HEART RATE: 65 BPM | TEMPERATURE: 98 F | HEIGHT: 65 IN | SYSTOLIC BLOOD PRESSURE: 110 MMHG | DIASTOLIC BLOOD PRESSURE: 70 MMHG

## 2024-09-23 DIAGNOSIS — R73.03 PREDIABETES: ICD-10-CM

## 2024-09-23 DIAGNOSIS — Z12.31 ENCOUNTER FOR SCREENING MAMMOGRAM FOR BREAST CANCER: ICD-10-CM

## 2024-09-23 DIAGNOSIS — Z23 NEED FOR VACCINATION: ICD-10-CM

## 2024-09-23 DIAGNOSIS — E78.2 MIXED HYPERLIPIDEMIA: ICD-10-CM

## 2024-09-23 DIAGNOSIS — Z00.00 ENCOUNTER FOR WELL ADULT EXAM WITHOUT ABNORMAL FINDINGS: ICD-10-CM

## 2024-09-23 DIAGNOSIS — E03.9 ACQUIRED HYPOTHYROIDISM: ICD-10-CM

## 2024-09-23 PROCEDURE — 99396 PREV VISIT EST AGE 40-64: CPT | Mod: 25 | Performed by: NURSE PRACTITIONER

## 2024-09-23 PROCEDURE — 90471 IMMUNIZATION ADMIN: CPT | Performed by: NURSE PRACTITIONER

## 2024-09-23 PROCEDURE — 3074F SYST BP LT 130 MM HG: CPT | Performed by: NURSE PRACTITIONER

## 2024-09-23 PROCEDURE — 90656 IIV3 VACC NO PRSV 0.5 ML IM: CPT | Performed by: NURSE PRACTITIONER

## 2024-09-23 PROCEDURE — 3078F DIAST BP <80 MM HG: CPT | Performed by: NURSE PRACTITIONER

## 2024-09-23 ASSESSMENT — FIBROSIS 4 INDEX: FIB4 SCORE: 1.05

## 2024-09-23 NOTE — PROGRESS NOTES
Subjective:   CC:   Chief Complaint   Patient presents with    Annual Exam    Lab Results     Verbal consent was acquired by the patient to use [employer] ambient listening note generation during this visit Yes      HPI:   Any Schwarz is a 54 y.o. female who presents for annual exam:    History of Present Illness  The patient presents for a routine checkup.    She has expressed interest in having annual mammograms due to previous abnormal results. She occasionally performs self-breast exams and once discovered a lump, which was subsequently treated. She reports no chest pain or shortness of breath.    She is currently not in a relationship and does not require STD screening as she is not sexually active.    She has been experiencing hot flashes, mood swings, and weight gain since her 30s. She underwent Lupron Depo injections for a year in her 30s to manage endometriosis, which effectively alleviated her symptoms.    She also reports occasional constipation and diarrhea, but no presence of blood in her urine or stool. She had some irritable bowel syndrome in her 30s and is starting to have that again. It usually occurs in the evenings. She has not tried increasing fiber intake, such as using Metamucil.    She has been taking thyroid medication but is uncertain about its effectiveness as she has not noticed any significant changes. She continues to experience a gravelly voice, obesity, and hair loss, which she attributes to her thyroid condition. She has not undergone an ultrasound of her thyroid.    She is unable to take her cholesterol medication concurrently with her thyroid medication, leading her to take her cholesterol medication at night. However, she admits to not being consistent with this regimen, which she believes may have contributed to her elevated cholesterol levels. She also reports difficulty swallowing her evening pills over the past year, often feeling as though they are getting stuck and  causing mild heartburn.    SOCIAL HISTORY  She is still not smoking. Her mother smoked when she was young. She used to have a beer every night. She wears seatbelt in the car. She wears sun protection outside all the time.    FAMILY HISTORY  Her mother is still alive and has high blood pressure. Her sister and brother are still alive and well. One of her maternal aunts is still alive. She has no uncles on mother's side. Juan and Romulo are alive. Danny is . She has a half-brother.    IMMUNIZATIONS  She is up to date on her tetanus vaccine. She did her shingles vaccine.     Anticipatory Guidance:  Cholesterol screenin2024   LDL controlled: 132  Diabetes screenin2024   A1c controlled: 6.0%, up from 5.2%  Diet: Recommend more lean meats, fruits, vegetables, whole grains.  Exercise: Encourage regular exercise.   Substance abuse: No   Safe in relationship: NA  Seatbelts, bike/motorcycle helmet: Yes  Sun protection: Yes   Dentist: Due for follow up  Eye doctor: Up to date     Cancer Screening:  Colorectal cancer screenin2021, 10-year recall colonoscopy  Cervical cancer screenin2023  Breast cancer screenin2023    Infectious Disease Screening/Immunizations:  STI screening: Declines  Chlamydia/Gonorrhea screening: Declines  Hep C screening: Completed  HIV screen: Completed  Practices safe sex: NA    Immunizations:   Influenza: Given today   Tetanus: 2019   Hep B: Discontinued   Pneumonia: N/A, due at age 65  RSV: N/A, due at age 60  Shingrix: Completed   Received HPV series: Aged out    Preventative Care Screening:   Osteoporosis Screening: NA, due at age 65  Tobacco Screening: Never smoker   AAA Screening: NA    Patient's last menstrual period was 2014.  She has utilized hormone replacement therapy.  Reports hot flashes and mood changes  No significant bloating/fluid retention, pelvic pain, or dyspareunia. No abnormal vaginal discharge.   No breast tenderness, mass,  nipple discharge or changes in size or contour.    OB History    Para Term  AB Living   0 0 0 0 0 0   SAB IAB Ectopic Molar Multiple Live Births   0 0 0 0 0 0     She  reports that she is not currently sexually active and has had partner(s) who are male. She reports using the following method of birth control/protection: Post-Menopausal.  She  has a past medical history of Anesthesia, Anxiety, Arthritis, Cancer ([facility]) (), Depression, Endometriosis (), High cholesterol, Kidney stone, Other specified disorder of intestines, Other specified symptom associated with female genital organs, Ovarian cyst, Ovarian cyst (), PONV (postoperative nausea and vomiting), and Unspecified urinary incontinence.  She  has a past surgical history that includes hysteroscopy with video operative (2013); abdominal hysterectomy total (2014); pr cystoscopy,insert ureteral stent (Left, 2021); pr cysto/uretero/pyeloscopy, dx (Left, 2021); and lasertripsy (Left, 2021).    Family History   Problem Relation Age of Onset    Hypertension Mother     Cancer Father         prostate    Prostate cancer Father     No Known Problems Sister     No Known Problems Brother     No Known Problems Brother     Alzheimer's Disease Maternal Aunt     Diabetes Maternal Aunt     Stroke Maternal Aunt     Dementia Maternal Aunt     Diabetes Maternal Aunt     Diabetes Maternal Aunt     Dementia Maternal Aunt     Diabetes Paternal Aunt     Dementia Paternal Aunt     Diabetes Paternal Aunt     Dementia Paternal Aunt     Diabetes Paternal Uncle     Dementia Paternal Uncle     Cancer Paternal Uncle     Diabetes Maternal Grandmother     Dementia Maternal Grandmother     Heart Attack Maternal Grandfather     Cancer Maternal Grandfather         not sure what type    Alzheimer's Disease Paternal Grandmother     Dementia Paternal Grandmother     Diabetes Paternal Grandfather     Stroke Paternal Grandfather      Social History      Tobacco Use    Smoking status: Never     Passive exposure: Past    Smokeless tobacco: Never   Vaping Use    Vaping status: Never Used   Substance Use Topics    Alcohol use: Yes     Alcohol/week: 3.6 oz     Types: 6 Cans of beer per week    Drug use: No     Comment: No, but uses CBD oil on shoulder.      Patient Active Problem List    Diagnosis Date Noted    Acquired hypothyroidism 12/19/2023    Umbilical hernia 09/19/2023    Mass of lower outer quadrant of right breast 09/19/2023    Class 1 obesity due to excess calories with serious comorbidity and body mass index (BMI) of 32.0 to 32.9 in adult 05/10/2022    Statin intolerance 03/30/2021    Thinning hair 02/25/2021    Gastroesophageal reflux disease without esophagitis 02/25/2021    Stress incontinence 02/25/2021    Cold sore 02/25/2021    Prediabetes 08/25/2020    Memory loss 02/20/2020    Chronic pain of both shoulders 04/10/2019    Left arm pain 06/14/2017    Arthralgia 06/10/2016    Vitamin D deficiency 11/01/2015    Mixed hyperlipidemia 10/23/2015    Anxiety and depression 10/15/2015     Current Outpatient Medications   Medication Sig Dispense Refill    colesevelam (WELCHOL) 625 MG Tab TAKE 3 TABLETS BY MOUTH TWICE A DAY WITH A MEAL 540 Tablet 1    citalopram (CELEXA) 20 MG Tab TAKE 1 TABLET BY MOUTH DAILY 90 Tablet 1    valacyclovir (VALTREX) 1 GM Tab TAKE TWO TABLETS BY MOUTH EVERY 12 HOURS FOR ONE DAY AS NEEDED FO COLD SORE FLARE 12 Tablet 0    Multiple Vitamin (MULTIVITAMIN PO) Take  by mouth.      VITAMIN D PO Take 5,000 Units by mouth every day.      Apoaequorin (PREVAGEN PO) Take 1 Tablet by mouth every day.       No current facility-administered medications for this visit.     Allergies   Allergen Reactions    Minocycline Hives     Lip swelling    Statins [Hmg-Coa-R Inhibitors]      Muscle aches with a few different types - years ago.      Review of Systems   Constitutional: Negative for fever, chills and malaise/fatigue.   HENT: + Raspy voice and  "difficulty swallowing pills.  Negative for congestion.    Eyes: Negative for pain.   Respiratory: Negative for cough and shortness of breath.    Cardiovascular: Negative for chest pain and leg swelling.   Gastrointestinal: Negative for nausea, vomiting, abdominal pain and diarrhea.   Genitourinary: Negative for dysuria and hematuria.   Skin: + Hair loss.  Negative for rash.   Neurological: Negative for dizziness, focal weakness and headaches.   Endo/Heme/Allergies: Does not bruise/bleed easily.   Psychiatric/Behavioral: Negative for depression.  The patient is not nervous/anxious.      Objective:   /70 (BP Location: Right arm, Patient Position: Sitting, BP Cuff Size: Adult)   Pulse 65   Temp 36.7 °C (98 °F) (Temporal)   Ht 1.651 m (5' 5\")   Wt 93.1 kg (205 lb 3.2 oz)   LMP 05/01/2014   SpO2 96%   BMI 34.15 kg/m²     Wt Readings from Last 4 Encounters:   09/23/24 93.1 kg (205 lb 3.2 oz)   02/20/24 90.7 kg (200 lb)   12/19/23 92.5 kg (204 lb)   09/19/23 91.2 kg (201 lb)     Physical Exam:  Constitutional: Well-developed and well-nourished. Not diaphoretic. No distress.   Skin: Skin is warm and dry. No rash noted.  Head: Atraumatic without lesions.  Eyes: Conjunctivae and extraocular motions are normal. Pupils are equal, round, and reactive to light. No scleral icterus.   Ears:  External ears unremarkable. Tympanic membranes clear and intact.  Nose: Nares patent. Septum midline. Turbinates without erythema nor edema. No discharge.   Mouth/Throat: Tongue normal. Oropharynx is clear and moist. Posterior pharynx without erythema or exudates.  Neck: Supple, trachea midline. Normal range of motion. No thyromegaly present. No lymphadenopathy--cervical or supraclavicular.  Cardiovascular: Regular rate and rhythm, S1 and S2 without murmur, rubs, or gallops.    Respiratory: Effort normal. Clear to auscultation throughout. No adventitious sounds.   Breast:  Breast exam deferred. Discussed monthly self exams and what " to look for, including peau d'orange or nipple retraction, discharge, breasts moving freely and equally without restriction, axillary/supraclavicular adenopathy, or palpable masses/nodules.  Abdomen: Soft, non tender, and without distention. Active bowel sounds in all four quadrants. No rebound, guarding.  Extremities: No cyanosis, clubbing, erythema, nor edema. Radial pulses intact and symmetric.   Musculoskeletal: All major joints AROM full in all directions without pain.  Neurological: Alert and oriented x 3. Grossly non-focal. Strength and sensation grossly intact.   Psychiatric:  Behavior, mood, and affect are appropriate.    Assessment and Plan:   1. Encounter for well adult exam without abnormal findings  Her next Pap smear is scheduled for April 2026, colonoscopy for September 2031, Tdap for August 2029, and mammogram for September 2024. She has completed her hepatitis C screening and HIV screening. Pneumonia vaccine will be due when she turns 63, RSV at 58, and bone density testing at 63 to check for osteopenia and osteoporosis. She will receive her flu shot today. Weightbearing exercises for 150 minutes a week, equivalent to 30 minutes for 5 days a week, are recommended for bone health. She reports experiencing symptoms of IBS in the evenings. She is advised to try increasing her fiber intake with Metamucil, her mother recently purchased Metamucil cookies.    2. Acquired hypothyroidism  Chronic, ongoing. Her thyroid levels are within normal ranges, but she reports no improvement in symptoms. She will discontinue her thyroid medication, levothyroxine 37.5 mcg daily. An ultrasound of her thyroid will be ordered to check for any nodules that may be causing her raspy voice. If no nodules are found, a referral to an ENT specialist may be considered. Labs will be repeated in 6 months to monitor her thyroid levels.  - T3 FREE; Future  - FREE THYROXINE; Future  - TSH; Future  - US-THYROID; Future    3. Mixed  hyperlipidemia  Chronic, worsening. Her total cholesterol has increased from 168 to 206, while her triglycerides remain normal at 103. Her HDL has slightly increased from 52 to 53, and her LDL has increased from 100 to 132. She will resume taking her cholesterol medication, WelChol, 625 mg, 3 tablets twice daily. Labs will be repeated in 6 months to monitor her cholesterol levels.  - Lipid Profile; Future    4. Prediabetes  Chronic, worsening. Her A1c has increased from 5.2% last year to 6.0%, placing her in the prediabetes range. Her A1c will be checked again in 6 months to monitor her blood sugar levels. She is advised to work on her diet and exercise to prevent progression to diabetes.  - HEMOGLOBIN A1C; Future    5. Encounter for screening mammogram for breast cancer  Due for screening.  - MA-SCREENING MAMMO BILAT W/TOMOSYNTHESIS W/CAD; Future    6. Need for vaccination  Given today.  - INFLUENZA VACCINE QUAD INJ (PF)      Health maintenance: Up to date   Labs per orders  Immunizations: Per orders   Patient counseled about skin care, diet, supplements, and exercise.  Discussed  breast self exam, mammography screening, menopause, osteoporosis, adequate intake of calcium and vitamin D, diet and exercise, colorectal cancer screening.     Follow-up: Return in about 6 months (around 3/23/2025) for Follow up Labs, Follow up Medications, Follow up Diagnostics.     I have placed the below orders and discussed them with an approved delegating provider. The MA is performing the below orders under the direction of Dr. Ramos.       Please note that this dictation was created using voice recognition software. I have worked with consultants from the vendor as well as technical experts from [employer] to optimize the interface. I have made every reasonable attempt to correct obvious errors, but I expect that there are errors of grammar and possibly content that I did not discover before finalizing the note.

## 2024-10-11 ENCOUNTER — HOSPITAL ENCOUNTER (OUTPATIENT)
Facility: MEDICAL CENTER | Age: 54
End: 2024-10-11
Attending: PHYSICIAN ASSISTANT
Payer: COMMERCIAL

## 2024-10-11 ENCOUNTER — OFFICE VISIT (OUTPATIENT)
Dept: URGENT CARE | Facility: PHYSICIAN GROUP | Age: 54
End: 2024-10-11
Payer: COMMERCIAL

## 2024-10-11 VITALS
HEART RATE: 80 BPM | DIASTOLIC BLOOD PRESSURE: 82 MMHG | BODY MASS INDEX: 31.71 KG/M2 | RESPIRATION RATE: 14 BRPM | OXYGEN SATURATION: 95 % | SYSTOLIC BLOOD PRESSURE: 132 MMHG | TEMPERATURE: 97.4 F | WEIGHT: 202 LBS | HEIGHT: 67 IN

## 2024-10-11 DIAGNOSIS — N30.01 ACUTE CYSTITIS WITH HEMATURIA: ICD-10-CM

## 2024-10-11 LAB
APPEARANCE UR: CLEAR
BILIRUB UR STRIP-MCNC: NEGATIVE MG/DL
COLOR UR AUTO: YELLOW
GLUCOSE UR STRIP.AUTO-MCNC: NEGATIVE MG/DL
KETONES UR STRIP.AUTO-MCNC: NEGATIVE MG/DL
LEUKOCYTE ESTERASE UR QL STRIP.AUTO: NORMAL
NITRITE UR QL STRIP.AUTO: NEGATIVE
PH UR STRIP.AUTO: 5.5 [PH] (ref 5–8)
PROT UR QL STRIP: NEGATIVE MG/DL
RBC UR QL AUTO: NORMAL
SP GR UR STRIP.AUTO: <=1.005
UROBILINOGEN UR STRIP-MCNC: 0.2 MG/DL

## 2024-10-11 PROCEDURE — 3075F SYST BP GE 130 - 139MM HG: CPT | Performed by: PHYSICIAN ASSISTANT

## 2024-10-11 PROCEDURE — 81002 URINALYSIS NONAUTO W/O SCOPE: CPT | Performed by: PHYSICIAN ASSISTANT

## 2024-10-11 PROCEDURE — 87086 URINE CULTURE/COLONY COUNT: CPT

## 2024-10-11 PROCEDURE — 99213 OFFICE O/P EST LOW 20 MIN: CPT | Performed by: PHYSICIAN ASSISTANT

## 2024-10-11 PROCEDURE — 3079F DIAST BP 80-89 MM HG: CPT | Performed by: PHYSICIAN ASSISTANT

## 2024-10-11 RX ORDER — NITROFURANTOIN 25; 75 MG/1; MG/1
100 CAPSULE ORAL 2 TIMES DAILY
Qty: 10 CAPSULE | Refills: 0 | Status: SHIPPED | OUTPATIENT
Start: 2024-10-11 | End: 2024-10-16

## 2024-10-11 ASSESSMENT — ENCOUNTER SYMPTOMS
HEADACHES: 0
COUGH: 0
VOMITING: 0
SHORTNESS OF BREATH: 0
CHILLS: 0
ABDOMINAL PAIN: 0
CONSTIPATION: 0
EYE PAIN: 0
NAUSEA: 0
SORE THROAT: 0
MYALGIAS: 0
FEVER: 0
DIARRHEA: 0

## 2024-10-11 ASSESSMENT — FIBROSIS 4 INDEX: FIB4 SCORE: 1.05

## 2024-10-13 LAB
BACTERIA UR CULT: NORMAL
SIGNIFICANT IND 70042: NORMAL
SITE SITE: NORMAL
SOURCE SOURCE: NORMAL

## 2024-12-05 DIAGNOSIS — F41.9 ANXIETY AND DEPRESSION: ICD-10-CM

## 2024-12-05 DIAGNOSIS — F32.A ANXIETY AND DEPRESSION: ICD-10-CM

## 2024-12-06 NOTE — TELEPHONE ENCOUNTER
Received request via: Pharmacy    Was the patient seen in the last year in this department? Yes    Does the patient have an active prescription (recently filled or refills available) for medication(s) requested? No    [facility] Name[facility]    Does the patient have half-way Plus and need 100-day supply? (This applies to ALL medications) Patient does not have SCP

## 2024-12-08 RX ORDER — CITALOPRAM HYDROBROMIDE 20 MG/1
20 TABLET ORAL DAILY
Qty: 90 TABLET | Refills: 0 | Status: SHIPPED | OUTPATIENT
Start: 2024-12-08

## 2024-12-09 NOTE — TELEPHONE ENCOUNTER
Requested Prescriptions     Pending Prescriptions Disp Refills    citalopram (CELEXA) 20 MG Tab [Pharmacy Med Name: CITALOPRAM HBR 20 MG TABLET] 90 Tablet 0     Sig: TAKE 1 TABLET BY MOUTH DAILY       MIKE Bryant.

## 2024-12-10 ENCOUNTER — APPOINTMENT (OUTPATIENT)
Dept: URBAN - METROPOLITAN AREA CLINIC 22 | Facility: CLINIC | Age: 54
Setting detail: DERMATOLOGY
End: 2024-12-10

## 2024-12-10 DIAGNOSIS — Z85.820 PERSONAL HISTORY OF MALIGNANT MELANOMA OF SKIN: ICD-10-CM

## 2024-12-10 DIAGNOSIS — L81.4 OTHER MELANIN HYPERPIGMENTATION: ICD-10-CM

## 2024-12-10 DIAGNOSIS — D22 MELANOCYTIC NEVI: ICD-10-CM

## 2024-12-10 DIAGNOSIS — Z71.89 OTHER SPECIFIED COUNSELING: ICD-10-CM

## 2024-12-10 DIAGNOSIS — L57.0 ACTINIC KERATOSIS: ICD-10-CM | Status: INADEQUATELY CONTROLLED

## 2024-12-10 DIAGNOSIS — L82.1 OTHER SEBORRHEIC KERATOSIS: ICD-10-CM

## 2024-12-10 DIAGNOSIS — D18.0 HEMANGIOMA: ICD-10-CM

## 2024-12-10 DIAGNOSIS — B00.1 HERPESVIRAL VESICULAR DERMATITIS: ICD-10-CM

## 2024-12-10 DIAGNOSIS — L64.8 OTHER ANDROGENIC ALOPECIA: ICD-10-CM

## 2024-12-10 PROBLEM — D22.72 MELANOCYTIC NEVI OF LEFT LOWER LIMB, INCLUDING HIP: Status: ACTIVE | Noted: 2024-12-10

## 2024-12-10 PROBLEM — D23.62 OTHER BENIGN NEOPLASM OF SKIN OF LEFT UPPER LIMB, INCLUDING SHOULDER: Status: ACTIVE | Noted: 2024-12-10

## 2024-12-10 PROBLEM — D22.5 MELANOCYTIC NEVI OF TRUNK: Status: ACTIVE | Noted: 2024-12-10

## 2024-12-10 PROBLEM — D18.01 HEMANGIOMA OF SKIN AND SUBCUTANEOUS TISSUE: Status: ACTIVE | Noted: 2024-12-10

## 2024-12-10 PROCEDURE — ? SUNSCREEN RECOMMENDATIONS

## 2024-12-10 PROCEDURE — ? COUNSELING

## 2024-12-10 PROCEDURE — ? ADDITIONAL NOTES

## 2024-12-10 PROCEDURE — ? PRESCRIPTION MEDICATION MANAGEMENT

## 2024-12-10 PROCEDURE — 99214 OFFICE O/P EST MOD 30 MIN: CPT | Mod: 25

## 2024-12-10 PROCEDURE — ? PRESCRIPTION

## 2024-12-10 PROCEDURE — 17003 DESTRUCT PREMALG LES 2-14: CPT

## 2024-12-10 PROCEDURE — ? LIQUID NITROGEN

## 2024-12-10 PROCEDURE — 17000 DESTRUCT PREMALG LESION: CPT

## 2024-12-10 RX ORDER — VALACYCLOVIR 1 G/1
TABLET, FILM COATED ORAL
Qty: 28 | Refills: 4 | Status: ERX | COMMUNITY
Start: 2024-12-10

## 2024-12-10 RX ORDER — MINOXIDIL/FINASTERIDE 7 %-0.1 %
1 SOLUTION, NON-ORAL TOPICAL QD
Qty: 60 | Refills: 4 | Status: ERX

## 2024-12-10 RX ADMIN — VALACYCLOVIR: 1 TABLET, FILM COATED ORAL at 00:00

## 2024-12-10 ASSESSMENT — LOCATION SIMPLE DESCRIPTION DERM
LOCATION SIMPLE: ABDOMEN
LOCATION SIMPLE: RIGHT UPPER BACK
LOCATION SIMPLE: RIGHT TEMPLE
LOCATION SIMPLE: LEFT KNEE
LOCATION SIMPLE: LEFT PRETIBIAL REGION
LOCATION SIMPLE: ANTERIOR SCALP
LOCATION SIMPLE: LEFT FOREARM
LOCATION SIMPLE: LEFT UPPER BACK
LOCATION SIMPLE: LEFT FOREHEAD

## 2024-12-10 ASSESSMENT — LOCATION DETAILED DESCRIPTION DERM
LOCATION DETAILED: LEFT PROXIMAL PRETIBIAL REGION
LOCATION DETAILED: LEFT PROXIMAL DORSAL FOREARM
LOCATION DETAILED: LEFT LATERAL ABDOMEN
LOCATION DETAILED: LEFT KNEE
LOCATION DETAILED: LEFT SUPERIOR MEDIAL FOREHEAD
LOCATION DETAILED: MID-FRONTAL SCALP
LOCATION DETAILED: LEFT INFERIOR UPPER BACK
LOCATION DETAILED: RIGHT MID-UPPER BACK
LOCATION DETAILED: RIGHT LATERAL TEMPLE

## 2024-12-10 ASSESSMENT — LOCATION ZONE DERM
LOCATION ZONE: SCALP
LOCATION ZONE: LEG
LOCATION ZONE: FACE
LOCATION ZONE: TRUNK
LOCATION ZONE: ARM

## 2024-12-10 NOTE — PROCEDURE: LIQUID NITROGEN
Post-Care Instructions: I reviewed with the patient in detail post-care instructions. Patient is to wear sunprotection, and avoid picking at any of the treated lesions. Pt may apply Vaseline to crusted or scabbing areas.
Render Note In Bullet Format When Appropriate: No
Duration Of Freeze Thaw-Cycle (Seconds): 3
Consent: The patient's consent was obtained including but not limited to risks of crusting, scabbing, blistering, scarring, darker or lighter pigmentary change, recurrence, incomplete removal and infection.
Show Aperture Variable?: Yes
Detail Level: Detailed
Number Of Freeze-Thaw Cycles: 2 freeze-thaw cycles

## 2024-12-10 NOTE — PROCEDURE: COUNSELING
Quality 137: Melanoma: Continuity Of Care - Recall System: Patient information entered into a recall system that includes: target date for the next exam specified AND a process to follow up with patients regarding missed or unscheduled appointments
Quality 224: Stage 0-Iic Melanoma: Overutilization Of Imaging Studies For Only Stage 0-Iic Melanoma: None of the following diagnostic imaging studies ordered: chest X-ray, CT, Ultrasound, MRI, PET, or nuclear medicine scans (ML)
Detail Level: Detailed
When Should The Patient Follow-Up For Their Next Full-Body Skin Exam?: 1 Year
Detail Level: Zone
Detail Level: Generalized

## 2024-12-10 NOTE — PROCEDURE: ADDITIONAL NOTES
Additional Notes: Pt requested refill today
Render Risk Assessment In Note?: no
Detail Level: Simple
Additional Notes: Pt gave verbal consent to proceed with LN2.
Additional Notes: Flared today

## 2025-01-06 ENCOUNTER — HOSPITAL ENCOUNTER (OUTPATIENT)
Dept: RADIOLOGY | Facility: MEDICAL CENTER | Age: 55
End: 2025-01-06
Attending: NURSE PRACTITIONER
Payer: COMMERCIAL

## 2025-01-06 DIAGNOSIS — Z12.31 ENCOUNTER FOR SCREENING MAMMOGRAM FOR BREAST CANCER: ICD-10-CM

## 2025-01-06 PROCEDURE — 77067 SCR MAMMO BI INCL CAD: CPT

## 2025-02-03 ENCOUNTER — HOSPITAL ENCOUNTER (OUTPATIENT)
Dept: RADIOLOGY | Facility: MEDICAL CENTER | Age: 55
End: 2025-02-03
Attending: NURSE PRACTITIONER
Payer: COMMERCIAL

## 2025-02-03 DIAGNOSIS — E03.9 ACQUIRED HYPOTHYROIDISM: ICD-10-CM

## 2025-02-03 PROCEDURE — 76536 US EXAM OF HEAD AND NECK: CPT

## 2025-03-21 ENCOUNTER — HOSPITAL ENCOUNTER (OUTPATIENT)
Dept: LAB | Facility: MEDICAL CENTER | Age: 55
End: 2025-03-21
Attending: NURSE PRACTITIONER
Payer: COMMERCIAL

## 2025-03-21 DIAGNOSIS — E03.9 ACQUIRED HYPOTHYROIDISM: ICD-10-CM

## 2025-03-21 DIAGNOSIS — R73.03 PREDIABETES: ICD-10-CM

## 2025-03-21 DIAGNOSIS — E78.2 MIXED HYPERLIPIDEMIA: ICD-10-CM

## 2025-03-21 LAB
EST. AVERAGE GLUCOSE BLD GHB EST-MCNC: 117 MG/DL
HBA1C MFR BLD: 5.7 % (ref 4–5.6)

## 2025-03-21 PROCEDURE — 80061 LIPID PANEL: CPT

## 2025-03-21 PROCEDURE — 83036 HEMOGLOBIN GLYCOSYLATED A1C: CPT

## 2025-03-21 PROCEDURE — 36415 COLL VENOUS BLD VENIPUNCTURE: CPT

## 2025-03-21 PROCEDURE — 84481 FREE ASSAY (FT-3): CPT

## 2025-03-21 PROCEDURE — 84439 ASSAY OF FREE THYROXINE: CPT

## 2025-03-21 PROCEDURE — 84443 ASSAY THYROID STIM HORMONE: CPT

## 2025-03-22 LAB
CHOLEST SERPL-MCNC: 181 MG/DL (ref 100–199)
FASTING STATUS PATIENT QL REPORTED: NORMAL
HDLC SERPL-MCNC: 56 MG/DL
LDLC SERPL CALC-MCNC: 108 MG/DL
T3FREE SERPL-MCNC: 3.02 PG/ML (ref 2–4.4)
T4 FREE SERPL-MCNC: 0.92 NG/DL (ref 0.93–1.7)
TRIGL SERPL-MCNC: 86 MG/DL (ref 0–149)
TSH SERPL-ACNC: 0.8 UIU/ML (ref 0.35–5.5)

## 2025-03-23 ENCOUNTER — RESULTS FOLLOW-UP (OUTPATIENT)
Dept: MEDICAL GROUP | Facility: PHYSICIAN GROUP | Age: 55
End: 2025-03-23

## 2025-03-25 ENCOUNTER — OFFICE VISIT (OUTPATIENT)
Dept: MEDICAL GROUP | Facility: PHYSICIAN GROUP | Age: 55
End: 2025-03-25
Payer: COMMERCIAL

## 2025-03-25 VITALS
TEMPERATURE: 96.7 F | SYSTOLIC BLOOD PRESSURE: 110 MMHG | DIASTOLIC BLOOD PRESSURE: 62 MMHG | OXYGEN SATURATION: 96 % | WEIGHT: 201.8 LBS | HEART RATE: 79 BPM | BODY MASS INDEX: 33.62 KG/M2 | HEIGHT: 65 IN

## 2025-03-25 DIAGNOSIS — E03.9 ACQUIRED HYPOTHYROIDISM: ICD-10-CM

## 2025-03-25 DIAGNOSIS — E66.811 CLASS 1 OBESITY DUE TO EXCESS CALORIES WITH SERIOUS COMORBIDITY AND BODY MASS INDEX (BMI) OF 33.0 TO 33.9 IN ADULT: ICD-10-CM

## 2025-03-25 DIAGNOSIS — E78.2 MIXED HYPERLIPIDEMIA: ICD-10-CM

## 2025-03-25 DIAGNOSIS — Z23 NEED FOR VACCINATION: ICD-10-CM

## 2025-03-25 DIAGNOSIS — E55.9 VITAMIN D DEFICIENCY: ICD-10-CM

## 2025-03-25 DIAGNOSIS — Z00.00 ROUTINE HEALTH MAINTENANCE: ICD-10-CM

## 2025-03-25 DIAGNOSIS — E04.1 NODULE OF LEFT LOBE OF THYROID GLAND: ICD-10-CM

## 2025-03-25 DIAGNOSIS — L65.9 THINNING HAIR: ICD-10-CM

## 2025-03-25 DIAGNOSIS — R73.03 PREDIABETES: ICD-10-CM

## 2025-03-25 DIAGNOSIS — E66.09 CLASS 1 OBESITY DUE TO EXCESS CALORIES WITH SERIOUS COMORBIDITY AND BODY MASS INDEX (BMI) OF 33.0 TO 33.9 IN ADULT: ICD-10-CM

## 2025-03-25 PROCEDURE — 90471 IMMUNIZATION ADMIN: CPT

## 2025-03-25 PROCEDURE — 3074F SYST BP LT 130 MM HG: CPT

## 2025-03-25 PROCEDURE — 99214 OFFICE O/P EST MOD 30 MIN: CPT | Mod: 25

## 2025-03-25 PROCEDURE — 90677 PCV20 VACCINE IM: CPT

## 2025-03-25 PROCEDURE — 3078F DIAST BP <80 MM HG: CPT

## 2025-03-25 RX ORDER — FINASTERIDE 0.1% / MINOXIDIL 7% .1; 7 G/100G; G/100G
1 SOLUTION TOPICAL DAILY
COMMUNITY

## 2025-03-25 ASSESSMENT — PATIENT HEALTH QUESTIONNAIRE - PHQ9: CLINICAL INTERPRETATION OF PHQ2 SCORE: 0

## 2025-03-25 ASSESSMENT — FIBROSIS 4 INDEX: FIB4 SCORE: 1.05

## 2025-03-25 ASSESSMENT — ENCOUNTER SYMPTOMS
ROS SKIN COMMENTS: HAIR LOSS
FATIGUE: 1

## 2025-03-25 NOTE — PROGRESS NOTES
"Verbal consent was acquired by the patient to use [employer] ambient listening note generation during this visit Yes      Subjective   Any Schwarz is a 54 y.o. female who presents for:  History of Present Illness  The patient presents for evaluation of cholesterol management, thyroid nodule, and health maintenance.    She has been managing her cholesterol levels with WelChol, initially administered once daily in the evening due to potential interactions with her thyroid medication. However, she has since discontinued her thyroid medication and increased the frequency of WelChol to twice daily. She reports persistent symptoms suggestive of thyroid dysfunction, including fatigue, weight gain, hair loss, and cognitive difficulties. She expresses concern about the potential for elevated cholesterol levels if she resumes her thyroid medication.    She is under the care of a dermatologist, Dr. Zenobia Thayer, for hair loss, a condition she attributes to menopause. She has not been diagnosed with any specific type of alopecia. She has been using a topical solution containing finasteride 0.1% and minoxidil 7% once daily, which she believes provides some benefit. Additionally, she has recently started taking an oral collagen supplement.    She had her influenza vaccine in September. Her last Pap smear was in April 2021. She had a gynecologist 5 years ago but has not seen him since.    She has been making efforts to improve her diet, although she acknowledges that this is an area where she struggles.    MEDICATIONS  Current: WelChol, finasteride/minoxidil, collagen powder    IMMUNIZATIONS  She received her influenza vaccine in September.    Review of Systems   Constitutional:  Positive for fatigue.        Weight gain   Skin:         Hair loss   Neurological:         \"Cognitive difficulties\"   All other systems reviewed and are negative.    Objective   /62 (BP Location: Left arm, Patient Position: Sitting, BP " "Cuff Size: Adult)   Pulse 79   Temp 35.9 °C (96.7 °F) (Temporal)   Ht 1.651 m (5' 5\")   Wt 91.5 kg (201 lb 12.8 oz)   SpO2 96%   Physical Exam  General: Well nourished, well developed female in NAD, awake and conversant.  Eyes: Normal conjunctiva, anicteric.  Round symmetrical pupils.  ENT: Hearing grossly intact.  No nasal discharge.  Neck: Neck is supple.  No masses or thyromegaly.  CV: No lower extremity edema.  Respiratory: Respirations are nonlabored.  No wheezing.  Abdomen: Non-Distended.  Skin: Warm.  No rashes or ulcers.  MSK: Normal ambulation.  No clubbing or cyanosis.  Neuro: Sensation and CN II-XII grossly normal.  Psych: Alert and oriented.  Cooperative, appropriate mood and affect, normal judgment.      Results  Laboratory Studies  A1c is 5.7. Total cholesterol is 181, triglycerides are 86, HDL is 56, LDL is 108. TSH is normal, free T4 is low at 0.92, T3 is normal.    Imaging  Thyroid ultrasound shows a 0.6 cm nodule on the left lobe of the thyroid.    Assessment & Plan  1. Mixed hyperlipidemia  Chronic, improving.  Her cholesterol levels have shown improvement with WelChol 625 mg tablets, 3 tablets taken twice daily. Total cholesterol decreased from 206 to 181, triglycerides from 103 to 86, HDL increased from 53 to 56, and LDL decreased from 132 to 108. She will continue her current regimen of WelChol. Due for updated annual labs prior to annual follow-up in September 2025.   - Comp Metabolic Panel; Future  - Lipid Profile; Future  - TSH; Future    2. Prediabetes  Chronic, improving without medication.  Her A1c has improved from 6.0 in September to 5.7. She is advised to continue her current management plan, including dietary modifications. A1c will be rechecked in September along with other labs.  - HEMOGLOBIN A1C; Future  - Comp Metabolic Panel; Future  - Lipid Profile; Future    3. Acquired hypothyroidism  Chronic, ongoing without medication.  Patient reports she did not have improvement of " symptoms with thyroid medication, plan to repeat labs prior to follow-up in September 2025.  - FREE THYROXINE; Future  - T3 FREE; Future  - TSH; Future    4. Nodule of left lobe of thyroid gland  New to examiner and patient.  A thyroid ultrasound in February revealed a 0.6 cm nodule on the left lobe of the thyroid. Repeat imaging will be done at 1, 2, 3, and 5 years to monitor for any changes. If the nodule grows larger than 1 cm, a biopsy will be considered.    5. Vitamin D deficiency  Chronic, ongoing.  Continue over-the-counter vitamin D supplementation daily. Due for updated annual labs prior to annual follow-up in September 2025.   - VITAMIN D,25 HYDROXY (DEFICIENCY); Future    6. Thinning hair  Chronic, ongoing.  Continue to follow with dermatology.  Continue finasteride-minoxidil 0.1-7% solution daily. Due for updated annual labs prior to annual follow-up in September 2025.   - CBC WITH DIFFERENTIAL; Future  - VITAMIN D,25 HYDROXY (DEFICIENCY); Future  - TSH; Future    7. Class 1 obesity due to excess calories with serious comorbidity and body mass index (BMI) of 33.0 to 33.9 in adult  Chronic, ongoing.  Encourage diet high in fruits, vegetables, and fiber. And a diet low in salt, refined carbohydrates, cholesterol, saturated fat, and trans fatty acids.    Encourage a minimum of 30 minutes of moderate intensity aerobic exercise (eg, brisk walking) is recommended on five days each week. Or 30 minutes of vigorous-intensity aerobic exercise (eg, jogging) on three days each week.   Patient's body mass index is 33.58 kg/m². Exercise and nutrition counseling were performed at this visit.  Due for updated annual labs prior to annual follow-up in September 2025.   - Patient identified as having weight management issue.  Appropriate orders and counseling given.    8. Routine health maintenance  Due for updated annual labs prior to annual follow-up in September 2025. She received her influenza vaccine in September. A  pneumonia vaccine will be administered today. A Pap smear is scheduled for September 2025.  - HEMOGLOBIN A1C; Future  - CBC WITH DIFFERENTIAL; Future  - Comp Metabolic Panel; Future  - Lipid Profile; Future  - VITAMIN D,25 HYDROXY (DEFICIENCY); Future  - FREE THYROXINE; Future  - T3 FREE; Future  - TSH; Future    9. Need for vaccination  Given today.  - Pneumococcal Conjugate Vaccine 20-Valent (6 wks+)     Return in about 6 months (around 9/25/2025) for WW, PAP, Follow up Labs.      I have placed the below orders and discussed them with an approved delegating provider. The MA is performing the below orders under the direction of Dr. Ramos.      Please note that this dictation was created using voice recognition software. I have made every reasonable attempt to correct obvious errors, but I expect that there are errors of grammar and possibly content that I did not discover before finalizing the note.

## 2025-04-05 DIAGNOSIS — F41.9 ANXIETY AND DEPRESSION: ICD-10-CM

## 2025-04-05 DIAGNOSIS — F32.A ANXIETY AND DEPRESSION: ICD-10-CM

## 2025-04-08 RX ORDER — CITALOPRAM HYDROBROMIDE 20 MG/1
20 TABLET ORAL DAILY
Qty: 90 TABLET | Refills: 3 | Status: SHIPPED | OUTPATIENT
Start: 2025-04-08

## 2025-04-08 NOTE — TELEPHONE ENCOUNTER
Requested Prescriptions     Pending Prescriptions Disp Refills    citalopram (CELEXA) 20 MG Tab [Pharmacy Med Name: CITALOPRAM HBR 20 MG TABLET] 90 Tablet 3     Sig: TAKE 1 TABLET BY MOUTH DAILY       MIKE Bryant.

## 2025-04-08 NOTE — TELEPHONE ENCOUNTER
Received request via: Pharmacy    Was the patient seen in the last year in this department? Yes    Does the patient have an active prescription (recently filled or refills available) for medication(s) requested? No    [facility]     Does the patient have shelter Plus and need 100-day supply? (This applies to ALL medications) Patient does not have SCP

## 2025-10-01 ENCOUNTER — APPOINTMENT (OUTPATIENT)
Dept: MEDICAL GROUP | Facility: PHYSICIAN GROUP | Age: 55
End: 2025-10-01
Payer: COMMERCIAL

## (undated) DEVICE — SET LEADWIRE 5 LEAD BEDSIDE DISPOSABLE ECG (1SET OF 5/EA)

## (undated) DEVICE — HUMID-VENT HEAT AND MOISTURE EXCHANGE- (50/BX)

## (undated) DEVICE — CANISTER SUCTION RIGID RED 1500CC (40EA/CA)

## (undated) DEVICE — SUTURE GENERAL

## (undated) DEVICE — WATER IRRIG. STER 3000 ML - (4/CA)

## (undated) DEVICE — ADAPTOR UROLOK - 10/BX

## (undated) DEVICE — SPONGE GAUZESTER 4 X 4 4PLY - (128PK/CA)

## (undated) DEVICE — GLOVE, LITE (PAIR)

## (undated) DEVICE — GOWN SURGICAL X-LARGE ULTRA - FILM-REINFORCED (20/CA)

## (undated) DEVICE — GOWN SURGEONS X-LARGE - DISP. (30/CA)

## (undated) DEVICE — LASER TRAC TIP 200 MIRCON FOR 100 WATT LASER

## (undated) DEVICE — PACK CYSTOSCOPY III - (8/CA)

## (undated) DEVICE — SODIUM CHL. IRRIGATION 0.9% 3000ML (4EA/CA 65CA/PF)

## (undated) DEVICE — SHEATH NAVIGATOR 11/13 X 36 URETERAL ACCESS (5EA/BX)

## (undated) DEVICE — ELECTRODE DUAL RETURN W/ CORD - (50/PK)

## (undated) DEVICE — Device

## (undated) DEVICE — KIT ANESTHESIA W/CIRCUIT & 3/LT BAG W/FILTER (20EA/CA)

## (undated) DEVICE — BAG URODRAIN WITH TUBING - (20/CA)

## (undated) DEVICE — SENSOR SPO2 NEO LNCS ADHESIVE (20/BX) SEE USER NOTES

## (undated) DEVICE — SODIUM CHL IRRIGATION 0.9% 1000ML (12EA/CA)

## (undated) DEVICE — SET IRRIGATION CYSTOSCOPY Y-TYPE L81 IN (20EA/CA)

## (undated) DEVICE — SCOPE DIGITAL URETEROSCOPE DISPOSABLE

## (undated) DEVICE — JELLY SURGILUBE STERILE FOIL 5 GM (150EA/BX)

## (undated) DEVICE — NEPTUNE 4 PORT MANIFOLD - (20/PK)

## (undated) DEVICE — SLEEVE VASO CALF MED - (10PR/CA)

## (undated) DEVICE — ZIPWIRE STIFF .035 STRAIGHT TIP (5EA/BX)

## (undated) DEVICE — TOWEL STOP TIMEOUT SAFETY FLAG (40EA/CA)

## (undated) DEVICE — LACTATED RINGERS INJ 1000 ML - (14EA/CA 60CA/PF)

## (undated) DEVICE — MASK ANESTHESIA ADULT  - (100/CA)

## (undated) DEVICE — SET IRRIGATION CYSTOSCOPY TUBE L80 IN (20EA/CA)

## (undated) DEVICE — SUCTION INSTRUMENT YANKAUER BULBOUS TIP W/O VENT (50EA/CA)

## (undated) DEVICE — GOWN WARMING STANDARD FLEX - (30/CA)

## (undated) DEVICE — PROTECTOR ULNA NERVE - (36PR/CA)

## (undated) DEVICE — WATER IRRIGATION STERILE 1000ML (12EA/CA)

## (undated) DEVICE — BASKET ZERO 1.9FR X 120CM

## (undated) DEVICE — TUBE CONNECTING SUCTION - CLEAR PLASTIC STERILE 72 IN (50EA/CA)

## (undated) DEVICE — HEAD HOLDER JUNIOR/ADULT

## (undated) DEVICE — ELECTRODE 850 FOAM ADHESIVE - HYDROGEL RADIOTRNSPRNT (50/PK)

## (undated) DEVICE — CATHETER URET DUAL LUMEN

## (undated) DEVICE — BAG SPONGE COUNT 10.25 X 32 - BLUE (250/CA)

## (undated) DEVICE — WIRE GUIDE SENSOR DUAL FLEX - 5/BX